# Patient Record
Sex: MALE | Race: WHITE | NOT HISPANIC OR LATINO | Employment: OTHER | URBAN - METROPOLITAN AREA
[De-identification: names, ages, dates, MRNs, and addresses within clinical notes are randomized per-mention and may not be internally consistent; named-entity substitution may affect disease eponyms.]

---

## 2017-01-19 ENCOUNTER — DOCUMENTATION ONLY (OUTPATIENT)
Dept: CARDIOLOGY | Facility: CLINIC | Age: 67
End: 2017-01-19

## 2017-01-19 ENCOUNTER — TELEPHONE (OUTPATIENT)
Dept: CARDIOLOGY | Facility: CLINIC | Age: 67
End: 2017-01-19

## 2017-01-19 NOTE — PROGRESS NOTES
Spoke with Teodoro in cath lab and no orders needed for Floroscopy entered under heart cath. JENS Barros RN

## 2017-01-20 ENCOUNTER — HOSPITAL ENCOUNTER (OUTPATIENT)
Dept: CARDIOLOGY | Facility: CLINIC | Age: 67
End: 2017-01-20
Attending: INTERNAL MEDICINE
Payer: COMMERCIAL

## 2017-01-20 ENCOUNTER — HOSPITAL ENCOUNTER (OUTPATIENT)
Dept: CT IMAGING | Facility: CLINIC | Age: 67
Discharge: HOME | End: 2017-01-20
Attending: INTERNAL MEDICINE | Admitting: INTERNAL MEDICINE
Payer: MEDICARE

## 2017-01-20 DIAGNOSIS — I35.9 AORTIC VALVE DISORDER: ICD-10-CM

## 2017-01-20 PROCEDURE — 76000 FLUOROSCOPY <1 HR PHYS/QHP: CPT | Mod: XU

## 2017-01-20 PROCEDURE — 76000 FLUOROSCOPY <1 HR PHYS/QHP: CPT | Mod: 26 | Performed by: INTERNAL MEDICINE

## 2017-01-20 PROCEDURE — 25500064 ZZH RX 255 OP 636: Performed by: INTERNAL MEDICINE

## 2017-01-20 PROCEDURE — 25000125 ZZHC RX 250: Performed by: INTERNAL MEDICINE

## 2017-01-20 PROCEDURE — 71275 CT ANGIOGRAPHY CHEST: CPT

## 2017-01-20 RX ORDER — IOPAMIDOL 755 MG/ML
80 INJECTION, SOLUTION INTRAVASCULAR ONCE
Status: COMPLETED | OUTPATIENT
Start: 2017-01-20 | End: 2017-01-20

## 2017-01-20 RX ADMIN — IOPAMIDOL 80 ML: 755 INJECTION, SOLUTION INTRAVENOUS at 10:07

## 2017-01-20 RX ADMIN — SODIUM CHLORIDE 80 ML: 9 INJECTION, SOLUTION INTRAVENOUS at 10:08

## 2017-01-24 ENCOUNTER — OFFICE VISIT (OUTPATIENT)
Dept: CARDIOLOGY | Facility: CLINIC | Age: 67
End: 2017-01-24
Payer: COMMERCIAL

## 2017-01-24 VITALS
HEART RATE: 62 BPM | WEIGHT: 187.8 LBS | BODY MASS INDEX: 26.29 KG/M2 | HEIGHT: 71 IN | DIASTOLIC BLOOD PRESSURE: 76 MMHG | SYSTOLIC BLOOD PRESSURE: 156 MMHG

## 2017-01-24 DIAGNOSIS — I10 BENIGN ESSENTIAL HYPERTENSION: Primary | ICD-10-CM

## 2017-01-24 DIAGNOSIS — R03.0 ELEVATED BLOOD PRESSURE READING WITHOUT DIAGNOSIS OF HYPERTENSION: ICD-10-CM

## 2017-01-24 DIAGNOSIS — I71.20 THORACIC AORTIC ANEURYSM WITHOUT RUPTURE (H): ICD-10-CM

## 2017-01-24 DIAGNOSIS — I35.9 AORTIC VALVE DISORDER: ICD-10-CM

## 2017-01-24 PROCEDURE — 99214 OFFICE O/P EST MOD 30 MIN: CPT | Performed by: INTERNAL MEDICINE

## 2017-01-24 RX ORDER — BENAZEPRIL HYDROCHLORIDE 10 MG/1
10 TABLET ORAL DAILY
Qty: 90 TABLET | Refills: 3 | Status: SHIPPED | OUTPATIENT
Start: 2017-01-24 | End: 2018-08-20

## 2017-01-24 RX ORDER — AMLODIPINE BESYLATE 5 MG/1
5 TABLET ORAL DAILY
Qty: 90 TABLET | Refills: 3 | Status: SHIPPED | OUTPATIENT
Start: 2017-01-24 | End: 2018-03-21

## 2017-01-24 NOTE — PROGRESS NOTES
HPI and Plan:   See dictation    Orders Placed This Encounter   Procedures     Follow-Up with Cardiac Advanced Practice Provider     24 Hour Blood Pressure Monitor - Adult     Orders Placed This Encounter   Medications     benazepril (LOTENSIN) 10 MG tablet     Sig: Take 1 tablet (10 mg) by mouth daily     Dispense:  90 tablet     Refill:  3     amLODIPine (NORVASC) 5 MG tablet     Sig: Take 1 tablet (5 mg) by mouth daily Take 5mg each afternoon     Dispense:  90 tablet     Refill:  3     Medications Discontinued During This Encounter   Medication Reason     benazepril (LOTENSIN) 20 MG tablet Reorder     amLODIPine (NORVASC) 10 MG tablet Reorder         Encounter Diagnoses   Name Primary?     Aortic valve disorder      Benign essential hypertension Yes     Thoracic aortic aneurysm without rupture (H)      Elevated blood pressure reading without diagnosis of hypertension         CURRENT MEDICATIONS:  Current Outpatient Prescriptions   Medication Sig Dispense Refill     benazepril (LOTENSIN) 10 MG tablet Take 1 tablet (10 mg) by mouth daily 90 tablet 3     amLODIPine (NORVASC) 5 MG tablet Take 1 tablet (5 mg) by mouth daily Take 5mg each afternoon 90 tablet 3     atorvastatin (LIPITOR) 80 MG tablet Take 80 mg by mouth daily       tamsulosin (FLOMAX) 0.4 MG 24 hr capsule Take 1 capsule (0.4 mg) by mouth At Bedtime 60 capsule 0     co-enzyme Q-10 100 MG CAPS Take 1 capsule by mouth daily. 90 capsule prn     cholecalciferol (VITAMIN D) 1000 UNIT tablet Take 2 tablets by mouth daily. 100 tablet prn     Warfarin Sodium (COUMADIN PO) Take 7 mg by mouth every other day        OMEPRAZOLE PO Take 40 mg by mouth 2 times daily.         atenolol (TENORMIN) 100 MG tablet Take 100 mg by mouth daily.       [DISCONTINUED] benazepril (LOTENSIN) 20 MG tablet Take 0.5 tablets (10 mg) by mouth daily       [DISCONTINUED] amLODIPine (NORVASC) 10 MG tablet Take 0.5 tablets (5 mg) by mouth daily Take 5mg each afternoon 90 tablet 3        ALLERGIES     Allergies   Allergen Reactions     Morphine Sulfate        PAST MEDICAL HISTORY:  Past Medical History   Diagnosis Date     Prostate cancer (H)      XR seeds and external XRT     Hyperlipidemia      Embolic stroke (H)      L eye -when INR low, another CVA when given Vit K before prostate surgery     Hypertension      Gastrointestinal bleeding, upper      EGD - GASTRIC ULCER, EROSIVE ESOPHAGITIS, NON BLEEDING ESOPHAGEAL ULCERS, NON BLEEDING GASTRIC ULCER, LILLIE REYNA TEAR     Gastro-oesophageal reflux disease      Aortic valve disease      ascending aortic aneurysm , AVR 1995     Squamous cell carcinoma (H)      skin Moh's R face     Intervertebral disk disease      cervicle and lumbar     Seizure (H)      2012 unclear if actual surgery     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      Cystic medial necrosis (H)      fibrillin test negative for marfan's     First degree heart block      and RBBB     Laceration      L wrist with complete severing of radial artery     Sinusitis 2012     Non-compliance      missed office visit, tests     Colitis      radiation colitis, 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     PVC (premature ventricular contraction)      CAD (coronary artery disease)      minimal on CT cor angiogram       PAST SURGICAL HISTORY:  Past Surgical History   Procedure Laterality Date     Orthopedic surgery       LEFT ROTATOR CUFF REPAIR     Colonoscopy       Aortic valve replacement  1995     [facility] 27mm 27-[id number]-[id number] serial# [id number]: aortic valve replacement with composite graft with reimplanation of the coronary arteries into the graft     Insert radiation seeds prostate  3/15/2012     Procedure:INSERT RADIATION SEEDS PROSTATE; INSERT RADIATION SEEDS PROSTATE  - Paladium Seeds  110 seeds inserted; Surgeon:JONATHAN BAY; Location:[facility]     Mohs micrographic procedure       SCC face     Repair esophageal stricture       ?clip in esophagus       FAMILY HISTORY:  Family History   Problem Relation Age of Onset  "    Skin Cancer No family hx of      Heart Murmur Father 50     mitral valve disease-Rheumatic valve disease     Alzheimer Disease Mother 95     Bipolar Disorder Sister      Prostate Cancer Brother        SOCIAL HISTORY:  Social History     Social History     Marital Status:      Spouse Name: N/A     Number of Children: N/A     Years of Education: N/A     Occupational History     retired      prior medical device exec     Social History Main Topics     Smoking status: Never Smoker      Smokeless tobacco: Never Used     Alcohol Use: Yes      Comment: 1 beer nightly & a few on the weekends      Drug Use: No     Sexual Activity: Not on file     Other Topics Concern     Special Diet No     Exercise No     Social History Narrative       Review of Systems:  Skin:  Negative     Eyes:  Positive for    ENT:  Negative    Respiratory:  Negative    Cardiovascular:  Negative    Gastroenterology: Negative    Genitourinary:  Positive for    Musculoskeletal:  Negative    Neurologic:  Positive for stroke  Psychiatric:  Negative    Heme/Lymph/Imm:  Negative    Endocrine:  Negative      Physical Exam:  Vitals: /76 mmHg  Pulse 62  Ht 1.803 m (5' 11\")  Wt 85.186 kg (187 lb 12.8 oz)  BMI 26.20 kg/m2    Constitutional:           Skin:           Head:           Eyes:           ENT:           Neck:           Chest:             Cardiac:                    Abdomen:           Vascular:                                          Extremities and Back:                 Neurological:             Recent Lab Results:  LIPID RESULTS:  Lab Results   Component Value Date    CHOL 163 10/17/2016    HDL 50 10/17/2016    LDL 76 10/17/2016    TRIG 186* 10/17/2016    CHOLHDLRATIO 2.6 08/09/2012       LIVER ENZYME RESULTS:  Lab Results   Component Value Date    AST 30 01/07/2010    ALT <5* 10/17/2016       CBC RESULTS:  Lab Results   Component Value Date    WBC 5.6 08/08/2015    RBC 3.69* 08/08/2015    HGB 9.6* 08/08/2015    HCT 31.6* 08/08/2015 "    MCV 86 08/08/2015    MCH 26.0* 08/08/2015    MCHC 30.4* 08/08/2015    RDW 18.0* 08/08/2015     08/08/2015       BMP RESULTS:  Lab Results   Component Value Date     10/17/2016    POTASSIUM 4.2 10/17/2016    CHLORIDE 102 10/17/2016    CO2 31* 10/17/2016    ANIONGAP 8.2 10/17/2016    * 10/17/2016    BUN 25 10/17/2016    BUN 12.8 10/15/2013    CR 1.14 10/17/2016    GFRESTIMATED 64 10/17/2016    GFRESTBLACK 77 10/17/2016    SACHA 9.1 10/17/2016        A1C RESULTS:  No results found for: A1C    INR RESULTS:  Lab Results   Component Value Date    INR 2.31* 08/08/2015    INR 2.59* 06/23/2012           CC  [facility]  [address] [place] [address]

## 2017-01-24 NOTE — Clinical Note
2017      Regan Maurice MD    [facility]    [address]    [place]  [address]       RE: Francisco Williamson   MRN: [id number]   : 1950      Dear Kingsley:      I had the pleasure of following up on our mutual patient, Francisco Williamson.  As you recall, he had an aortic valve composite graft replacement 2 decades ago.  His gradient across the aortic valve looked like it was increased on the last echo.  I did valve fluoroscopy.  The aortic valve on fluoroscopy looks like it is opening and closing at the appropriate angle (St. Yordan metal valve).  The ascending aorta is normal, but we did a CAT scan, which indeed shows a descending thoracic aortic aneurysm up to 45 mm.  The patient's blood pressure at home is in the 120s; however, I am consistently getting between 150 and 180 here in the office.  No doubt it is possible that he is just nervous or this is white-coat hypertension, and he mentioned that he had coffee, but to be honest, I do not think we can ignore a question of high blood pressure in a patient who has an aortic aneurysm, and we need to settle for certain if his blood pressure is actually controlled throughout the day or not.  I am going to try to order a 24-hour ambulatory blood pressure machine, but with the new Medicare, I am not sure how it is going to be covered.  We are going to try to work out this issue and have him come back after some method of checking his blood pressure long-term to determine if he needs higher doses of antihypertensives.  I will keep you in the loop.  Again, this is this new Medicare problem which is frankly interrupting appropriate medical care for this kind gentleman.  We will need to repeat his echo next year to again look at the valve to make sure the gradient does not continue to go up.  Sometimes pannus can involve the aortic root without interrupting the valve leaflets, and it is possible that is why the valve looks  fine and yet the gradient seems to be going up.      Sincerely,            Jeff Pedro MD

## 2017-01-25 NOTE — PROGRESS NOTES
2017      Regan Maurice MD    [facility]    [address], [address]    [place]  [address]       RE: Francisco Williamson   MRN: [id number]   : 1950      Dear Kingsley:      I had the pleasure of following up on our mutual patient, Francisco Williamson.  As you recall, he had an aortic valve composite graft replacement 2 decades ago.  His gradient across the aortic valve looked like it was increased on the last echo.  I did valve fluoroscopy.  The aortic valve on fluoroscopy looks like it is opening and closing at the appropriate angle (St. Yordan metal valve).  The ascending aorta is normal, but we did a CAT scan, which indeed shows a descending thoracic aortic aneurysm up to 45 mm.  The patient's blood pressure at home is in the 120s; however, I am consistently getting between 150 and 180 here in the office.  No doubt it is possible that he is just nervous or this is white-coat hypertension, and he mentioned that he had coffee, but to be honest, I do not think we can ignore a question of high blood pressure in a patient who has an aortic aneurysm, and we need to settle for certain if his blood pressure is actually controlled throughout the day or not.  I am going to try to order a 24-hour ambulatory blood pressure machine, but with the new Medicare, I am not sure how it is going to be covered.  We are going to try to work out this issue and have him come back after some method of checking his blood pressure long-term to determine if he needs higher doses of antihypertensives.  I will keep you in the loop.  Again, this is this new Medicare problem which is frankly interrupting appropriate medical care for this kind gentleman.  We will need to repeat his echo next year to again look at the valve to make sure the gradient does not continue to go up.  Sometimes pannus can involve the aortic root without interrupting the valve leaflets, and it is possible that is why the valve looks  fine and yet the gradient seems to be going up.      Sincerely,            MD CRISSY Long MD             D: 2017 15:37   T: 2017 23:43   MT: LISA      Name:     DEN HARKINS   MRN:      [id number]        Account:      [id number]   :      1950           Service Date: 2017      Document: [id number]

## 2017-01-26 ENCOUNTER — HOSPITAL ENCOUNTER (OUTPATIENT)
Dept: CARDIOLOGY | Facility: CLINIC | Age: 67
Discharge: HOME | End: 2017-01-26
Attending: INTERNAL MEDICINE | Admitting: INTERNAL MEDICINE
Payer: MEDICARE

## 2017-01-26 DIAGNOSIS — I71.20 THORACIC AORTIC ANEURYSM WITHOUT RUPTURE (H): ICD-10-CM

## 2017-01-26 DIAGNOSIS — R03.0 ELEVATED BLOOD PRESSURE READING WITHOUT DIAGNOSIS OF HYPERTENSION: ICD-10-CM

## 2017-01-26 DIAGNOSIS — I10 BENIGN ESSENTIAL HYPERTENSION: ICD-10-CM

## 2017-01-26 PROCEDURE — 93788 AMBL BP MNTR W/SW A/R: CPT

## 2017-01-26 PROCEDURE — 93790 AMBL BP MNTR W/SW I&R: CPT | Performed by: INTERNAL MEDICINE

## 2017-02-01 ENCOUNTER — OFFICE VISIT (OUTPATIENT)
Dept: CARDIOLOGY | Facility: CLINIC | Age: 67
End: 2017-02-01
Attending: INTERNAL MEDICINE
Payer: COMMERCIAL

## 2017-02-01 VITALS
WEIGHT: 187.3 LBS | BODY MASS INDEX: 26.22 KG/M2 | HEART RATE: 54 BPM | DIASTOLIC BLOOD PRESSURE: 76 MMHG | SYSTOLIC BLOOD PRESSURE: 144 MMHG | HEIGHT: 71 IN

## 2017-02-01 DIAGNOSIS — I10 BENIGN ESSENTIAL HYPERTENSION: ICD-10-CM

## 2017-02-01 DIAGNOSIS — I71.20 THORACIC AORTIC ANEURYSM WITHOUT RUPTURE (H): ICD-10-CM

## 2017-02-01 PROCEDURE — 99214 OFFICE O/P EST MOD 30 MIN: CPT | Performed by: NURSE PRACTITIONER

## 2017-02-01 NOTE — PROGRESS NOTES
HISTORY OF PRESENT ILLNESS:  Francisco Williamson is a delightful gentleman Dr. Pedro and I have known for around 21 years.  He underwent a metal aortic valve replacement with composite graft 2 decades ago.  His aortic valve gradient was increased on his last echo and a fluoroscopy was done showing that his St Yordan metal valve leaflets are opening and closing appropriately.  His ascending aorta is normal.  However, a CT scan was ordered to check his descending aorta and it is aneurysmal to 45 mm.      His blood pressure in the office is always high, but at home he states it is normal.  Therefore, a 24-hour blood pressure monitor was done and shows good control.  He has actually been struggling to prostate cancer which was treated with seeds as well as external radiation.  He is now on Flomax and had to reduce his Lotrel back. In spite of this, his blood pressure is controlled as the blood pressure 24-hour monitor was done on the lower dose.  He has good control of his lipids on Lipitor.  Triglycerides are slightly high, but his fasting sugar was also slightly high.  He does follow with his primary care doctor, Dr. Maurice, for these issues.  He has no other complaints today.      IMPRESSION AND PLAN:   1.  Status post aortic valve replacement with composite graft.  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 Continue lifelong SBE prophylaxis.  We will do an echo next year to check the gradients through his valve for any potential development of pannus.   2.  Descending aorta measuring 45 mm.  I will check with Dr. Pedro to see when he once the next image with the patient.   3.  Hypertension, very well controlled confirmed by a 24-hour ambulatory blood pressure monitoring.  We will see him back at the end of the year, repeat an echocardiogram.  I will talk with Dr. Pedro about the next timing on imaging studies for the  aorta and get back to the patient.      It has been a great pleasure as always seeing Den in followup today.         RICHY EDEN NP             D: 2017 10:45   T: 2017 13:52   MT: DONNY      Name:     DEN HARKINS   MRN:      [id number]        Account:      [id number]   :      1950           Service Date: 2017      Document: [id number]

## 2017-02-01 NOTE — PROGRESS NOTES
HPI and Plan:   See dictation  [id number]    Orders Placed This Encounter   Procedures     Basic metabolic panel     Lipid Profile     Follow-Up with Cardiologist     Echocardiogram       No orders of the defined types were placed in this encounter.       There are no discontinued medications.      Encounter Diagnoses   Name Primary?     Benign essential hypertension      Thoracic aortic aneurysm without rupture (H)        CURRENT MEDICATIONS:  Current Outpatient Prescriptions   Medication Sig Dispense Refill     benazepril (LOTENSIN) 10 MG tablet Take 1 tablet (10 mg) by mouth daily 90 tablet 3     amLODIPine (NORVASC) 5 MG tablet Take 1 tablet (5 mg) by mouth daily Take 5mg each afternoon 90 tablet 3     atorvastatin (LIPITOR) 80 MG tablet Take 80 mg by mouth daily       tamsulosin (FLOMAX) 0.4 MG 24 hr capsule Take 1 capsule (0.4 mg) by mouth At Bedtime 60 capsule 0     co-enzyme Q-10 100 MG CAPS Take 1 capsule by mouth daily. 90 capsule prn     cholecalciferol (VITAMIN D) 1000 UNIT tablet Take 2 tablets by mouth daily. 100 tablet prn     Warfarin Sodium (COUMADIN PO) Take 7 mg by mouth every other day        OMEPRAZOLE PO Take 40 mg by mouth 2 times daily.         atenolol (TENORMIN) 100 MG tablet Take 100 mg by mouth daily.         ALLERGIES     Allergies   Allergen Reactions     Morphine Sulfate        PAST MEDICAL HISTORY:  Past Medical History   Diagnosis Date     Prostate cancer (H)      XR seeds and external XRT     Hyperlipidemia      Embolic stroke (H)      L eye -when INR low, another CVA when given Vit K before prostate surgery     Hypertension      Gastrointestinal bleeding, upper      EGD - GASTRIC ULCER, EROSIVE ESOPHAGITIS, NON BLEEDING ESOPHAGEAL ULCERS, NON BLEEDING GASTRIC ULCER, LILLIE REYNA TEAR     Gastro-oesophageal reflux disease      Aortic valve disease      ascending aortic aneurysm , AVR 1995     Squamous cell carcinoma (H)      skin Moh's R face     Intervertebral disk disease       cervicle and lumbar     Seizure (H)      2012 unclear if actual surgery     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      Cystic medial necrosis (H)      fibrillin test negative for marfan's     First degree heart block      and RBBB     Laceration      L wrist with complete severing of radial artery     Sinusitis 2012     Non-compliance      missed office visit, tests     Colitis      radiation colitis, 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     PVC (premature ventricular contraction)      CAD (coronary artery disease)      minimal on CT cor angiogram       PAST SURGICAL HISTORY:  Past Surgical History   Procedure Laterality Date     Orthopedic surgery       LEFT ROTATOR CUFF REPAIR     Colonoscopy       Aortic valve replacement  1995     [facility] 27mm 27-[id number]-[id number] serial# [id number]: aortic valve replacement with composite graft with reimplanation of the coronary arteries into the graft     Insert radiation seeds prostate  3/15/2012     Procedure:INSERT RADIATION SEEDS PROSTATE; INSERT RADIATION SEEDS PROSTATE  - Paladium Seeds  110 seeds inserted; Surgeon:JONATHAN BAY; Location:[facility]     Mohs micrographic procedure       SCC face     Repair esophageal stricture       ?clip in esophagus       FAMILY HISTORY:  Family History   Problem Relation Age of Onset     Skin Cancer No family hx of      Heart Murmur Father 50     mitral valve disease-Rheumatic valve disease     Alzheimer Disease Mother 95     Bipolar Disorder Sister      Prostate Cancer Brother        SOCIAL HISTORY:  Social History     Social History     Marital Status:      Spouse Name: N/A     Number of Children: N/A     Years of Education: N/A     Occupational History     retired      prior medical device exec     Social History Main Topics     Smoking status: Never Smoker      Smokeless tobacco: Never Used     Alcohol Use: Yes      Comment: 1 beer nightly & a few on the weekends      Drug Use: No     Sexual Activity: Not Asked     Other Topics Concern     Special Diet No  "    Exercise No     Social History Narrative       Review of Systems:  Skin:  Negative       Eyes:  Negative      ENT:  Negative      Respiratory:  Negative       Cardiovascular:  Negative      Gastroenterology: Negative      Genitourinary:  not assessed      Musculoskeletal:  Positive for arthritis    Neurologic:  Negative      Psychiatric:  Negative      Heme/Lymph/Imm:  Negative      Endocrine:  Negative        Physical Exam:  Vitals: /76 mmHg  Pulse 54  Ht 1.803 m (5' 11\")  Wt 84.959 kg (187 lb 4.8 oz)  BMI 26.13 kg/m2    Constitutional:           Skin:           Head:           Eyes:           ENT:           Neck:           Chest:             Cardiac:                    Abdomen:           Vascular:                                          Extremities and Back:                 Neurological:                 CC  Jeff Pedro MD   [facility]  [address]  [place] [address]                "

## 2017-02-01 NOTE — Clinical Note
2/1/2017    Regan Maurice MD  [facility]   [address]  [place] [address]    RE: Francisco Williamson       Dear Colleague,    I had the pleasure of seeing Francisco Williamson in the [facility].    Francisco Williamson is a delightful gentleman Dr. Pedro and I have known for around 21 years.  He underwent a metal aortic valve replacement with composite graft 2 decades ago.  His aortic valve gradient was increased on his last echo and a fluoroscopy was done showing that his St Yordan metal valve leaflets are opening and closing appropriately.  His ascending aorta is normal.  However, a CT scan was ordered to check his descending aorta and it is aneurysmal to 45 mm.      His blood pressure in the office is always high, but at home he states it is normal.  Therefore, a 24-hour blood pressure monitor was done and shows good control.  He has actually been struggling to prostate cancer which was treated with seeds as well as external radiation.  He is now on Flomax and had to reduce his Lotrel back. In spite of this, his blood pressure is controlled as the blood pressure 24-hour monitor was done on the lower dose.  He has good control of his lipids on Lipitor.  Triglycerides are slightly high, but his fasting sugar was also slightly high.  He does follow with his primary care doctor, Dr. Maurice, for these issues.  He has no other complaints today.     Outpatient Encounter Prescriptions as of 2/1/2017   Medication Sig Dispense Refill     benazepril (LOTENSIN) 10 MG tablet Take 1 tablet (10 mg) by mouth daily 90 tablet 3     amLODIPine (NORVASC) 5 MG tablet Take 1 tablet (5 mg) by mouth daily Take 5mg each afternoon 90 tablet 3     atorvastatin (LIPITOR) 80 MG tablet Take 80 mg by mouth daily       tamsulosin (FLOMAX) 0.4 MG 24 hr capsule Take 1 capsule (0.4 mg) by mouth At Bedtime 60 capsule 0     co-enzyme Q-10 100 MG CAPS Take 1 capsule by mouth daily. 90 capsule prn      cholecalciferol (VITAMIN D) 1000 UNIT tablet Take 2 tablets by mouth daily. 100 tablet prn     Warfarin Sodium (COUMADIN PO) Take 7 mg by mouth every other day        OMEPRAZOLE PO Take 40 mg by mouth 2 times daily.         atenolol (TENORMIN) 100 MG tablet Take 100 mg by mouth daily.       No facility-administered encounter medications on file as of 2/1/2017.      IMPRESSION AND PLAN:   1.  Status post aortic valve replacement with composite graft.  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 Continue lifelong SBE prophylaxis.  We will do an echo next year to check the gradients through his valve for any potential development of pannus.   2.  Descending aorta measuring 45 mm.  I will check with Dr. Pedro to see when he once the next image with the patient.   3.  Hypertension, very well controlled confirmed by a 24-hour ambulatory blood pressure monitoring.  We will see him back at the end of the year, repeat an echocardiogram.  I will talk with Dr. Pdero about the next timing on imaging studies for the aorta and get back to the patient.      It has been a great pleasure as always seeing Francisco in followup today.     Sincerely,    SOFYA Herrmann [facility]

## 2017-02-08 ENCOUNTER — DOCUMENTATION ONLY (OUTPATIENT)
Dept: CARDIOLOGY | Facility: CLINIC | Age: 67
End: 2017-02-08

## 2017-02-08 DIAGNOSIS — I71.20 THORACIC AORTIC ANEURYSM WITHOUT RUPTURE (H): Primary | ICD-10-CM

## 2017-02-08 NOTE — PROGRESS NOTES
Please call patient and let him know I talked to Mayela about his descending aorta being enlarged and when to do CT    He would like 2 years so order has been placed for 1/2019    thanks

## 2017-06-08 ENCOUNTER — TRANSFERRED RECORDS (OUTPATIENT)
Dept: CARDIOLOGY | Facility: CLINIC | Age: 67
End: 2017-06-08

## 2017-06-08 LAB
FERRITIN SERPL-MCNC: 49 NG/ML
IRON SATURATION: 9
IRON: 35 UG/DL
TIBC - QUEST: 404

## 2017-06-09 LAB
ALBUMIN SERPL-MCNC: 3.9 G/DL (ref 3.6–4.8)
ALP SERPL-CCNC: 57 U/L (ref 39–117)
ALT SERPL-CCNC: 13 U/L (ref 0–44)
ANION GAP SERPL CALCULATED.3IONS-SCNC: ABNORMAL MMOL/L
AST SERPL-CCNC: 17 U/L (ref 0–40)
BILIRUB SERPL-MCNC: 0.3 MG/DL (ref 0–1.2)
BUN SERPL-MCNC: 19 MG/DL (ref 8–27)
CALCIUM SERPL-MCNC: 8.6 MG/DL (ref 8.6–10.2)
CHLORIDE SERPLBLD-SCNC: 102 MMOL/L (ref 96–106)
CHOLEST SERPL-MCNC: 173 MG/DL (ref 100–199)
CO2 SERPL-SCNC: 24 MMOL/L (ref 18–28)
CREAT SERPL-MCNC: 1.06 MG/DL (ref 0.76–1.27)
GFR SERPL CREATININE-BSD FRML MDRD: 72 ML/MIN/1.73M2
GLUCOSE SERPL-MCNC: 64 MG/DL (ref 65–99)
HDLC SERPL-MCNC: 64 MG/DL
LDLC SERPL CALC-MCNC: 90 MG/DL (ref 0–99)
NONHDLC SERPL-MCNC: NORMAL MG/DL
POTASSIUM SERPL-SCNC: 4.7 MMOL/L (ref 3.5–5.2)
PROT SERPL-MCNC: 6.5 G/DL (ref 6–8.5)
SODIUM SERPL-SCNC: 142 MMOL/L (ref 134–144)
TRIGL SERPL-MCNC: 93 MG/DL (ref 0–149)

## 2017-07-28 DIAGNOSIS — I63.40 CEREBRAL EMBOLISM WITH CEREBRAL INFARCTION (H): Primary | ICD-10-CM

## 2017-11-09 ENCOUNTER — TELEPHONE (OUTPATIENT)
Dept: CARDIOLOGY | Facility: CLINIC | Age: 67
End: 2017-11-09

## 2017-11-09 NOTE — TELEPHONE ENCOUNTER
Pt states he did not think he needed to be seen for 2 yrs from DR Pedro's last visit. Per Pt DR Pedro had said 2 years but Mini had set up Pt to be seen with echo at first of year again. Informed Pt would discuss with Dr Pedro when he is back in office.  JENS Barros RN

## 2017-11-16 ENCOUNTER — TRANSFERRED RECORDS (OUTPATIENT)
Dept: HEALTH INFORMATION MANAGEMENT | Facility: CLINIC | Age: 67
End: 2017-11-16

## 2017-11-16 LAB
ALBUMIN SERPL-MCNC: 4 G/DL
ALP SERPL-CCNC: 67 U/L
ALT SERPL-CCNC: 16 U/L
ANION GAP SERPL CALCULATED.3IONS-SCNC: NORMAL MMOL/L
AST SERPL-CCNC: 22 U/L
BILIRUB SERPL-MCNC: 0.5 MG/DL
BUN SERPL-MCNC: 17 MG/DL
CALCIUM SERPL-MCNC: 8.8 MG/DL
CHLORIDE SERPLBLD-SCNC: 98 MMOL/L
CO2 SERPL-SCNC: 26 MMOL/L
CREAT SERPL-MCNC: 1.33 MG/DL
ERYTHROCYTE [DISTWIDTH] IN BLOOD BY AUTOMATED COUNT: 15.8 %
GFR SERPL CREATININE-BSD FRML MDRD: 54 ML/MIN/1.73M2
GLUCOSE SERPL-MCNC: 88 MG/DL (ref 70–99)
HCT VFR BLD AUTO: 42.6 %
HEMOGLOBIN: 13.6 G/DL
MCH RBC QN AUTO: 30.6 PG
MCHC RBC AUTO-ENTMCNC: 31.8 G/DL
MCV RBC AUTO: 96.3 FL
PLATELET # BLD AUTO: 154 10^9/L
POTASSIUM SERPL-SCNC: 5.1 MMOL/L
PROT SERPL-MCNC: 6.9 G/DL
RBC # BLD AUTO: 4.43 10^12/L
SODIUM SERPL-SCNC: 137 MMOL/L
WBC # BLD AUTO: 4.8 10^9/L

## 2017-11-20 NOTE — TELEPHONE ENCOUNTER
CAlled Pt and left message informing him per DR Pedro note that he did want to see Pt and have echo done to check increasing gradient. JENS Barros RN

## 2017-11-21 DIAGNOSIS — Z95.2 S/P AVR: Primary | ICD-10-CM

## 2018-03-19 DIAGNOSIS — E78.5 DYSLIPIDEMIA: Primary | ICD-10-CM

## 2018-03-21 ENCOUNTER — OFFICE VISIT (OUTPATIENT)
Dept: CARDIOLOGY | Facility: CLINIC | Age: 68
End: 2018-03-21
Payer: COMMERCIAL

## 2018-03-21 ENCOUNTER — HOSPITAL ENCOUNTER (OUTPATIENT)
Dept: CARDIOLOGY | Facility: CLINIC | Age: 68
Discharge: HOME | End: 2018-03-21
Admitting: FAMILY MEDICINE
Payer: MEDICARE

## 2018-03-21 VITALS
DIASTOLIC BLOOD PRESSURE: 72 MMHG | BODY MASS INDEX: 27.02 KG/M2 | HEIGHT: 71 IN | WEIGHT: 193 LBS | SYSTOLIC BLOOD PRESSURE: 129 MMHG | HEART RATE: 48 BPM

## 2018-03-21 DIAGNOSIS — I34.0 NON-RHEUMATIC MITRAL REGURGITATION: Primary | ICD-10-CM

## 2018-03-21 DIAGNOSIS — I10 BENIGN ESSENTIAL HYPERTENSION: ICD-10-CM

## 2018-03-21 DIAGNOSIS — I71.9 DESCENDING AORTIC ANEURYSM (H): ICD-10-CM

## 2018-03-21 DIAGNOSIS — E78.5 DYSLIPIDEMIA: ICD-10-CM

## 2018-03-21 DIAGNOSIS — Z95.2 S/P AVR: ICD-10-CM

## 2018-03-21 LAB
ALT SERPL W P-5'-P-CCNC: <5 U/L (ref 5–30)
ANION GAP SERPL CALCULATED.3IONS-SCNC: 9.9 MMOL/L (ref 6–17)
BUN SERPL-MCNC: 17 MG/DL (ref 7–30)
CALCIUM SERPL-MCNC: 9.3 MG/DL (ref 8.5–10.5)
CHLORIDE SERPL-SCNC: 101 MMOL/L (ref 98–107)
CHOLEST SERPL-MCNC: 179 MG/DL
CO2 SERPL-SCNC: 29 MMOL/L (ref 23–29)
CREAT SERPL-MCNC: 1.15 MG/DL (ref 0.7–1.3)
GFR SERPL CREATININE-BSD FRML MDRD: 63 ML/MIN/1.7M2
GLUCOSE SERPL-MCNC: 87 MG/DL (ref 70–105)
HDLC SERPL-MCNC: 63 MG/DL
LDLC SERPL CALC-MCNC: 92 MG/DL
NONHDLC SERPL-MCNC: 116 MG/DL
POTASSIUM SERPL-SCNC: 4.9 MMOL/L (ref 3.5–5.1)
SODIUM SERPL-SCNC: 135 MMOL/L (ref 136–145)
TRIGL SERPL-MCNC: 122 MG/DL

## 2018-03-21 PROCEDURE — 80061 LIPID PANEL: CPT | Performed by: NURSE PRACTITIONER

## 2018-03-21 PROCEDURE — 84460 ALANINE AMINO (ALT) (SGPT): CPT | Performed by: NURSE PRACTITIONER

## 2018-03-21 PROCEDURE — 93306 TTE W/DOPPLER COMPLETE: CPT | Mod: 26 | Performed by: INTERNAL MEDICINE

## 2018-03-21 PROCEDURE — 93306 TTE W/DOPPLER COMPLETE: CPT

## 2018-03-21 PROCEDURE — 99214 OFFICE O/P EST MOD 30 MIN: CPT | Mod: 25 | Performed by: INTERNAL MEDICINE

## 2018-03-21 PROCEDURE — 36415 COLL VENOUS BLD VENIPUNCTURE: CPT | Performed by: NURSE PRACTITIONER

## 2018-03-21 PROCEDURE — 80048 BASIC METABOLIC PNL TOTAL CA: CPT | Performed by: NURSE PRACTITIONER

## 2018-03-21 RX ORDER — AMLODIPINE BESYLATE 10 MG/1
10 TABLET ORAL DAILY
COMMUNITY

## 2018-03-21 NOTE — PROGRESS NOTES
HPI and Plan:   See dictation    Orders Placed This Encounter   Procedures     CT Chest Angio w/o & w Contrast     Follow-Up with Cardiologist     Echocardiogram     Orders Placed This Encounter   Medications     amLODIPine (NORVASC) 10 MG tablet     Sig: Take 10 mg by mouth daily     TAMSULOSIN HCL PO     Medications Discontinued During This Encounter   Medication Reason     amLODIPine (NORVASC) 5 MG tablet Discontinued by another Health Care Provider     tamsulosin (FLOMAX) 0.4 MG 24 hr capsule Discontinued by another Health Care Provider         Encounter Diagnoses   Name Primary?     S/P AVR      Non-rheumatic mitral regurgitation Yes     Descending aortic aneurysm (H)        CURRENT MEDICATIONS:  Current Outpatient Prescriptions   Medication Sig Dispense Refill     amLODIPine (NORVASC) 10 MG tablet Take 10 mg by mouth daily       TAMSULOSIN HCL PO        benazepril (LOTENSIN) 10 MG tablet Take 1 tablet (10 mg) by mouth daily 90 tablet 3     atorvastatin (LIPITOR) 80 MG tablet Take 80 mg by mouth daily       co-enzyme Q-10 100 MG CAPS Take 1 capsule by mouth daily. 90 capsule prn     cholecalciferol (VITAMIN D) 1000 UNIT tablet Take 2 tablets by mouth daily. 100 tablet prn     Warfarin Sodium (COUMADIN PO) Take 7 mg by mouth every other day        OMEPRAZOLE PO Take 40 mg by mouth 2 times daily.         atenolol (TENORMIN) 100 MG tablet Take 100 mg by mouth daily.       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     Cystic medial necrosis (H)     fibrillin test negative for marfan's     Embolic stroke (H)     L  eye -when INR low, another CVA when given Vit K before prostate surgery     First degree heart block     and RBBB     Gastro-oesophageal reflux disease      Gastrointestinal bleeding, upper     EGD - GASTRIC ULCER, EROSIVE ESOPHAGITIS, NON BLEEDING ESOPHAGEAL ULCERS, NON BLEEDING GASTRIC ULCER, LILLIE REYNA TEAR     Hyperlipidemia      Hypertension      Intervertebral disk disease     cervicle and lumbar     Laceration     L wrist with complete severing of radial artery     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      Non-compliance     missed office visit, tests     Prostate cancer (H)     XR seeds and external XRT     PVC (premature ventricular contraction)      Seizure (H)     2012 unclear if actual surgery     Sinusitis 2012     Squamous cell carcinoma     skin Moh's R face       PAST SURGICAL HISTORY:  Past Surgical History:   Procedure Laterality Date     AORTIC VALVE REPLACEMENT  1995    SJM 27mm [id number] serial# [id number]: aortic valve replacement with composite graft with reimplanation of the coronary arteries into the graft     COLONOSCOPY       INSERT RADIATION SEEDS PROSTATE  3/15/2012    Procedure:INSERT RADIATION SEEDS PROSTATE; INSERT RADIATION SEEDS PROSTATE  - Paladium Seeds  110 seeds inserted; Surgeon:JONATHAN BAY; Location: [facility]     MOHS MICROGRAPHIC PROCEDURE      SCC face     ORTHOPEDIC SURGERY      LEFT ROTATOR CUFF REPAIR     REPAIR ESOPHAGEAL STRICTURE      ?clip in esophagus       FAMILY HISTORY:  Family History   Problem Relation Age of Onset     Heart Murmur Father 50     mitral valve disease-Rheumatic valve disease     Alzheimer Disease Mother 95     Bipolar Disorder Sister      Prostate Cancer Brother      Skin Cancer No family hx of        SOCIAL HISTORY:  Social History     Social History     Marital status:      Spouse name: N/A     Number of children: N/A     Years of education: N/A     Occupational History     retired      prior medical device exec     Social History Main  "Topics     Smoking status: Never Smoker     Smokeless tobacco: Never Used     Alcohol use Yes      Comment: 1 beer nightly & a few on the weekends      Drug use: No     Sexual activity: Not Asked     Other Topics Concern     Special Diet No     Exercise No     Social History Narrative       Review of Systems:  Skin:  Negative     Eyes:  Negative    ENT:  Negative    Respiratory:  Negative    Cardiovascular:  Negative;Negative for;lightheadedness Positive for  Gastroenterology: Negative    Genitourinary:  not assessed    Musculoskeletal:  Positive for arthritis  Neurologic:  Negative stroke  Psychiatric:  Negative    Heme/Lymph/Imm:  Negative    Endocrine:  Negative      Physical Exam:  Vitals: /72  Pulse (!) 48  Ht 1.803 m (5' 10.98\")  Wt 87.5 kg (193 lb)  BMI 26.93 kg/m2    Constitutional:           Skin:             Head:           Eyes:           Lymph:      ENT:           Neck:           Respiratory:            Cardiac:                                                           GI:           Extremities and Muscular Skeletal:                 Neurological:           Psych:         Recent Lab Results:  LIPID RESULTS:  Lab Results   Component Value Date    CHOL 179 03/21/2018    HDL 63 03/21/2018    LDL 92 03/21/2018    TRIG 122 03/21/2018    CHOLHDLRATIO 2.6 08/09/2012       LIVER ENZYME RESULTS:  Lab Results   Component Value Date    AST 22 11/16/2017    ALT <5 (L) 03/21/2018       CBC RESULTS:  Lab Results   Component Value Date    WBC 4.8 11/16/2017    RBC 4.43 11/16/2017    HGB 13.6 11/16/2017    HCT 42.6 11/16/2017    MCV 96.3 11/16/2017    MCH 30.6 11/16/2017    MCHC 31.8 11/16/2017    RDW 15.8 11/16/2017     11/16/2017       BMP RESULTS:  Lab Results   Component Value Date     (L) 03/21/2018    POTASSIUM 4.9 03/21/2018    CHLORIDE 101 03/21/2018    CO2 29 03/21/2018    ANIONGAP 9.9 03/21/2018    GLC 87 03/21/2018    BUN 17 03/21/2018    CR 1.15 03/21/2018    GFRESTIMATED 63 03/21/2018 "    GFRESTBLACK 76 03/21/2018    SACHA 9.3 03/21/2018        A1C RESULTS:  No results found for: A1C    INR RESULTS:  Lab Results   Component Value Date    INR 2.31 (H) 08/08/2015    INR 2.59 (H) 06/23/2012           CC  [facility]  [address] [facility] [address]  [place] [address]

## 2018-03-21 NOTE — MR AVS SNAPSHOT
After Visit Summary   3/21/2018    Francisco Williamson    MRN: [id number]           Patient Information     Date Of Birth          1950        Visit Information        Provider Department      3/21/2018 4:15 PM Jeff Pedro MD [facility]        Today's Diagnoses     Non-rheumatic mitral regurgitation    -  1    S/P AVR        Descending aortic aneurysm (H)           Follow-ups after your visit        Additional Services     Follow-Up with Cardiologist                 Future tests that were ordered for you today     Open Future Orders        Priority Expected Expires Ordered    CT Chest Angio w/o & w Contrast Routine 3/4/2019 3/18/2019 3/21/2018    Echocardiogram Routine 3/21/2019 3/22/2019 3/21/2018    Follow-Up with Cardiologist Routine 3/21/2019 3/22/2019 3/21/2018            Who to contact     If you have questions or need follow up information about today's clinic visit or your schedule please contact [facility] directly at [phone].  Normal or non-critical lab and imaging results will be communicated to you by NMRKThart, letter or phone within 4 business days after the clinic has received the results. If you do not hear from us within 7 days, please contact the clinic through NMRKThart or phone. If you have a critical or abnormal lab result, we will notify you by phone as soon as possible.  Submit refill requests through [employer] or call your pharmacy and they will forward the refill request to us. Please allow 3 business days for your refill to be completed.          Additional Information About Your Visit        MyChart Information     [employer] gives you secure access to your electronic health record. If you see a primary care provider, you can also send messages to your care team and make appointments. If you have questions, please call your primary care clinic.  If you do not have a primary care provider,  "please call [phone] and they will assist you.        Care EveryWhere ID     This is your Care EveryWhere ID. This could be used by other organizations to access your [facility] medical records  [id number]        Your Vitals Were     Pulse Height BMI (Body Mass Index)             48 1.803 m (5' 10.98\") 26.93 kg/m2          Blood Pressure from Last 3 Encounters:   03/21/18 129/72   02/01/17 144/76   01/24/17 156/76    Weight from Last 3 Encounters:   03/21/18 87.5 kg (193 lb)   02/01/17 85 kg (187 lb 4.8 oz)   01/24/17 85.2 kg (187 lb 12.8 oz)               Primary Care Provider Office Phone # Fax #    Regan Maurice [phone]       [facility] [address]   [place] [address]        Equal Access to Services     ARA MOSELEY : Hadii aad ku hadasho Soomaali, waaxda luqadaha, qaybta kaalmada adeegyada, waxay brettin hayquintenn pete barlow . So [facility] [phone].    ATENCIÓN: Si meena toussaint, tiene a leach disposición servicios gratuitos de asistencia lingüística. Brock al [phone].    We comply with applicable federal civil rights laws and [place] laws. We do not discriminate on the basis of race, color, national origin, age, disability, sex, sexual orientation, or gender identity.            Thank you!     Thank you for choosing [facility]  for your care. Our goal is always to provide you with excellent care. Hearing back from our patients is one way we can continue to improve our services. Please take a few minutes to complete the written survey that you may receive in the mail after your visit with us. Thank you!             Your Updated Medication List - Protect others around you: Learn how to safely use, store and throw away your medicines at www.disposemymeds.org.          This list is accurate as of 3/21/18  5:12 PM.  Always use your most recent med list.                   Brand Name Dispense Instructions for use " Diagnosis    amLODIPine 10 MG tablet    NORVASC     Take 10 mg by mouth daily        atenolol 100 MG tablet    TENORMIN     Take 100 mg by mouth daily.        atorvastatin 80 MG tablet    LIPITOR     Take 80 mg by mouth daily        benazepril 10 MG tablet    LOTENSIN    90 tablet    Take 1 tablet (10 mg) by mouth daily    Benign essential hypertension       cholecalciferol 1000 UNIT tablet    vitamin D3    100 tablet    Take 2 tablets by mouth daily.    Hyperlipidemia LDL goal <70       co-enzyme Q-10 100 MG Caps capsule     90 capsule    Take 1 capsule by mouth daily.    Hyperlipidemia LDL goal <70       COUMADIN PO      Take 7 mg by mouth every other day        OMEPRAZOLE PO      Take 40 mg by mouth 2 times daily.        TAMSULOSIN HCL PO

## 2018-03-22 NOTE — PROGRESS NOTES
Service Date: 03/21/2018      HISTORY OF PRESENT ILLNESS:  I had the pleasure of following up on our mutual patient, Francisco Williamson.  He is a delightful 67-year-old gentleman.  As you know, he had cystic medial necrosis of his aorta, although fibrillin test negative for Marfan.  He underwent an aortic valve replacement with a 27 mm St. Yordan valve and aortic conduit back in 1995.  That valve has been working well.  In 2013 the echo suggested an abrupt increase in the valve gradient, but the fluoroscopy was normal and all of the other echos since that time are fine.  Today we looked at his 2018 echo and reviewed the actual pictures and then went back through 2012.  The LV size and function remain normal.  He does have LVH.  The mean aortic valve gradient is 24.7 currently, and the other echos except for the 2013 usually shoe gradients of about 23, which is similar.  The valve area is roughly 1.3 cm squared.  What is different is there is a little bit more mitral valve insufficiency now.  It was read as 2+ mitral insufficiency.  I looked at the pictures with the patient, and I think it is on the low end of 2+, but I agree that the echos going back several years only show trace to mild mitral insufficiency.  The mitral valve has mitral annular calcification and nonspecific thickening of the valve leaflets and chordae, and that is probably mild degeneration of the valve causing regurgitation.  The patient himself has absolutely no cardiovascular complaints.  As you remember, he had prostate cancer.  He got radiation seeds.  He developed some colitis, and he tends to get colon bleeding when his INR gets above 3.0.  Since he is in sinus rhythm and it is a metal valve that is more than 2 decades old, I think we can run the INR between 2.0 and 3.0.  I told him if the bleeding becomes a catastrophic issue, we do have the option of going back to open heart surgery and replacing the valve with a tissue valve.  That is obviously  an extreme, but it is an option.  His descending aorta, unfortunately, is enlarged to 45 mm on a CAT scan a year or two ago.  I am going to get a followup CAT scan next year.  We briefly touched on EVAR, etc.  We reviewed the medicines, his blood tests.  We discussed longevity of the valve and the conduit.  Today's visit was 35 minutes, all counseling.  I will see him back next year for a regular echo to look at the mitral and aortic valves and a CAT scan to look at the descending aorta.  Again, if you could kindly do his routine blood work, I would appreciate it.      Crissy Ernst MD       cc:   Regan Maurice MD    [facility]    [address] [facility], [address] [place] [address]  [address]         CRISSY ERNST MD             D: 2018   T: 2018   MT: LISA      Name:     DEN HARKINS   MRN:      [id number]        Account:      [id number]   :      1950           Service Date: 2018      Document: [id number]

## 2018-04-23 ENCOUNTER — TELEPHONE (OUTPATIENT)
Dept: CARDIOLOGY | Facility: CLINIC | Age: 68
End: 2018-04-23

## 2018-04-23 NOTE — TELEPHONE ENCOUNTER
Pt fell and hit head on marble floor when in [place]. He suffered a subdural hematoma. He is unconscious. Went to hospital I [place] to drain hematoma. Still in a coma, and CT scan done showing still no further bleeding. Gave son phone number to medical records for that facility to call for information.  Pt is aortic valve and off coumadin with previous stroke.  JENS Barros RN

## 2018-04-24 NOTE — TELEPHONE ENCOUNTER
Received call from family. The insurance company is intending to have Pt flown back by late this week.  Pt is still comatose and has pneumonia that is being treated. They want to send Pt to [place] or [facility]. Family wants to know what Dr Owen thinks is preferable facility.  JENS Barros RN

## 2018-08-20 DIAGNOSIS — I10 BENIGN ESSENTIAL HYPERTENSION: ICD-10-CM

## 2018-08-20 RX ORDER — BENAZEPRIL HYDROCHLORIDE 10 MG/1
10 TABLET ORAL DAILY
Qty: 90 TABLET | Refills: 2 | Status: SHIPPED | OUTPATIENT
Start: 2018-08-20 | End: 2019-06-04

## 2018-08-21 ENCOUNTER — HOSPITAL ENCOUNTER (OUTPATIENT)
Dept: PHYSICAL THERAPY | Facility: CLINIC | Age: 68
Setting detail: THERAPIES SERIES
End: 2018-08-21
Attending: FAMILY MEDICINE
Payer: MEDICARE

## 2018-08-21 PROCEDURE — G8978 MOBILITY CURRENT STATUS: HCPCS | Mod: GP,CK | Performed by: PHYSICAL THERAPIST

## 2018-08-21 PROCEDURE — 40000719 ZZHC STATISTIC PT DEPARTMENT NEURO VISIT: Performed by: PHYSICAL THERAPIST

## 2018-08-21 PROCEDURE — 97112 NEUROMUSCULAR REEDUCATION: CPT | Mod: GP | Performed by: PHYSICAL THERAPIST

## 2018-08-21 PROCEDURE — 97162 PT EVAL MOD COMPLEX 30 MIN: CPT | Mod: GP | Performed by: PHYSICAL THERAPIST

## 2018-08-21 PROCEDURE — G8979 MOBILITY GOAL STATUS: HCPCS | Mod: GP,CJ | Performed by: PHYSICAL THERAPIST

## 2018-08-21 ASSESSMENT — 6 MINUTE WALK TEST (6MWT): TOTAL DISTANCE WALKED (FT): 1000

## 2018-08-21 NOTE — PROGRESS NOTES
[facility]        OUTPATIENT PHYSICAL THERAPY FUNCTIONAL EVALUATION  PLAN OF TREATMENT FOR OUTPATIENT REHABILITATION  (COMPLETE FOR INITIAL CLAIMS ONLY)  Patient's Last Name, First Name, M.I.  YOB: 1950  Francisco Williamson     Provider's Name   [facility]   Medical Record No.  [id number]     Start of Care Date:  08/21/18   Onset Date:  04/14/18   Type:     _X__PT   ____OT  ____SLP Medical Diagnosis:  TBI with loss of consciousness     PT Diagnosis:  Impaired balance and gait Visits from SOC:  1                              __________________________________________________________________________________  Plan of Treatment/Functional Goals:  balance training, gait training, neuromuscular re-education, stretching, strengthening, ROM, transfer training           GOALS  FGA  The pt will improve score on FGA to >20/30, indicating a reduction in fall risk  10/20/18    6MWT  The pt will be able to walk >1150' in 6 minutes due to improvements in activity tolerance and balance, thereby improving ability to ambulate in the community  10/20/18    mCTSIB  The pt will be able to maintain balance for 30 seconds on first 3 conditions of mCTSIB and at least 10 seconds on final condition, indicating an improvement in static balance and ability to safely complete ADLs  10/20/18    HEP  The pt will be independent with a HEP focusing on improvment balance, coordination and general mobility in order to continue to progress mobility outside of therapy  10/20/18                     Therapy Frequency:  2 times/Week   Predicted Duration of Therapy Intervention:  60 days    Griselda Loera, PT                                    I CERTIFY THE NEED FOR THESE SERVICES FURNISHED UNDER        THIS PLAN OF TREATMENT AND WHILE UNDER MY CARE .             Physician Signature                Date    X_____________________________________________________                      Certification Date From:  08/21/18   Certification Date To:  10/20/18    Referring Provider:  Regan Maurice MD    Initial Assessment  See Epic Evaluation- Start of Care Date: 08/21/18

## 2018-08-21 NOTE — PROGRESS NOTES
08/21/18 0700   Quick Adds   Quick Adds Certification   Type of Visit Initial OP PT Evaluation   General Information   Start of Care Date 08/21/18   Referring Physician Regan Maurice MD   Orders Evaluate and Treat as Indicated   Order Date 07/30/18   Medical Diagnosis TBI with loss of consiousness   Onset of illness/injury or Date of Surgery 04/14/18   Surgical/Medical history reviewed Yes   Pertinent history of current problem (include personal factors and/or comorbidities that impact the POC) The pt fell and hit his head on the marble floor in [place] on 4/14/18. Found to have a subdural hematoma on R, s/p craniectomy. Course complicated by pneumonia, intubation. Discharged to rehab unit and then home. PMH significant for CVA, anxiety. Reports that he has progressed to wakling with and without the walker. Feels that his strength is pretty good but needs to work on balance and coordination. Has not had any more falls.    Prior level of function comment Independent   Patient role/Employment history Retired   Living environment [facility]  (3 level)   Current Assistive Devices Front Wheeled Walker   Patient/Family Goals Statement IMprove balance, reduce need for AD   Fall Risk Screen   Fall screen completed by PT   Have you fallen 2 or more times in the past year? No   Have you fallen and had an injury in the past year? Yes   Timed Up and Go score (seconds) 8.82   Is patient a fall risk? Yes   Fall screen comments per FGA   Pain   Pain comments R shoulder pain   Cognitive Status Examination   Orientation orientation to person, place and time   Posture   Posture Forward head position   Range of Motion (ROM)   ROM Comment B knees limited to about 10 degrees from extension, pt reports that it has been this way for years   Strength   Strength Comments Grossly 4+/5 in B LEs   Transfer Skills   Transfer Comments Independent   Gait   Gait Comments Ambulates wtih wider DANISH, occasional mis step with LLE, slower  reji   Gait Special Tests   Gait Special Tests SIX MINUTE WALK TEST;FUNCTIONAL GAIT ASSESSMENT   Gait Special Tests Functional Gait Assessment Score out of 30   Score out of 30 16   Gait Special Tests Six Minute Walk Test   Feet 1000 Feet   Balance Special Tests   Balance Special Tests Sit to stand reps   Balance Special Tests Sit to Stand Reps in 30 Seconds   Reps in 30 seconds 10   Sensory Examination   Sensory Perception no deficits were identified   Coordination   Coordination Comments slightly impaired L heel-shin   Planned Therapy Interventions   Planned Therapy Interventions balance training;gait training;neuromuscular re-education;stretching;strengthening;ROM;transfer training   Clinical Impression   Criteria for Skilled Therapeutic Interventions Met yes, treatment indicated   PT Diagnosis Impaired balance and gait   Influenced by the following impairments pain, mild weakness, incoordination, fatigue, instability   Functional limitations due to impairments difficulty with gait in household adn community, fall risk, is not driving   Clinical Presentation Evolving/Changing   Clinical Presentation Rationale fluctuating fatigue and gait pattern   Clinical Decision Making (Complexity) Moderate complexity   Therapy Frequency 2 times/Week   Predicted Duration of Therapy Intervention (days/wks) 60 days   Risk & Benefits of therapy have been explained Yes   Patient, Family & other staff in agreement with plan of care Yes   Education Assessment   Preferred Learning Style Demonstration   Barriers to Learning No barriers   GOALS   PT Eval Goals 1;2;3;4   Goal 1   Goal Identifier FGA   Goal Description The pt will improve score on FGA to >20/30, indicating a reduction in fall risk   Target Date 10/20/18   Goal 2   Goal Identifier 6MWT   Goal Description The pt will be able to walk >1150' in 6 minutes due to improvements in activity tolerance and balance, thereby improving ability to ambulate in the community   Target  Date 10/20/18   Goal 3   Goal Identifier mCTSIB   Goal Description The pt will be able to maintain balance for 30 seconds on first 3 conditions of mCTSIB and at least 10 seconds on final condition, indicating an improvement in static balance and ability to safely complete ADLs   Target Date 10/20/18   Goal 4   Goal Identifier HEP   Goal Description The pt will be independent with a HEP focusing on improvment balance, coordination and general mobility in order to continue to progress mobility outside of therapy   Target Date 10/20/18   Total Evaluation Time   Total Evaluation Time (Minutes) 30   Therapy Certification   Certification date from 08/21/18   Certification date to 10/20/18   Medical Diagnosis TBI with loss of consciousness   Certification I certify the need for these services furnished under this plan of treatment and while under my care.  (Physician co-signature of this document indicates review and certification of the therapy plan).

## 2018-08-28 ENCOUNTER — HOSPITAL ENCOUNTER (OUTPATIENT)
Dept: PHYSICAL THERAPY | Facility: CLINIC | Age: 68
Setting detail: THERAPIES SERIES
End: 2018-08-28
Attending: FAMILY MEDICINE
Payer: MEDICARE

## 2018-08-28 PROCEDURE — 97112 NEUROMUSCULAR REEDUCATION: CPT | Mod: GP | Performed by: PHYSICAL THERAPIST

## 2018-08-28 PROCEDURE — 40000719 ZZHC STATISTIC PT DEPARTMENT NEURO VISIT: Performed by: PHYSICAL THERAPIST

## 2018-08-30 ENCOUNTER — HOSPITAL ENCOUNTER (OUTPATIENT)
Dept: PHYSICAL THERAPY | Facility: CLINIC | Age: 68
Setting detail: THERAPIES SERIES
End: 2018-08-30
Attending: FAMILY MEDICINE
Payer: MEDICARE

## 2018-08-30 PROCEDURE — 97112 NEUROMUSCULAR REEDUCATION: CPT | Mod: GP | Performed by: PHYSICAL THERAPIST

## 2018-08-30 PROCEDURE — 40000719 ZZHC STATISTIC PT DEPARTMENT NEURO VISIT: Performed by: PHYSICAL THERAPIST

## 2018-09-04 ENCOUNTER — HOSPITAL ENCOUNTER (OUTPATIENT)
Dept: PHYSICAL THERAPY | Facility: CLINIC | Age: 68
Setting detail: THERAPIES SERIES
End: 2018-09-04
Attending: FAMILY MEDICINE
Payer: MEDICARE

## 2018-09-04 PROCEDURE — 40000719 ZZHC STATISTIC PT DEPARTMENT NEURO VISIT: Performed by: PHYSICAL THERAPIST

## 2018-09-04 PROCEDURE — 97112 NEUROMUSCULAR REEDUCATION: CPT | Mod: GP | Performed by: PHYSICAL THERAPIST

## 2018-09-06 ENCOUNTER — HOSPITAL ENCOUNTER (OUTPATIENT)
Dept: PHYSICAL THERAPY | Facility: CLINIC | Age: 68
Setting detail: THERAPIES SERIES
End: 2018-09-06
Attending: FAMILY MEDICINE
Payer: MEDICARE

## 2018-09-06 PROCEDURE — 97116 GAIT TRAINING THERAPY: CPT | Mod: GP | Performed by: PHYSICAL THERAPIST

## 2018-09-06 PROCEDURE — 97112 NEUROMUSCULAR REEDUCATION: CPT | Mod: GP | Performed by: PHYSICAL THERAPIST

## 2018-09-06 PROCEDURE — 40000719 ZZHC STATISTIC PT DEPARTMENT NEURO VISIT: Performed by: PHYSICAL THERAPIST

## 2018-09-11 ENCOUNTER — HOSPITAL ENCOUNTER (OUTPATIENT)
Dept: PHYSICAL THERAPY | Facility: CLINIC | Age: 68
Setting detail: THERAPIES SERIES
End: 2018-09-11
Attending: FAMILY MEDICINE
Payer: MEDICARE

## 2018-09-11 PROCEDURE — 97112 NEUROMUSCULAR REEDUCATION: CPT | Mod: GP | Performed by: PHYSICAL THERAPIST

## 2018-09-11 PROCEDURE — 97116 GAIT TRAINING THERAPY: CPT | Mod: GP | Performed by: PHYSICAL THERAPIST

## 2018-09-11 PROCEDURE — 40000719 ZZHC STATISTIC PT DEPARTMENT NEURO VISIT: Performed by: PHYSICAL THERAPIST

## 2018-09-13 ENCOUNTER — HOSPITAL ENCOUNTER (OUTPATIENT)
Dept: PHYSICAL THERAPY | Facility: CLINIC | Age: 68
Setting detail: THERAPIES SERIES
End: 2018-09-13
Attending: FAMILY MEDICINE
Payer: MEDICARE

## 2018-09-13 PROCEDURE — 97530 THERAPEUTIC ACTIVITIES: CPT | Mod: GP | Performed by: PHYSICAL THERAPIST

## 2018-09-13 PROCEDURE — 97110 THERAPEUTIC EXERCISES: CPT | Mod: GP | Performed by: PHYSICAL THERAPIST

## 2018-09-13 PROCEDURE — G8979 MOBILITY GOAL STATUS: HCPCS | Mod: GP,CI | Performed by: PHYSICAL THERAPIST

## 2018-09-13 PROCEDURE — 40000719 ZZHC STATISTIC PT DEPARTMENT NEURO VISIT: Performed by: PHYSICAL THERAPIST

## 2018-09-13 PROCEDURE — 97112 NEUROMUSCULAR REEDUCATION: CPT | Mod: GP | Performed by: PHYSICAL THERAPIST

## 2018-09-13 PROCEDURE — G8978 MOBILITY CURRENT STATUS: HCPCS | Mod: GP,CJ | Performed by: PHYSICAL THERAPIST

## 2018-09-13 NOTE — PROGRESS NOTES
Outpatient Physical Therapy Progress Note     Patient: Francisco Williamson  : 1950    Beginning/End Dates of Reporting Period:  18 to 2018    Referring Provider: Regan Maurice MD    Therapy Diagnosis: Impaired balance and gait     Client Self Report: Doing well overall    Objective Measurements:  Objective Measure: mCTSIB  Details: 30;30;30;20  Objective Measure: FGA  Details:   Objective Measure: 6MWT  Details: 1125' without an AD,  following.       Goals:  Goal Identifier FGA   Goal Description The pt will improve score on FGA to >20/30, indicating a reduction in fall risk   Target Date 10/20/18   Date Met   Met- will progress goal to >22/30   Progress:     Goal Identifier 6MWT   Goal Description The pt will be able to walk >1150' in 6 minutes due to improvements in activity tolerance and balance, thereby improving ability to ambulate in the community   Target Date 10/20/18   Date Met   In progress- improved to 1125 feet   Progress:     Goal Identifier mCTSIB   Goal Description The pt will be able to maintain balance for 30 seconds on first 3 conditions of mCTSIB and at least 10 seconds on final condition, indicating an improvement in static balance and ability to safely complete ADLs   Target Date 10/20/18   Date Met   18   Progress:     Goal Identifier HEP   Goal Description The pt will be independent with a HEP focusing on improvment balance, coordination and general mobility in order to continue to progress mobility outside of therapy   Target Date 10/20/18   Date Met      Progress: In progress       Progress Toward Goals:   Progress this reporting period: The client has made excellent progress with functional mobility. He demonstrated significant improvement with each objective measure assessed. His FGA score improved from 16 to 21/30, his 6 minute walk test distance improved from 1000' to 1125'. He also demonstrated improvements in static balance as seen on the mCTSIB. The pt  requires less assist from walker at this time however continue to recommend use of cane or trekking poles when ambulating outside due to slight increase in fall risk. Will continue to address high level balance and gait impairments to assist with return to prior level of function.     Plan:  Changes to goals: advanced goals    Discharge:  No

## 2018-09-18 ENCOUNTER — HOSPITAL ENCOUNTER (OUTPATIENT)
Dept: PHYSICAL THERAPY | Facility: CLINIC | Age: 68
Setting detail: THERAPIES SERIES
End: 2018-09-18
Attending: FAMILY MEDICINE
Payer: MEDICARE

## 2018-09-18 PROCEDURE — 97112 NEUROMUSCULAR REEDUCATION: CPT | Mod: GP | Performed by: PHYSICAL THERAPIST

## 2018-09-18 PROCEDURE — 40000719 ZZHC STATISTIC PT DEPARTMENT NEURO VISIT: Performed by: PHYSICAL THERAPIST

## 2018-09-18 PROCEDURE — 97110 THERAPEUTIC EXERCISES: CPT | Mod: GP | Performed by: PHYSICAL THERAPIST

## 2018-09-25 ENCOUNTER — HOSPITAL ENCOUNTER (OUTPATIENT)
Dept: PHYSICAL THERAPY | Facility: CLINIC | Age: 68
Setting detail: THERAPIES SERIES
End: 2018-09-25
Attending: FAMILY MEDICINE
Payer: MEDICARE

## 2018-09-25 PROCEDURE — 97112 NEUROMUSCULAR REEDUCATION: CPT | Mod: GP | Performed by: PHYSICAL THERAPIST

## 2018-09-25 PROCEDURE — 97110 THERAPEUTIC EXERCISES: CPT | Mod: GP | Performed by: PHYSICAL THERAPIST

## 2018-09-25 PROCEDURE — 40000719 ZZHC STATISTIC PT DEPARTMENT NEURO VISIT: Performed by: PHYSICAL THERAPIST

## 2018-10-02 ENCOUNTER — HOSPITAL ENCOUNTER (OUTPATIENT)
Dept: PHYSICAL THERAPY | Facility: CLINIC | Age: 68
Setting detail: THERAPIES SERIES
End: 2018-10-02
Attending: FAMILY MEDICINE
Payer: MEDICARE

## 2018-10-02 PROCEDURE — 97110 THERAPEUTIC EXERCISES: CPT | Mod: GP | Performed by: PHYSICAL THERAPIST

## 2018-10-02 PROCEDURE — 97112 NEUROMUSCULAR REEDUCATION: CPT | Mod: GP | Performed by: PHYSICAL THERAPIST

## 2018-10-02 PROCEDURE — 40000719 ZZHC STATISTIC PT DEPARTMENT NEURO VISIT: Performed by: PHYSICAL THERAPIST

## 2018-10-16 ENCOUNTER — HOSPITAL ENCOUNTER (OUTPATIENT)
Dept: PHYSICAL THERAPY | Facility: CLINIC | Age: 68
Setting detail: THERAPIES SERIES
End: 2018-10-16
Attending: FAMILY MEDICINE
Payer: MEDICARE

## 2018-10-16 PROCEDURE — 97112 NEUROMUSCULAR REEDUCATION: CPT | Mod: GP | Performed by: PHYSICAL THERAPIST

## 2018-10-16 PROCEDURE — G8979 MOBILITY GOAL STATUS: HCPCS | Mod: GP,CI | Performed by: PHYSICAL THERAPIST

## 2018-10-16 PROCEDURE — 40000719 ZZHC STATISTIC PT DEPARTMENT NEURO VISIT: Performed by: PHYSICAL THERAPIST

## 2018-10-16 PROCEDURE — 97110 THERAPEUTIC EXERCISES: CPT | Mod: GP | Performed by: PHYSICAL THERAPIST

## 2018-10-16 PROCEDURE — G8978 MOBILITY CURRENT STATUS: HCPCS | Mod: GP,CI | Performed by: PHYSICAL THERAPIST

## 2018-10-16 NOTE — PROGRESS NOTES
[facility]      OUTPATIENT PHYSICAL THERAPY  PLAN OF TREATMENT FOR OUTPATIENT REHABILITATION    Patient's Last Name, First Name, M.I.                YOB: 1950  Francisco Williamson                        Provider's Name  [facility] Medical Record No.  [id number]                               Onset Date: 4/14/18   Start of Care Date: 8/21/18   Type:     _X_PT   ___OT   ___SLP Medical Diagnosis: TBI with loss of consciousness                       PT Diagnosis: Impaired balance and gait      _________________________________________________________________________________  Plan of Treatment:    Frequency/Duration: 1 visit every 2 weeks for 60 days     Goals:  Goal Identifier FGA   Goal Description The pt will improve score on FGA to >20/30, indicating a reduction in fall risk   Target Date 10/20/18   Date Met   10/16/18   Progress:     Goal Identifier 6MWT   Goal Description The pt will be able to walk >1150' in 6 minutes due to improvements in activity tolerance and balance, thereby improving ability to ambulate in the community   Target Date 10/20/18   Date Met   10/16/18   Progress:     Goal Identifier mCTSIB   Goal Description The pt will be able to maintain balance for 30 seconds on first 3 conditions of mCTSIB and at least 10 seconds on final condition, indicating an improvement in static balance and ability to safely complete ADLs   Target Date 10/20/18   Date Met   10/16/18   Progress:     Goal Identifier HEP   Goal Description The pt will be independent with a HEP focusing on improvment balance, coordination and general mobility in order to continue to progress mobility outside of therapy   Target Date 10/20/18   Date Met   10/16/18   Progress:     Goal Identifier  NEW GOAL- FGA   Goal Description  The patient will score > 25/30 on FGA due to improvements in balance, and  assisting with return to PLOF   Target Date 12/20/18   Date Met      Progress:     Goal Identifier  NEW GOAL- Activity tolerance   Goal Description  The patient will be able to walk for 20 minutes without a rest break in order to improve ability to ambulate in the community   Target Date  12/20/18   Date Met      Progress:       Progress Toward Goals:   Progress this reporting period: The pt has demonstrated excellent progress this reporting period. DGI, mCTSIB and 6 minute walk test scores each improved. The pt continues to have high level balance deficits and difficulty walking for >15 minutes. Will plan to see patient 1 visit every 2-4 weeks to ensure pt continues to progress and assist with return to prior level of function.  Certification date from 10/16/18 to 12/20/18.    Griselda Loera, PT          I CERTIFY THE NEED FOR THESE SERVICES FURNISHED UNDER        THIS PLAN OF TREATMENT AND WHILE UNDER MY CARE .             Physician Signature               Date    X_____________________________________________________                      Referring Provider: Regan Maurice MD

## 2018-11-13 ENCOUNTER — HOSPITAL ENCOUNTER (OUTPATIENT)
Dept: PHYSICAL THERAPY | Facility: CLINIC | Age: 68
Setting detail: THERAPIES SERIES
End: 2018-11-13
Attending: FAMILY MEDICINE
Payer: MEDICARE

## 2018-11-13 PROCEDURE — 40000719 ZZHC STATISTIC PT DEPARTMENT NEURO VISIT: Performed by: PHYSICAL THERAPIST

## 2018-11-13 PROCEDURE — 97112 NEUROMUSCULAR REEDUCATION: CPT | Mod: GP | Performed by: PHYSICAL THERAPIST

## 2018-12-04 ENCOUNTER — HOSPITAL ENCOUNTER (OUTPATIENT)
Dept: CARDIOLOGY | Facility: CLINIC | Age: 68
End: 2018-12-04
Attending: INTERNAL MEDICINE
Payer: MEDICARE

## 2018-12-04 ENCOUNTER — HOSPITAL ENCOUNTER (OUTPATIENT)
Dept: CARDIOLOGY | Facility: CLINIC | Age: 68
Discharge: HOME | End: 2018-12-04
Attending: INTERNAL MEDICINE | Admitting: INTERNAL MEDICINE
Payer: MEDICARE

## 2018-12-04 DIAGNOSIS — I34.0 NON-RHEUMATIC MITRAL REGURGITATION: ICD-10-CM

## 2018-12-04 DIAGNOSIS — I71.9 DESCENDING AORTIC ANEURYSM (H): ICD-10-CM

## 2018-12-04 DIAGNOSIS — Z95.2 S/P AVR: ICD-10-CM

## 2018-12-04 LAB
CREAT BLD-MCNC: 1.2 MG/DL (ref 0.66–1.25)
GFR SERPL CREATININE-BSD FRML MDRD: 60 ML/MIN/1.7M2
RADIOLOGIST FLAGS: ABNORMAL

## 2018-12-04 PROCEDURE — 93306 TTE W/DOPPLER COMPLETE: CPT

## 2018-12-04 PROCEDURE — 25000128 H RX IP 250 OP 636: Performed by: INTERNAL MEDICINE

## 2018-12-04 PROCEDURE — 82565 ASSAY OF CREATININE: CPT

## 2018-12-04 PROCEDURE — 93306 TTE W/DOPPLER COMPLETE: CPT | Mod: 26 | Performed by: INTERNAL MEDICINE

## 2018-12-04 PROCEDURE — 71275 CT ANGIOGRAPHY CHEST: CPT

## 2018-12-04 RX ORDER — DIPHENHYDRAMINE HYDROCHLORIDE 50 MG/ML
25-50 INJECTION INTRAMUSCULAR; INTRAVENOUS
Status: DISCONTINUED | OUTPATIENT
Start: 2018-12-04 | End: 2018-12-05 | Stop reason: HOSPADM

## 2018-12-04 RX ORDER — DIPHENHYDRAMINE HCL 25 MG
25 CAPSULE ORAL
Status: DISCONTINUED | OUTPATIENT
Start: 2018-12-04 | End: 2018-12-05 | Stop reason: HOSPADM

## 2018-12-04 RX ORDER — ACYCLOVIR 200 MG/1
0-1 CAPSULE ORAL
Status: DISCONTINUED | OUTPATIENT
Start: 2018-12-04 | End: 2018-12-05 | Stop reason: HOSPADM

## 2018-12-04 RX ORDER — ONDANSETRON 2 MG/ML
4 INJECTION INTRAMUSCULAR; INTRAVENOUS
Status: DISCONTINUED | OUTPATIENT
Start: 2018-12-04 | End: 2018-12-05 | Stop reason: HOSPADM

## 2018-12-04 RX ORDER — METHYLPREDNISOLONE SODIUM SUCCINATE 125 MG/2ML
125 INJECTION, POWDER, LYOPHILIZED, FOR SOLUTION INTRAMUSCULAR; INTRAVENOUS
Status: DISCONTINUED | OUTPATIENT
Start: 2018-12-04 | End: 2018-12-05 | Stop reason: HOSPADM

## 2018-12-04 RX ORDER — IOPAMIDOL 755 MG/ML
500 INJECTION, SOLUTION INTRAVASCULAR ONCE
Status: COMPLETED | OUTPATIENT
Start: 2018-12-04 | End: 2018-12-04

## 2018-12-04 RX ADMIN — IOPAMIDOL 100 ML: 755 INJECTION, SOLUTION INTRAVENOUS at 10:05

## 2018-12-04 RX ADMIN — SODIUM CHLORIDE 100 ML: 9 INJECTION, SOLUTION INTRAVENOUS at 10:05

## 2018-12-12 ENCOUNTER — OFFICE VISIT (OUTPATIENT)
Dept: CARDIOLOGY | Facility: CLINIC | Age: 68
End: 2018-12-12
Attending: INTERNAL MEDICINE
Payer: COMMERCIAL

## 2018-12-12 VITALS
WEIGHT: 198 LBS | HEIGHT: 71 IN | SYSTOLIC BLOOD PRESSURE: 144 MMHG | BODY MASS INDEX: 27.72 KG/M2 | DIASTOLIC BLOOD PRESSURE: 82 MMHG | HEART RATE: 66 BPM

## 2018-12-12 DIAGNOSIS — I34.0 NON-RHEUMATIC MITRAL REGURGITATION: ICD-10-CM

## 2018-12-12 DIAGNOSIS — Z95.2 S/P AVR: ICD-10-CM

## 2018-12-12 PROCEDURE — 99207 ZZC NO CHARGE LOS: CPT | Performed by: INTERNAL MEDICINE

## 2018-12-12 RX ORDER — METOPROLOL TARTRATE 50 MG
50 TABLET ORAL 2 TIMES DAILY
COMMUNITY
End: 2019-03-07

## 2018-12-12 RX ORDER — LANOLIN ALCOHOL/MO/W.PET/CERES
3 CREAM (GRAM) TOPICAL
COMMUNITY

## 2018-12-12 RX ORDER — LORATADINE 10 MG/1
10 TABLET ORAL DAILY
COMMUNITY

## 2018-12-12 RX ORDER — TERAZOSIN 5 MG/1
5 CAPSULE ORAL AT BEDTIME
COMMUNITY

## 2018-12-12 RX ORDER — AMOXICILLIN 250 MG
1 CAPSULE ORAL 2 TIMES DAILY PRN
COMMUNITY

## 2018-12-12 ASSESSMENT — MIFFLIN-ST. JEOR: SCORE: 1679.99

## 2018-12-12 NOTE — LETTER
12/12/2018    [facility] [address]  [place] [address]    RE: Francisco PEREZ Clay       Dear Colleague,    I had the pleasure of seeing Francisco Williamson in the [facility].    Patient left without being seen.    Thank you for allowing me to participate in the care of your patient.      Sincerely,     Jeff Pedro MD     [facility]    cc:   Jeff Pedro MD  [address]  [place] [id number]

## 2018-12-18 ENCOUNTER — TELEPHONE (OUTPATIENT)
Dept: CARDIOLOGY | Facility: CLINIC | Age: 68
End: 2018-12-18

## 2018-12-18 NOTE — TELEPHONE ENCOUNTER
Pt called I his OV was canceled because epic was not working for  that day. Reviewed CT and echo with Pt also informing him that  had not reviewed tests with nurse. Pt does have area in LLL with consolidation or Nodule. Informed Pt would send results to PMD to further review and discuss. Pt also has OV with DR Pedro 2/5/19 to review tests , and Pt said he would keep that appointment.  JENS Barros RN

## 2019-01-14 ENCOUNTER — TRANSFERRED RECORDS (OUTPATIENT)
Dept: HEALTH INFORMATION MANAGEMENT | Facility: CLINIC | Age: 69
End: 2019-01-14

## 2019-01-14 LAB
ALBUMIN SERPL-MCNC: 3.9 G/DL (ref 3.5–4.8)
ALP SERPL-CCNC: 54 U/L (ref 39–117)
ALT SERPL-CCNC: 13 U/L (ref ?–44)
ANION GAP SERPL CALCULATED.3IONS-SCNC: ABNORMAL MMOL/L
AST SERPL-CCNC: 10 U/L (ref ?–40)
BILIRUB SERPL-MCNC: 0.3 MG/DL (ref 0–1.2)
BUN SERPL-MCNC: 18 MG/DL (ref 8–27)
CALCIUM SERPL-MCNC: 8.7 MG/DL (ref 8.6–10.2)
CHLORIDE SERPLBLD-SCNC: 100 MMOL/L (ref 96–106)
CHOLEST SERPL-MCNC: 150 MG/DL (ref ?–199)
CO2 SERPL-SCNC: 25 MMOL/L (ref 20–29)
CREAT SERPL-MCNC: 1.2 MG/DL (ref 0.76–1.27)
GFR SERPL CREATININE-BSD FRML MDRD: 61 ML/MIN/1.73M2 (ref 59–?)
GLUCOSE SERPL-MCNC: 102 MG/DL (ref 70–99)
HDLC SERPL-MCNC: 48 MG/DL (ref 39–?)
LDLC SERPL CALC-MCNC: 76 MG/DL (ref ?–99)
NONHDLC SERPL-MCNC: NORMAL MG/DL
POTASSIUM SERPL-SCNC: 4.8 MMOL/L (ref 3.5–5.2)
PROT SERPL-MCNC: 6.5 G/DL (ref 6–8.5)
SODIUM SERPL-SCNC: 135 MMOL/L (ref 134–144)
TRIGL SERPL-MCNC: 131 MG/DL (ref ?–149)
TSH SERPL-ACNC: 2.67 MCU/ML (ref 0.45–4.5)

## 2019-01-25 ENCOUNTER — DOCUMENTATION ONLY (OUTPATIENT)
Dept: CARDIOLOGY | Facility: CLINIC | Age: 69
End: 2019-01-25

## 2019-01-29 ENCOUNTER — TELEPHONE (OUTPATIENT)
Dept: CARDIOLOGY | Facility: CLINIC | Age: 69
End: 2019-01-29

## 2019-01-29 NOTE — TELEPHONE ENCOUNTER
Pt called in asking about upcoming appointment and time allotment. Informed Pt his appointment is 30 min. Pt then questioned why they were unable to trach him after traumatic head injury.  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 Pt will see DR Pedro 2/5/19. JENS Barros RN

## 2019-02-18 ENCOUNTER — TRANSFERRED RECORDS (OUTPATIENT)
Dept: HEALTH INFORMATION MANAGEMENT | Facility: CLINIC | Age: 69
End: 2019-02-18

## 2019-03-07 ENCOUNTER — OFFICE VISIT (OUTPATIENT)
Dept: CARDIOLOGY | Facility: CLINIC | Age: 69
End: 2019-03-07
Payer: COMMERCIAL

## 2019-03-07 VITALS
WEIGHT: 197 LBS | HEIGHT: 71 IN | SYSTOLIC BLOOD PRESSURE: 158 MMHG | BODY MASS INDEX: 27.58 KG/M2 | DIASTOLIC BLOOD PRESSURE: 80 MMHG | HEART RATE: 66 BPM

## 2019-03-07 DIAGNOSIS — I35.9 AORTIC VALVE DISORDER: Primary | ICD-10-CM

## 2019-03-07 PROCEDURE — 99215 OFFICE O/P EST HI 40 MIN: CPT | Performed by: INTERNAL MEDICINE

## 2019-03-07 RX ORDER — METOPROLOL TARTRATE 50 MG
25 TABLET ORAL 2 TIMES DAILY
Qty: 1 TABLET | Refills: 0 | COMMUNITY
Start: 2019-03-07 | End: 2021-03-01

## 2019-03-07 ASSESSMENT — MIFFLIN-ST. JEOR: SCORE: 1675.46

## 2019-03-07 NOTE — LETTER
"3/7/2019      [facility] [address]  [place] [address]      RE: Francisco Williamson       Dear Colleague,    I had the pleasure of seeing Francisco Williamson in the [facility].    Service Date: 03/07/2019      HISTORY OF PRESENT ILLNESS:  I had the pleasure of following up on our mutual patient, Francisco Williamson.  Today's visit was much longer.  It was literally a 1 hour and 10 minute visit.  Unbeknownst to me, the patient, as you no doubt know, was vacationing in [place], fell, struck his head, and as you know he is on Coumadin.  He seemed fine for the first several days and then approximately 6 days later started having vomiting.  Eventually, he was transferred to a higher level hospital in [place] and then to [facility].  He was apparently nearly comatose or comatose.  He had a subdural hematoma.  I believe he ended up having a logan hole and drainage.  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  They did echocardiograms there which suggested severe aortic valve prosthesis abnormality, but we were \"fooled\" once before and we have fluoro'ed the valve and it was working well.  Indeed, our followup echo just done a couple months ago again shows mean gradient of 20 mm across the aortic valve prosthesis, not the 40-50 that they were getting.  The ejection fraction is normal.  The patient has frequent PVCs when he was in the hospital that was known.  He has been worrying about a descending aortic aneurysm.  As you know, he has Marfan's type physiology but his fibrillin test was negative.  As you know, he had more than 2 decades ago aortic valve metal replacement with an ascending conduit, and the CAT " scan from a year ago had a misprint.  In the body of the report it was correct when it said the ascending aorta was 45 mm above the graft and the descending aorta was normal; however, in the summary of that CAT scan from a year ago it said the descending was 45 mm, which is a mistake.  The current CAT scan, which I gave him a copy, confirms the correct that his ascending is 45 mm, which is stable, the descending is normal, and again, the right innominate aneurysm and bovine branching pattern were noted again.  As you know, the patient has been anemic.  You have been giving him iron.  The presumption is that it is leaking from somewhere in the intestines, since I understand it is iron deficiency by workup.  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  He also has a remote history of a gastric ulcer, erosive esophagitis and a remote history of Kanika-Kulkarni tear.  Less likely is that this is hemolysis of the red blood cells through the aortic valve.  I trust, since you are giving him iron and following it, that the iron levels really were low, but you might want to consider getting an LDH and a haptoglobin level just to make sure it is not hemolysis through the valve.      We briefly talked and I had mentioned it last year, if bleeding becomes a catastrophic issue, although heroic, we could take him back to surgery to replace the metal aortic valve with a porcine valve, and then later in life if that fails, we could do TAVR.  TAVR is obviously not an option through a metal aortic valve.  Again, today's office visit was over an hour because I had to go through literally copious amounts of records from [facility] and I went through them with the patient present and explained each of the findings.  The patient does home INRs weekly, which I think is good.  He wants to take turmeric, and he showed me a bottle of a natural turmeric, and I said I was  very concerned it would interact with Coumadin, and he indeed found that to be the case.  He wanted to know if he could adjust his Coumadin around the turmeric, and I said I am very concerned that the over-the-counter medicines are probably not reliable about how much medicine is in each capsule, from capsule to capsule, or from bottle to bottle, and I would discourage that, but he may talk to you about that.  At this point, I think he is stable.  He is slowly getting his strength back.  We will see him back in 6 months for a routine office visit.  We probably do not need another echo for a year.  We can look at all of his other valves.       The next issue is his blood pressure is running high here, and this is a gentleman with ascending aortic aneurysm.  Apparently he was switched from Tenormin, which he was doing fine on, to metoprolol, and then when they did that it was the regular metoprolol b.i.d., and his heart rate got to the 40s, so either he or they dropped him to regular metoprolol 50 mg at night.  It is really supposed to be a b.i.d. drug, and his blood pressure is quite high today.  I do not know if that is the issue or not.  He said he has a large bottle of the metoprolol tartrate, so I told him to cut the 50-mg pill in half and instead of taking 50 each day at bedtime, he will take 25 b.i.d.  I would probably switch him to Toprol-XL when he needs a refill.  You are going to be seeing him in a week to follow up on an abnormality in his lung from a CAT scan.  If his blood pressure is still high and his heart rate is above 55, I would increase the Toprol.  One could do an in-between dose of perhaps 50 in the morning, 25 in the evening.  He is on an ACE inhibitor, benazepril, and that dose could be increased.  If the blood pressure is still high, I would either add hydralazine and/or a diuretic as treatment.  I really would like to make sure his blood pressure numbers are normal.  He tells me he checks  his blood pressure in the morning and it is fine.  I am wondering if the metoprolol is simply wearing out, because I am seeing him at 6:00 p.m.        cc:   Regan Maurice MD    [facility]    [address], [address]    [place]  [address]         CRISSY ERNST MD       D: 2019   T: 2019   MT: LISA      Name:     DEN HARKINS   MRN:      [id number]        Account:      [id number]   :      1950           Service Date: 2019      Document: [id number]      Outpatient Encounter Medications as of 3/7/2019   Medication Sig Dispense Refill     amLODIPine (NORVASC) 10 MG tablet Take 10 mg by mouth daily       atorvastatin (LIPITOR) 80 MG tablet Take 80 mg by mouth daily       benazepril (LOTENSIN) 10 MG tablet Take 1 tablet (10 mg) by mouth daily 90 tablet 2     cholecalciferol (VITAMIN D) 1000 UNIT tablet Take 2 tablets by mouth daily. 100 tablet prn     co-enzyme Q-10 100 MG CAPS Take 1 capsule by mouth daily. 90 capsule prn     IRON PO Take by mouth daily       loratadine (CLARITIN) 10 MG tablet Take 10 mg by mouth daily       melatonin 3 MG tablet Take 3 mg by mouth nightly as needed for sleep       metoprolol tartrate (LOPRESSOR) 50 MG tablet Take 0.5 tablets (25 mg) by mouth 2 times daily 1 tablet 0     OMEPRAZOLE PO Take 40 mg by mouth 2 times daily.         ranitidine (RANITIDINE 150 MAX STRENGTH) 150 MG tablet Take 150 mg by mouth 2 times daily       senna-docusate (SENNA S) 8.6-50 MG tablet Take 1 tablet by mouth 2 times daily as needed for constipation       TAMSULOSIN HCL PO        terazosin (HYTRIN) 5 MG capsule Take 5 mg by mouth At Bedtime       Warfarin Sodium (COUMADIN PO) Take 7 mg by mouth every other day        [DISCONTINUED] atenolol (TENORMIN) 100 MG tablet Take 100 mg by mouth daily.       [DISCONTINUED] metoprolol tartrate (LOPRESSOR) 50 MG tablet Take 50 mg by mouth 2 times daily       No facility-administered encounter medications on  file as of 3/7/2019.      Again, thank you for allowing me to participate in the care of your patient.      Sincerely,    Jeff Pedro MD     [facility]

## 2019-03-07 NOTE — LETTER
3/7/2019    Regan Maurice MD  [facility] [address]  [place] [address]    RE: Francisco Williamson       Dear Colleague,    I had the pleasure of seeing Francisco Williamson in the [facility].    HPI and Plan:   See dictation    Orders Placed This Encounter   Procedures     Follow-Up with Cardiac Advanced Practice Provider     Orders Placed This Encounter   Medications     IRON PO     Sig: Take by mouth daily     metoprolol tartrate (LOPRESSOR) 50 MG tablet     Sig: Take 0.5 tablets (25 mg) by mouth 2 times daily     Dispense:  1 tablet     Refill:  0     Medications Discontinued During This Encounter   Medication Reason     atenolol (TENORMIN) 100 MG tablet      metoprolol tartrate (LOPRESSOR) 50 MG tablet Reorder         Encounter Diagnosis   Name Primary?     Aortic valve disorder Yes       CURRENT MEDICATIONS:  Current Outpatient Medications   Medication Sig Dispense Refill     amLODIPine (NORVASC) 10 MG tablet Take 10 mg by mouth daily       atorvastatin (LIPITOR) 80 MG tablet Take 80 mg by mouth daily       benazepril (LOTENSIN) 10 MG tablet Take 1 tablet (10 mg) by mouth daily 90 tablet 2     cholecalciferol (VITAMIN D) 1000 UNIT tablet Take 2 tablets by mouth daily. 100 tablet prn     co-enzyme Q-10 100 MG CAPS Take 1 capsule by mouth daily. 90 capsule prn     IRON PO Take by mouth daily       loratadine (CLARITIN) 10 MG tablet Take 10 mg by mouth daily       melatonin 3 MG tablet Take 3 mg by mouth nightly as needed for sleep       metoprolol tartrate (LOPRESSOR) 50 MG tablet Take 0.5 tablets (25 mg) by mouth 2 times daily 1 tablet 0     OMEPRAZOLE PO Take 40 mg by mouth 2 times daily.         ranitidine (RANITIDINE 150 MAX STRENGTH) 150 MG tablet Take 150 mg by mouth 2 times daily       senna-docusate (SENNA S) 8.6-50 MG tablet Take 1 tablet by mouth 2 times daily as needed for constipation       TAMSULOSIN HCL PO        terazosin (HYTRIN) 5 MG capsule  Take 5 mg by mouth At Bedtime       Warfarin Sodium (COUMADIN PO) Take 7 mg by mouth every other day          ALLERGIES     Allergies   Allergen Reactions     Morphine Sulfate        PAST MEDICAL HISTORY:  Past Medical History:   Diagnosis Date     Aortic valve disease     ascending aortic aneurysm , AVR 1995     CAD (coronary artery disease)     minimal on CT cor angiogram     Colitis     radiation colitis, 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     Cystic medial necrosis (H)     fibrillin test negative for marfan's     Embolic stroke (H)     L eye -when INR low, another CVA when given Vit K before prostate surgery     First degree heart block     and RBBB     Gastro-oesophageal reflux disease      Gastrointestinal bleeding, upper     EGD - GASTRIC ULCER, EROSIVE ESOPHAGITIS, NON BLEEDING ESOPHAGEAL ULCERS, NON BLEEDING GASTRIC ULCER, LILLIE REYNA TEAR     Hyperlipidemia      Hypertension      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)     Intervertebral disk disease     cervicle and lumbar     Intervertebral disk disease     L4/5 disk herniation     Laceration     L wrist with complete severing of radial artery     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 Subdural hematoma was diagnosed on 4/20/2018 after acute neurologic decompensation, and patient was managed with right sided parietal craniectomy and frontal logan hole in [place].Transferred to [facility] 4/27 after he was stabilized. in [place]       PAST SURGICAL HISTORY:  Past Surgical History:   Procedure Laterality Date     AORTIC VALVE  REPLACEMENT  1995    [facility] 27mm [id number] serial# [id number]: aortic valve replacement with composite graft with reimplanation of the coronary arteries into the graft     COLONOSCOPY       craineotomy  04/2018     closed head injury while vacationing in [place] 4/27/2018 and required emergent right subdural hematoma evacuation     INSERT RADIATION SEEDS PROSTATE  3/15/2012    Procedure:INSERT RADIATION SEEDS PROSTATE; INSERT RADIATION SEEDS PROSTATE  - Paladium Seeds  110 seeds inserted; Surgeon:JONATHAN BAY; Location: [facility]     MOHS MICROGRAPHIC PROCEDURE      SCC face     ORTHOPEDIC SURGERY      LEFT ROTATOR CUFF REPAIR     REPAIR ESOPHAGEAL STRICTURE      ?clip in esophagus       FAMILY HISTORY:  Family History   Problem Relation Age of Onset     Heart Murmur Father 50        mitral valve disease-Rheumatic valve disease     Alzheimer Disease Mother 95     Bipolar Disorder Sister      Prostate Cancer Brother      Skin Cancer No family hx of        SOCIAL HISTORY:  Social History     Socioeconomic History     Marital status:      Spouse name: None     Number of children: None     Years of education: None     Highest education level: None   Occupational History     Occupation: retired     Comment: prior medical device exec   Social Needs     Financial resource strain: None     Food insecurity:     Worry: None     Inability: None     Transportation needs:     Medical: None     Non-medical: None   Tobacco Use     Smoking status: Never Smoker     Smokeless tobacco: Never Used   Substance and Sexual Activity     Alcohol use: Yes     Comment: 1 beer nightly & a few on the weekends      Drug use: No     Sexual activity: None   Lifestyle     Physical activity:     Days per week: None     Minutes per session: None     Stress: None   Relationships     Social connections:     Talks on phone: None     Gets together: None     Attends Judaism service: None     Active member of club or organization: None     Attends  "meetings of clubs or organizations: None     Relationship status: None     Intimate partner violence:     Fear of current or ex partner: None     Emotionally abused: None     Physically abused: None     Forced sexual activity: None   Other Topics Concern     Parent/sibling w/ CABG, MI or angioplasty before 65F 55M? Not Asked      Service Not Asked     Blood Transfusions Not Asked     Caffeine Concern Not Asked     Occupational Exposure Not Asked     Hobby Hazards Not Asked     Sleep Concern Not Asked     Stress Concern Not Asked     Weight Concern Not Asked     Special Diet No     Back Care Not Asked     Exercise No     Bike Helmet Not Asked     Seat Belt Not Asked     Self-Exams Not Asked   Social History Narrative     None       Review of Systems:  Skin:  Negative     Eyes:  Negative    ENT:  Negative    Respiratory:  Negative    Cardiovascular:  Negative;Negative for;lightheadedness Positive for  Gastroenterology: Negative    Genitourinary:  not assessed    Musculoskeletal:  Positive for arthritis  Neurologic:  Negative stroke  Psychiatric:  Negative    Heme/Lymph/Imm:  Negative    Endocrine:  Negative      Physical Exam:  Vitals: /80   Pulse 66   Ht 1.803 m (5' 10.98\")   Wt 89.4 kg (197 lb)   BMI 27.49 kg/m       Constitutional:  cooperative, alert and oriented, well developed, well nourished, in no acute distress        Skin:  warm and dry to the touch, no apparent skin lesions or masses noted          Head:      logan hole R skull    Eyes:  pupils equal and round, conjunctivae and lids unremarkable, sclera white, no xanthalasma, EOMS intact, no nystagmus        Lymph:No Cervical lymphadenopathy present;No thyromegaly     ENT:  no pallor or cyanosis, dentition good        Neck:  carotid pulses are full and equal bilaterally, JVP normal, no carotid bruit        Respiratory:  normal breath sounds, clear to auscultation, normal A-P diameter, normal symmetry, normal respiratory excursion, no use of " accessory muscles    mucus cleared after cough    Cardiac: regular rhythm frequent premature beats   crisp prosthetic valve sounds systolic ejection murmur;grade 1        pulses full and equal, no bruits auscultated                                        GI:  abdomen soft, non-tender, BS normoactive, no mass, no HSM, no bruits        Extremities and Muscular Skeletal:  no deformities, clubbing, cyanosis, erythema observed;no edema              Neurological:      weak from disk and logan hole, unstable walk    Psych:  Alert and Oriented x 3      Recent Lab Results:  LIPID RESULTS:  Lab Results   Component Value Date    CHOL 150 01/14/2019    HDL 48 01/14/2019    LDL 76 01/14/2019    TRIG 131 01/14/2019    CHOLHDLRATIO 2.6 08/09/2012       LIVER ENZYME RESULTS:  Lab Results   Component Value Date    AST 10 01/14/2019    ALT 13 01/14/2019       CBC RESULTS:  Lab Results   Component Value Date    WBC 4.8 11/16/2017    RBC 4.43 11/16/2017    HGB 13.6 11/16/2017    HCT 42.6 11/16/2017    MCV 96.3 11/16/2017    MCH 30.6 11/16/2017    MCHC 31.8 11/16/2017    RDW 15.8 11/16/2017     11/16/2017       BMP RESULTS:  Lab Results   Component Value Date     01/14/2019    POTASSIUM 4.8 01/14/2019    CHLORIDE 100 01/14/2019    CO2 25 01/14/2019    ANIONGAP 9.9 03/21/2018     (A) 01/14/2019    BUN 18 01/14/2019    CR 1.20 01/14/2019    GFRESTIMATED 61 01/14/2019    GFRESTBLACK 70 01/14/2019    SACHA 8.7 01/14/2019        A1C RESULTS:  No results found for: A1C    INR RESULTS:  Lab Results   Component Value Date    INR 2.31 (H) 08/08/2015    INR 2.59 (H) 06/23/2012           CC  No referring provider defined for this encounter.    Thank you for allowing me to participate in the care of your patient.      Sincerely,     Jeff Pedro MD     [facility]    cc:   No referring provider defined for this encounter.

## 2019-03-07 NOTE — PROGRESS NOTES
HPI and Plan:   See dictation    Orders Placed This Encounter   Procedures     Follow-Up with Cardiac Advanced Practice Provider     Orders Placed This Encounter   Medications     IRON PO     Sig: Take by mouth daily     metoprolol tartrate (LOPRESSOR) 50 MG tablet     Sig: Take 0.5 tablets (25 mg) by mouth 2 times daily     Dispense:  1 tablet     Refill:  0     Medications Discontinued During This Encounter   Medication Reason     atenolol (TENORMIN) 100 MG tablet      metoprolol tartrate (LOPRESSOR) 50 MG tablet Reorder         Encounter Diagnosis   Name Primary?     Aortic valve disorder Yes       CURRENT MEDICATIONS:  Current Outpatient Medications   Medication Sig Dispense Refill     amLODIPine (NORVASC) 10 MG tablet Take 10 mg by mouth daily       atorvastatin (LIPITOR) 80 MG tablet Take 80 mg by mouth daily       benazepril (LOTENSIN) 10 MG tablet Take 1 tablet (10 mg) by mouth daily 90 tablet 2     cholecalciferol (VITAMIN D) 1000 UNIT tablet Take 2 tablets by mouth daily. 100 tablet prn     co-enzyme Q-10 100 MG CAPS Take 1 capsule by mouth daily. 90 capsule prn     IRON PO Take by mouth daily       loratadine (CLARITIN) 10 MG tablet Take 10 mg by mouth daily       melatonin 3 MG tablet Take 3 mg by mouth nightly as needed for sleep       metoprolol tartrate (LOPRESSOR) 50 MG tablet Take 0.5 tablets (25 mg) by mouth 2 times daily 1 tablet 0     OMEPRAZOLE PO Take 40 mg by mouth 2 times daily.         ranitidine (RANITIDINE 150 MAX STRENGTH) 150 MG tablet Take 150 mg by mouth 2 times daily       senna-docusate (SENNA S) 8.6-50 MG tablet Take 1 tablet by mouth 2 times daily as needed for constipation       TAMSULOSIN HCL PO        terazosin (HYTRIN) 5 MG capsule Take 5 mg by mouth At Bedtime       Warfarin Sodium (COUMADIN PO) Take 7 mg by mouth every other day          ALLERGIES     Allergies   Allergen Reactions     Morphine Sulfate        PAST MEDICAL HISTORY:  Past Medical History:   Diagnosis Date      Aortic valve disease     ascending aortic aneurysm , AVR 1995     CAD (coronary artery disease)     minimal on CT cor angiogram     Colitis     radiation colitis, 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     Cystic medial necrosis (H)     fibrillin test negative for marfan's     Embolic stroke (H)     L eye -when INR low, another CVA when given Vit K before prostate surgery     First degree heart block     and RBBB     Gastro-oesophageal reflux disease      Gastrointestinal bleeding, upper     EGD - GASTRIC ULCER, EROSIVE ESOPHAGITIS, NON BLEEDING ESOPHAGEAL ULCERS, NON BLEEDING GASTRIC ULCER, LILLIE REYNA TEAR     Hyperlipidemia      Hypertension      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 Subdural hematoma was diagnosed on 4/20/2018 after acute neurologic decompensation, and patient was managed with right sided parietal craniectomy and frontal logan hole in [place].Transferred to [facility] 4/27 after he was stabilized. in [place]       PAST SURGICAL HISTORY:  Past Surgical History:   Procedure Laterality Date     AORTIC VALVE REPLACEMENT  1995    [facility] 27mm [id number] serial# [id number]: aortic valve replacement with composite graft with reimplanation of the coronary arteries into the graft     COLONOSCOPY       craineotomy  04/2018     closed head injury while vacationing in  [place] 4/27/2018 and required emergent right subdural hematoma evacuation     INSERT RADIATION SEEDS PROSTATE  3/15/2012    Procedure:INSERT RADIATION SEEDS PROSTATE; INSERT RADIATION SEEDS PROSTATE  - Paladium Seeds  110 seeds inserted; Surgeon:JONATHAN BAY; Location: [facility]     MOHS MICROGRAPHIC PROCEDURE      SCC face     ORTHOPEDIC SURGERY      LEFT ROTATOR CUFF REPAIR     REPAIR ESOPHAGEAL STRICTURE      ?clip in esophagus       FAMILY HISTORY:  Family History   Problem Relation Age of Onset     Heart Murmur Father 50        mitral valve disease-Rheumatic valve disease     Alzheimer Disease Mother 95     Bipolar Disorder Sister      Prostate Cancer Brother      Skin Cancer No family hx of        SOCIAL HISTORY:  Social History     Socioeconomic History     Marital status:      Spouse name: None     Number of children: None     Years of education: None     Highest education level: None   Occupational History     Occupation: retired     Comment: prior medical device exec   Social Needs     Financial resource strain: None     Food insecurity:     Worry: None     Inability: None     Transportation needs:     Medical: None     Non-medical: None   Tobacco Use     Smoking status: Never Smoker     Smokeless tobacco: Never Used   Substance and Sexual Activity     Alcohol use: Yes     Comment: 1 beer nightly & a few on the weekends      Drug use: No     Sexual activity: None   Lifestyle     Physical activity:     Days per week: None     Minutes per session: None     Stress: None   Relationships     Social connections:     Talks on phone: None     Gets together: None     Attends Restorationism service: None     Active member of club or organization: None     Attends meetings of clubs or organizations: None     Relationship status: None     Intimate partner violence:     Fear of current or ex partner: None     Emotionally abused: None     Physically abused: None     Forced sexual activity: None   Other Topics Concern      "Parent/sibling w/ CABG, MI or angioplasty before 65F 55M? Not Asked      Service Not Asked     Blood Transfusions Not Asked     Caffeine Concern Not Asked     Occupational Exposure Not Asked     Hobby Hazards Not Asked     Sleep Concern Not Asked     Stress Concern Not Asked     Weight Concern Not Asked     Special Diet No     Back Care Not Asked     Exercise No     Bike Helmet Not Asked     Seat Belt Not Asked     Self-Exams Not Asked   Social History Narrative     None       Review of Systems:  Skin:  Negative     Eyes:  Negative    ENT:  Negative    Respiratory:  Negative    Cardiovascular:  Negative;Negative for;lightheadedness Positive for  Gastroenterology: Negative    Genitourinary:  not assessed    Musculoskeletal:  Positive for arthritis  Neurologic:  Negative stroke  Psychiatric:  Negative    Heme/Lymph/Imm:  Negative    Endocrine:  Negative      Physical Exam:  Vitals: /80   Pulse 66   Ht 1.803 m (5' 10.98\")   Wt 89.4 kg (197 lb)   BMI 27.49 kg/m      Constitutional:  cooperative, alert and oriented, well developed, well nourished, in no acute distress        Skin:  warm and dry to the touch, no apparent skin lesions or masses noted          Head:      logan hole R skull    Eyes:  pupils equal and round, conjunctivae and lids unremarkable, sclera white, no xanthalasma, EOMS intact, no nystagmus        Lymph:No Cervical lymphadenopathy present;No thyromegaly     ENT:  no pallor or cyanosis, dentition good        Neck:  carotid pulses are full and equal bilaterally, JVP normal, no carotid bruit        Respiratory:  normal breath sounds, clear to auscultation, normal A-P diameter, normal symmetry, normal respiratory excursion, no use of accessory muscles    mucus cleared after cough    Cardiac: regular rhythm frequent premature beats   crisp prosthetic valve sounds systolic ejection murmur;grade 1        pulses full and equal, no bruits auscultated                                        GI:  " abdomen soft, non-tender, BS normoactive, no mass, no HSM, no bruits        Extremities and Muscular Skeletal:  no deformities, clubbing, cyanosis, erythema observed;no edema              Neurological:      weak from disk and logan hole, unstable walk    Psych:  Alert and Oriented x 3      Recent Lab Results:  LIPID RESULTS:  Lab Results   Component Value Date    CHOL 150 01/14/2019    HDL 48 01/14/2019    LDL 76 01/14/2019    TRIG 131 01/14/2019    CHOLHDLRATIO 2.6 08/09/2012       LIVER ENZYME RESULTS:  Lab Results   Component Value Date    AST 10 01/14/2019    ALT 13 01/14/2019       CBC RESULTS:  Lab Results   Component Value Date    WBC 4.8 11/16/2017    RBC 4.43 11/16/2017    HGB 13.6 11/16/2017    HCT 42.6 11/16/2017    MCV 96.3 11/16/2017    MCH 30.6 11/16/2017    MCHC 31.8 11/16/2017    RDW 15.8 11/16/2017     11/16/2017       BMP RESULTS:  Lab Results   Component Value Date     01/14/2019    POTASSIUM 4.8 01/14/2019    CHLORIDE 100 01/14/2019    CO2 25 01/14/2019    ANIONGAP 9.9 03/21/2018     (A) 01/14/2019    BUN 18 01/14/2019    CR 1.20 01/14/2019    GFRESTIMATED 61 01/14/2019    GFRESTBLACK 70 01/14/2019    SACHA 8.7 01/14/2019        A1C RESULTS:  No results found for: A1C    INR RESULTS:  Lab Results   Component Value Date    INR 2.31 (H) 08/08/2015    INR 2.59 (H) 06/23/2012           CC  No referring provider defined for this encounter.

## 2019-03-08 NOTE — PROGRESS NOTES
"Service Date: 03/07/2019      HISTORY OF PRESENT ILLNESS:  I had the pleasure of following up on our mutual patient, Francisco Williamson.  Today's visit was much longer.  It was literally a 1 hour and 10 minute visit.  Unbeknownst to me, the patient, as you no doubt know, was vacationing in [place], fell, struck his head, and as you know he is on Coumadin.  He seemed fine for the first several days and then approximately 6 days later started having vomiting.  Eventually, he was transferred to a higher level hospital in [place] and then to [facility].  He was apparently nearly comatose or comatose.  He had a subdural hematoma.  I believe he ended up having a logan hole and drainage.  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  They did echocardiograms there which suggested severe aortic valve prosthesis abnormality, but we were \"fooled\" once before and we have fluoro'ed the valve and it was working well.  Indeed, our followup echo just done a couple months ago again shows mean gradient of 20 mm across the aortic valve prosthesis, not the 40-50 that they were getting.  The ejection fraction is normal.  The patient has frequent PVCs when he was in the hospital that was known.  He has been worrying about a descending aortic aneurysm.  As you know, he has Marfan's type physiology but his fibrillin test was negative.  As you know, he had more than 2 decades ago aortic valve metal replacement with an ascending conduit, and the CAT scan from a year ago had a misprint.  In the body of the report it was correct when it said the ascending aorta was 45 mm above the graft and the descending aorta was normal; however, in the summary of that CAT scan from a year ago it said the descending " was 45 mm, which is a mistake.  The current CAT scan, which I gave him a copy, confirms the correct that his ascending is 45 mm, which is stable, the descending is normal, and again, the right innominate aneurysm and bovine branching pattern were noted again.  As you know, the patient has been anemic.  You have been giving him iron.  The presumption is that it is leaking from somewhere in the intestines, since I understand it is iron deficiency by workup.  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  He also has a remote history of a gastric ulcer, erosive esophagitis and a remote history of Kanika-Kulkarni tear.  Less likely is that this is hemolysis of the red blood cells through the aortic valve.  I trust, since you are giving him iron and following it, that the iron levels really were low, but you might want to consider getting an LDH and a haptoglobin level just to make sure it is not hemolysis through the valve.      We briefly talked and I had mentioned it last year, if bleeding becomes a catastrophic issue, although heroic, we could take him back to surgery to replace the metal aortic valve with a porcine valve, and then later in life if that fails, we could do TAVR.  TAVR is obviously not an option through a metal aortic valve.  Again, today's office visit was over an hour because I had to go through literally copious amounts of records from [facility] and I went through them with the patient present and explained each of the findings.  The patient does home INRs weekly, which I think is good.  He wants to take turmeric, and he showed me a bottle of a natural turmeric, and I said I was very concerned it would interact with Coumadin, and he indeed found that to be the case.  He wanted to know if he could adjust his Coumadin around the turmeric, and I said I am very concerned that the over-the-counter medicines are probably not reliable  about how much medicine is in each capsule, from capsule to capsule, or from bottle to bottle, and I would discourage that, but he may talk to you about that.  At this point, I think he is stable.  He is slowly getting his strength back.  We will see him back in 6 months for a routine office visit.  We probably do not need another echo for a year.  We can look at all of his other valves.       The next issue is his blood pressure is running high here, and this is a gentleman with ascending aortic aneurysm.  Apparently he was switched from Tenormin, which he was doing fine on, to metoprolol, and then when they did that it was the regular metoprolol b.i.d., and his heart rate got to the 40s, so either he or they dropped him to regular metoprolol 50 mg at night.  It is really supposed to be a b.i.d. drug, and his blood pressure is quite high today.  I do not know if that is the issue or not.  He said he has a large bottle of the metoprolol tartrate, so I told him to cut the 50-mg pill in half and instead of taking 50 each day at bedtime, he will take 25 b.i.d.  I would probably switch him to Toprol-XL when he needs a refill.  You are going to be seeing him in a week to follow up on an abnormality in his lung from a CAT scan.  If his blood pressure is still high and his heart rate is above 55, I would increase the Toprol.  One could do an in-between dose of perhaps 50 in the morning, 25 in the evening.  He is on an ACE inhibitor, benazepril, and that dose could be increased.  If the blood pressure is still high, I would either add hydralazine and/or a diuretic as treatment.  I really would like to make sure his blood pressure numbers are normal.  He tells me he checks his blood pressure in the morning and it is fine.  I am wondering if the metoprolol is simply wearing out, because I am seeing him at 6:00 p.m.        cc:   Regan Maurice MD    [facility]    [address], [address]    [place]  [place]  [address]         CRISYS ERNST MD             D: 2019   T: 2019   MT: LISA      Name:     DEN HARKINS   MRN:      [id number]        Account:      [id number]   :      1950           Service Date: 2019      Document: [id number]

## 2019-03-19 ENCOUNTER — TRANSFERRED RECORDS (OUTPATIENT)
Dept: HEALTH INFORMATION MANAGEMENT | Facility: CLINIC | Age: 69
End: 2019-03-19

## 2019-03-25 ENCOUNTER — TRANSFERRED RECORDS (OUTPATIENT)
Dept: HEALTH INFORMATION MANAGEMENT | Facility: CLINIC | Age: 69
End: 2019-03-25

## 2019-04-19 ENCOUNTER — TELEPHONE (OUTPATIENT)
Dept: CARDIOLOGY | Facility: CLINIC | Age: 69
End: 2019-04-19

## 2019-04-19 ENCOUNTER — MYC MEDICAL ADVICE (OUTPATIENT)
Dept: CARDIOLOGY | Facility: CLINIC | Age: 69
End: 2019-04-19

## 2019-04-19 DIAGNOSIS — Z95.2 S/P AVR: Primary | ICD-10-CM

## 2019-04-19 NOTE — TELEPHONE ENCOUNTER
I don't know who is running his inr so I dont know his current inr level  4 days before gi procedure stop coumadin  Daily inr  When inr <2 start lovenox 1mg/kg subcutaneous bid  Stop lovenox 12hr before procedure  Restart coumadin day of gi procedure  And if ok with gi restart lovenox the following morning after gi procedure (or the night of procedure if ok with gi)  Daily inr until >2 and stop lovenox  He is hi risk due to cva when inr was low hence this aggressive protocol but he also has presumptive gi bleed history

## 2019-04-19 NOTE — TELEPHONE ENCOUNTER
Pt called in he is scheduled for a upper endoscopy, and needs hold order for coumadin (he is having throat issue, and needs it stretched again per Pt), and if there are bridging orders. Pt is metal aortic valve, and Pt who had subdural hematoma after fall in [place].  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  JENS Barros RN

## 2019-06-04 DIAGNOSIS — I10 BENIGN ESSENTIAL HYPERTENSION: ICD-10-CM

## 2019-06-04 RX ORDER — BENAZEPRIL HYDROCHLORIDE 10 MG/1
10 TABLET ORAL DAILY
Qty: 90 TABLET | Refills: 2 | Status: SHIPPED | OUTPATIENT
Start: 2019-06-04 | End: 2020-02-24

## 2019-07-17 ENCOUNTER — TELEPHONE (OUTPATIENT)
Dept: CARDIOLOGY | Facility: CLINIC | Age: 69
End: 2019-07-17

## 2019-07-17 NOTE — TELEPHONE ENCOUNTER
Pt called in asking if he should or should not hold coumadin for cataract surgery. Pt says that paperwork he has does not require him to hold it. Informed Pt if they do not require holding coumadin then he does not need to. Did ask Pt to confirm this with cataract surgeons office, and call back if something changes.  JENS Barros RN

## 2019-10-04 ENCOUNTER — TELEPHONE (OUTPATIENT)
Dept: SLEEP MEDICINE | Facility: CLINIC | Age: 69
End: 2019-10-04

## 2019-10-04 NOTE — TELEPHONE ENCOUNTER
Pt calling in saying he is out of network for [employer], and now has [employer] insurance. Pt said he could pay to see DR Pedro, but echo would be expensive, and did DR Pedro order echos out of network. Informed Pt he could discuss echo with PMD they can also order. JENS Barros RN

## 2020-01-13 ENCOUNTER — TRANSFERRED RECORDS (OUTPATIENT)
Dept: HEALTH INFORMATION MANAGEMENT | Facility: CLINIC | Age: 70
End: 2020-01-13

## 2020-01-14 LAB
ALBUMIN SERPL-MCNC: 4.1 G/DL (ref 3.5–4.8)
ALP SERPL-CCNC: 69 U/L (ref 39–117)
ALT SERPL-CCNC: 19 U/L (ref 0–44)
ANION GAP SERPL CALCULATED.3IONS-SCNC: NORMAL MMOL/L
AST SERPL-CCNC: 20 U/L (ref 0–40)
BILIRUB SERPL-MCNC: 0.5 MG/DL (ref 0–1.2)
BUN SERPL-MCNC: 16 MG/DL (ref 8–27)
CALCIUM SERPL-MCNC: 8.8 MG/DL (ref 8.6–10.2)
CHLORIDE SERPLBLD-SCNC: 101 MMOL/L (ref 96–106)
CHOLEST SERPL-MCNC: 171 MG/DL (ref 100–199)
CO2 SERPL-SCNC: 26 MMOL/L (ref 20–29)
CREAT SERPL-MCNC: 0.92 MG/DL (ref 0.76–1.27)
GFR SERPL CREATININE-BSD FRML MDRD: 83 ML/MIN/1.73M2 (ref 59–?)
GLUCOSE SERPL-MCNC: 88 MG/DL (ref 70–99)
HDLC SERPL-MCNC: 53 MG/DL (ref 39–?)
LDLC SERPL CALC-MCNC: 91 MG/DL (ref 0–99)
NONHDLC SERPL-MCNC: NORMAL MG/DL
POTASSIUM SERPL-SCNC: 4.2 MMOL/L (ref 3.5–5.2)
PROT SERPL-MCNC: 6.7 G/DL (ref 6–8.5)
SODIUM SERPL-SCNC: 140 MMOL/L (ref 134–144)
TRIGL SERPL-MCNC: 133 MG/DL (ref 0–149)
TSH SERPL-ACNC: 2.24 MCU/ML (ref 0.45–4.5)

## 2020-01-20 ENCOUNTER — DOCUMENTATION ONLY (OUTPATIENT)
Dept: CARDIOLOGY | Facility: CLINIC | Age: 70
End: 2020-01-20

## 2020-01-29 ENCOUNTER — OFFICE VISIT (OUTPATIENT)
Dept: CARDIOLOGY | Facility: CLINIC | Age: 70
End: 2020-01-29
Attending: INTERNAL MEDICINE
Payer: COMMERCIAL

## 2020-01-29 VITALS
BODY MASS INDEX: 27.58 KG/M2 | DIASTOLIC BLOOD PRESSURE: 87 MMHG | SYSTOLIC BLOOD PRESSURE: 138 MMHG | HEART RATE: 56 BPM | HEIGHT: 71 IN | WEIGHT: 197 LBS

## 2020-01-29 DIAGNOSIS — I35.9 AORTIC VALVE DISORDER: Primary | ICD-10-CM

## 2020-01-29 PROCEDURE — 99214 OFFICE O/P EST MOD 30 MIN: CPT | Performed by: INTERNAL MEDICINE

## 2020-01-29 RX ORDER — METOPROLOL SUCCINATE 100 MG/1
100 TABLET, EXTENDED RELEASE ORAL DAILY
COMMUNITY

## 2020-01-29 ASSESSMENT — MIFFLIN-ST. JEOR: SCORE: 1670.72

## 2020-01-29 NOTE — PROGRESS NOTES
Service Date: 2020      Regan Maurice MD   [facility]     [address] [place] [address]      RE: Francisco Williamson    MRN: [id number]   : 1950      Dear Dr. Maurice:       I had the pleasure of following up on our mutual patient Francisco Williamson.  As you know, he is a delightful, 69-year-old gentleman.  Back in , he had a conduit [place] aortic valve replacement with an ascending conduit and reimplantation of the coronary arteries.  He had minimal coronary artery disease at the time.  He has done well over the interim.  At one point, we thought there was some valve dysfunction with high gradients across the aortic valve.  We did follow up, including a fluoroscopy, and it was normal, and then followup echo showed a mean gradient across the aortic valve at 20 mmHg.  As you know, the patient a couple years ago had a fall in [place] and had a subdural bleed.  He is obviously on Coumadin because of the mechanical valve.  [place] he had rib fractures, but no other untoward effects.  He does not need any surgical procedure as opposed to when he had the head bleed, and he had a bur hole.  Echocardiogram was done through [facility], and I have the report, but not the actual films.  By report, the LV shows mild enlargement at 59 mm.  There is again mild LVH.  The ejection fraction is normal.  The left atrium is severely enlarged, which we saw previously.  The right heart is normal.  We had a previous echo suggesting moderate MR.  The more recent echo reports only mild MR and again a 20 mm aortic valve gradient, which although slightly on the high end is completely stable.  We know his aorta is mildly dilated to approximately 45 mm.  We have been doing periodic CAT scans of that.  The patient reports he just had another CAT scan recently to follow up on a lung nodule.  He told me that they did not find any issue with  the lung nodule, but we do not know if the aorta was imaged at that point.        My plan is to repeat his echocardiogram and his CAT scan of his aorta in  to make sure that there is again no change in heart function and also to make sure the aorta is not expanding further past the graft.  The patient reports no chest pain problems.  Regarding the falls, he does admit to a balance issue.  On an extreme case, we could reoperate and put in a porcine valve at this point, and then he would not need to be on Coumadin long term.  Again, I bring this up because this is his second significant fall with injury in 2 years.  Lab tests from your office are reviewed, including lipids and electrolytes, and they are all quite reasonable.  At this point, then, I have made no changes.  I will see him back next year for a regular visit, and we will plan in  a repeat echo and CAT scan of the aorta, and I will ask if you could kindly forward electrolytes and lipids next year.      Today's visit was 30 minutes, greater than 50% counseling.      Sincerely,      MD CRISSY Long MD             D: 2020   T: 2020   MT: salena      Name:     DEN HARKINS   MRN:      [id number]        Account:      [id number]   :      1950           Service Date: 2020      Document: [id number]

## 2020-01-29 NOTE — PROGRESS NOTES
HPI and Plan:   See dictation    No orders of the defined types were placed in this encounter.    Orders Placed This Encounter   Medications     metoprolol succinate ER (TOPROL-XL) 100 MG 24 hr tablet     Sig: Take 100 mg by mouth     Medications Discontinued During This Encounter   Medication Reason     ranitidine (RANITIDINE 150 MAX STRENGTH) 150 MG tablet Stopped by Patient     TAMSULOSIN HCL PO Stopped by Patient         No diagnosis found.    CURRENT MEDICATIONS:  Current Outpatient Medications   Medication Sig Dispense Refill     amLODIPine (NORVASC) 10 MG tablet Take 10 mg by mouth daily       atorvastatin (LIPITOR) 80 MG tablet Take 80 mg by mouth daily       benazepril (LOTENSIN) 10 MG tablet Take 1 tablet (10 mg) by mouth daily 90 tablet 2     cholecalciferol (VITAMIN D) 1000 UNIT tablet Take 2 tablets by mouth daily. 100 tablet prn     co-enzyme Q-10 100 MG CAPS Take 1 capsule by mouth daily. 90 capsule prn     IRON PO Take by mouth daily       loratadine (CLARITIN) 10 MG tablet Take 10 mg by mouth daily       melatonin 3 MG tablet Take 3 mg by mouth nightly as needed for sleep       metoprolol succinate ER (TOPROL-XL) 100 MG 24 hr tablet Take 100 mg by mouth       metoprolol tartrate (LOPRESSOR) 50 MG tablet Take 0.5 tablets (25 mg) by mouth 2 times daily 1 tablet 0     OMEPRAZOLE PO Take 40 mg by mouth 2 times daily.         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      Cystic medial necrosis (H)     fibrillin test negative for marfan's     Embolic stroke (H)     L eye -when INR low, another CVA when given Vit K before prostate surgery     First degree heart block     and RBBB     Gastro-oesophageal reflux disease      Gastrointestinal bleeding, upper     EGD - GASTRIC ULCER, EROSIVE ESOPHAGITIS, NON BLEEDING ESOPHAGEAL ULCERS, NON BLEEDING GASTRIC ULCER, LILLIE REYNA TEAR     Hyperlipidemia      Hypertension      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)     Intervertebral disk disease     cervicle and lumbar     Intervertebral disk disease     L4/5 disk herniation     Laceration     L wrist with complete severing of radial artery     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 Subdural hematoma was diagnosed on 4/20/2018 after acute neurologic decompensation, and patient was managed with right sided parietal craniectomy and frontal logan hole in [place].Transferred to [facility] 4/27 after he was stabilized. in [place]       PAST SURGICAL HISTORY:  Past Surgical History:   Procedure Laterality Date     AORTIC VALVE REPLACEMENT  1995    [facility] 27mm [id number] serial# [id number]: aortic valve replacement with composite graft with reimplanation of the coronary arteries into the graft     COLONOSCOPY       craineotomy  04/2018     closed head injury while vacationing in [place] 4/27/2018 and required emergent right subdural hematoma evacuation     H CATARACT SURGICAL PACKAGE Bilateral 2020     bilat     INSERT RADIATION SEEDS PROSTATE  3/15/2012    Procedure:INSERT RADIATION SEEDS PROSTATE; INSERT RADIATION SEEDS PROSTATE  - Paladium Seeds  110 seeds inserted; Surgeon:JONATHAN BAY; Location: [facility]     MOHS MICROGRAPHIC PROCEDURE      SCC face     ORTHOPEDIC SURGERY      LEFT ROTATOR CUFF REPAIR     REPAIR ESOPHAGEAL STRICTURE      ?clip in esophagus       FAMILY HISTORY:  Family History   Problem Relation Age of Onset     Heart Murmur Father 50        mitral valve disease-Rheumatic valve disease     Alzheimer Disease Mother 95     Bipolar Disorder Sister      Prostate Cancer Brother      Skin Cancer No family hx of        SOCIAL HISTORY:  Social History     Socioeconomic History     Marital status:      Spouse name: None     Number of children: None     Years of education: None     Highest education level: None   Occupational History     Occupation: retired     Comment: prior medical device exec   Social Needs     Financial resource strain: None     Food insecurity:     Worry: None     Inability: None     Transportation needs:     Medical: None     Non-medical: None   Tobacco Use     Smoking status: Never Smoker     Smokeless tobacco: Never Used   Substance and Sexual Activity     Alcohol use: Yes     Comment: 1 beer nightly & a few on the weekends      Drug use: No     Sexual activity: None   Lifestyle     Physical activity:     Days per week: None     Minutes per session: None     Stress: None   Relationships     Social connections:     Talks on phone: None     Gets together: None     Attends Muslim service: None     Active member of club or organization: None     Attends meetings of clubs or organizations: None     Relationship status: None     Intimate partner violence:     Fear of current or ex partner: None     Emotionally abused: None     Physically abused: None     Forced sexual activity: None   Other Topics Concern     Parent/sibling w/ CABG, MI or angioplasty before 65F 55M? Not Asked  "     Service Not Asked     Blood Transfusions Not Asked     Caffeine Concern Not Asked     Occupational Exposure Not Asked     Hobby Hazards Not Asked     Sleep Concern Not Asked     Stress Concern Not Asked     Weight Concern Not Asked     Special Diet No     Back Care Not Asked     Exercise No     Bike Helmet Not Asked     Seat Belt Not Asked     Self-Exams Not Asked   Social History Narrative     None       Review of Systems:  Skin:  Negative     Eyes:  Negative    ENT:  Negative    Respiratory:  Negative    Cardiovascular:  Negative;Negative for;lightheadedness Positive for  Gastroenterology: Negative    Genitourinary:  Positive for prostate problem  Musculoskeletal:  Positive for arthritis  Neurologic:  Negative stroke  Psychiatric:  Negative    Heme/Lymph/Imm:  Negative    Endocrine:  Negative      Physical Exam:  Vitals: /87   Pulse 56   Ht 1.803 m (5' 11\")   Wt 89.4 kg (197 lb)   BMI 27.48 kg/m      Constitutional:  cooperative, alert and oriented, well developed, well nourished, in no acute distress        Skin:  warm and dry to the touch, no apparent skin lesions or masses noted          Head:  normocephalic, no masses or lesions        Eyes:  pupils equal and round, conjunctivae and lids unremarkable, sclera white, no xanthalasma, EOMS intact, no nystagmus        Lymph:No Cervical lymphadenopathy present;No thyromegaly     ENT:  no pallor or cyanosis, dentition good        Neck:  carotid pulses are full and equal bilaterally, JVP normal, no carotid bruit   transmission of AVR sounds    Respiratory:  normal breath sounds, clear to auscultation, normal A-P diameter, normal symmetry, normal respiratory excursion, no use of accessory muscles         Cardiac: regular rhythm frequent premature beats   crisp prosthetic valve sounds systolic ejection murmur;grade 1        pulses full and equal, no bruits auscultated                                        GI:  abdomen soft, non-tender, BS " normoactive, no mass, no HSM, no bruits        Extremities and Muscular Skeletal:  no deformities, clubbing, cyanosis, erythema observed;no edema              Neurological:  no gross motor deficits        Psych:  Alert and Oriented x 3      Recent Lab Results:  LIPID RESULTS:  Lab Results   Component Value Date    CHOL 171 01/13/2020    HDL 53 01/13/2020    LDL 91 01/13/2020    TRIG 133 01/13/2020    CHOLHDLRATIO 2.6 08/09/2012       LIVER ENZYME RESULTS:  Lab Results   Component Value Date    AST 20 01/13/2020    ALT 19 01/13/2020       CBC RESULTS:  Lab Results   Component Value Date    WBC 4.8 11/16/2017    RBC 4.43 11/16/2017    HGB 13.6 11/16/2017    HCT 42.6 11/16/2017    MCV 96.3 11/16/2017    MCH 30.6 11/16/2017    MCHC 31.8 11/16/2017    RDW 15.8 11/16/2017     11/16/2017       BMP RESULTS:  Lab Results   Component Value Date     01/13/2020    POTASSIUM 4.2 01/13/2020    CHLORIDE 101 01/13/2020    CO2 26 01/13/2020    ANIONGAP 9.9 03/21/2018    GLC 88 01/13/2020    BUN 16 01/13/2020    CR 0.92 01/13/2020    GFRESTIMATED 83 01/13/2020    GFRESTBLACK 96 01/13/2020    SACHA 8.8 01/13/2020        A1C RESULTS:  No results found for: A1C    INR RESULTS:  Lab Results   Component Value Date    INR 2.31 (H) 08/08/2015    INR 2.59 (H) 06/23/2012           CC  [facility]  [address]  [place] [address]

## 2020-01-29 NOTE — LETTER
2020      Regan Maurice MD  [facility] [address]  [facility] [address]      RE: Francisco Williamson       Dear Colleague,    I had the pleasure of seeing Francisco Williamson in the [facility].    Service Date: 2020      Regan Maurice MD   [facility]     [address] [place] [address]      RE: Francisco Williamson    MRN: [id number]   : 1950      Dear Dr. Maurice:       I had the pleasure of following up on our mutual patient Francisco Williamson.  As you know, he is a delightful, 69-year-old gentleman.  Back in , he had a conduit [place] aortic valve replacement with an ascending conduit and reimplantation of the coronary arteries.  He had minimal coronary artery disease at the time.  He has done well over the interim.  At one point, we thought there was some valve dysfunction with high gradients across the aortic valve.  We did follow up, including a fluoroscopy, and it was normal, and then followup echo showed a mean gradient across the aortic valve at 20 mmHg.  As you know, the patient a couple years ago had a fall in [place] and had a subdural bleed.  He is obviously on Coumadin because of the mechanical valve.  [place] where he had rib fractures, but no other untoward effects.  He does not need any surgical procedure as opposed to when he had the head bleed, and he had a bur hole.  Echocardiogram was done through [facility], and I have the report, but not the actual films.  By report, the LV shows mild enlargement at 59 mm.  There is again mild LVH.  The ejection fraction is normal.  The left atrium is severely enlarged, which we saw previously.  The right heart is normal.  We had a previous echo suggesting moderate MR.  The more recent echo reports only mild MR and again a 20 mm aortic valve gradient, which although slightly on the high end is completely  stable.  We know his aorta is mildly dilated to approximately 45 mm.  We have been doing periodic CAT scans of that.  The patient reports he just had another CAT scan recently to follow up on a lung nodule.  He told me that they did not find any issue with the lung nodule, but we do not know if the aorta was imaged at that point.        My plan is to repeat his echocardiogram and his CAT scan of his aorta in  to make sure that there is again no change in heart function and also to make sure the aorta is not expanding further past the graft.  The patient reports no chest pain problems.  Regarding the falls, he does admit to a balance issue.  On an extreme case, we could reoperate and put in a porcine valve at this point, and then he would not need to be on Coumadin long term.  Again, I bring this up because this is his second significant fall with injury in 2 years.  Lab tests from your office are reviewed, including lipids and electrolytes, and they are all quite reasonable.  At this point, then, I have made no changes.  I will see him back next year for a regular visit, and we will plan in  a repeat echo and CAT scan of the aorta, and I will ask if you could kindly forward electrolytes and lipids next year.      Today's visit was 30 minutes, greater than 50% counseling.      Sincerely,      MD CRISSY Long MD             D: 2020   T: 2020   MT: salena      Name:     DEN HARKINS   MRN:      [id number]        Account:      [id number]   :      1950           Service Date: 2020      Document: [id number]         Outpatient Encounter Medications as of 2020   Medication Sig Dispense Refill     amLODIPine (NORVASC) 10 MG tablet Take 10 mg by mouth daily       atorvastatin (LIPITOR) 80 MG tablet Take 80 mg by mouth daily       benazepril (LOTENSIN) 10 MG tablet Take 1 tablet (10 mg) by mouth daily 90 tablet 2     cholecalciferol (VITAMIN D) 1000 UNIT  tablet Take 2 tablets by mouth daily. 100 tablet prn     co-enzyme Q-10 100 MG CAPS Take 1 capsule by mouth daily. 90 capsule prn     IRON PO Take by mouth daily       loratadine (CLARITIN) 10 MG tablet Take 10 mg by mouth daily       melatonin 3 MG tablet Take 3 mg by mouth nightly as needed for sleep       metoprolol succinate ER (TOPROL-XL) 100 MG 24 hr tablet Take 100 mg by mouth       metoprolol tartrate (LOPRESSOR) 50 MG tablet Take 0.5 tablets (25 mg) by mouth 2 times daily 1 tablet 0     OMEPRAZOLE PO Take 40 mg by mouth 2 times daily.         senna-docusate (SENNA S) 8.6-50 MG tablet Take 1 tablet by mouth 2 times daily as needed for constipation       terazosin (HYTRIN) 5 MG capsule Take 5 mg by mouth At Bedtime       Warfarin Sodium (COUMADIN PO) Take 7 mg by mouth every other day        [DISCONTINUED] ranitidine (RANITIDINE 150 MAX STRENGTH) 150 MG tablet Take 150 mg by mouth 2 times daily       [DISCONTINUED] TAMSULOSIN HCL PO        No facility-administered encounter medications on file as of 1/29/2020.        Again, thank you for allowing me to participate in the care of your patient.      Sincerely,    Jeff Pedro MD     [facility]

## 2020-01-29 NOTE — LETTER
1/29/2020    Regan Maurice MD  [facility] [address]  [place] [address]    RE: Francisco Williamson       Dear Colleague,    I had the pleasure of seeing Francisco Williamson in the [facility].    HPI and Plan:   See dictation    No orders of the defined types were placed in this encounter.    Orders Placed This Encounter   Medications     metoprolol succinate ER (TOPROL-XL) 100 MG 24 hr tablet     Sig: Take 100 mg by mouth     Medications Discontinued During This Encounter   Medication Reason     ranitidine (RANITIDINE 150 MAX STRENGTH) 150 MG tablet Stopped by Patient     TAMSULOSIN HCL PO Stopped by Patient         No diagnosis found.    CURRENT MEDICATIONS:  Current Outpatient Medications   Medication Sig Dispense Refill     amLODIPine (NORVASC) 10 MG tablet Take 10 mg by mouth daily       atorvastatin (LIPITOR) 80 MG tablet Take 80 mg by mouth daily       benazepril (LOTENSIN) 10 MG tablet Take 1 tablet (10 mg) by mouth daily 90 tablet 2     cholecalciferol (VITAMIN D) 1000 UNIT tablet Take 2 tablets by mouth daily. 100 tablet prn     co-enzyme Q-10 100 MG CAPS Take 1 capsule by mouth daily. 90 capsule prn     IRON PO Take by mouth daily       loratadine (CLARITIN) 10 MG tablet Take 10 mg by mouth daily       melatonin 3 MG tablet Take 3 mg by mouth nightly as needed for sleep       metoprolol succinate ER (TOPROL-XL) 100 MG 24 hr tablet Take 100 mg by mouth       metoprolol tartrate (LOPRESSOR) 50 MG tablet Take 0.5 tablets (25 mg) by mouth 2 times daily 1 tablet 0     OMEPRAZOLE PO Take 40 mg by mouth 2 times daily.         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     Cystic medial necrosis (H)     fibrillin test negative for marfan's     Embolic stroke (H)     L eye -when INR low, another CVA when given Vit K before prostate surgery     First degree heart block     and RBBB     Gastro-oesophageal reflux disease      Gastrointestinal bleeding, upper     EGD - GASTRIC ULCER, EROSIVE ESOPHAGITIS, NON BLEEDING ESOPHAGEAL ULCERS, NON BLEEDING GASTRIC ULCER, LILLIE REYNA TEAR     Hyperlipidemia      Hypertension      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)     Intervertebral disk disease     cervicle and lumbar     Intervertebral disk disease     L4/5 disk herniation     Laceration     L wrist with complete severing of radial artery     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 Subdural hematoma was diagnosed on 4/20/2018 after acute neurologic decompensation, and patient was managed with right sided parietal craniectomy and frontal logan hole in [place].Transferred to [facility] 4/27 after he was stabilized. in [place]       PAST SURGICAL HISTORY:  Past Surgical History:   Procedure Laterality Date     AORTIC VALVE REPLACEMENT  1995    [facility] 27mm 27-[id number]-404 serial# [id number]: aortic valve replacement with composite graft with reimplanation of  the coronary arteries into the graft     COLONOSCOPY       craineotomy  04/2018     closed head injury while vacationing in [place] 4/27/2018 and required emergent right subdural hematoma evacuation     H CATARACT SURGICAL PACKAGE Bilateral 2020    bilat     INSERT RADIATION SEEDS PROSTATE  3/15/2012    Procedure:INSERT RADIATION SEEDS PROSTATE; INSERT RADIATION SEEDS PROSTATE  - Paladium Seeds  110 seeds inserted; Surgeon:JONATHAN BAY; Location:[facility]     MOHS MICROGRAPHIC PROCEDURE      SCC face     ORTHOPEDIC SURGERY      LEFT ROTATOR CUFF REPAIR     REPAIR ESOPHAGEAL STRICTURE      ?clip in esophagus       FAMILY HISTORY:  Family History   Problem Relation Age of Onset     Heart Murmur Father 50        mitral valve disease-Rheumatic valve disease     Alzheimer Disease Mother 95     Bipolar Disorder Sister      Prostate Cancer Brother      Skin Cancer No family hx of        SOCIAL HISTORY:  Social History     Socioeconomic History     Marital status:      Spouse name: None     Number of children: None     Years of education: None     Highest education level: None   Occupational History     Occupation: retired     Comment: prior medical device exec   Social Needs     Financial resource strain: None     Food insecurity:     Worry: None     Inability: None     Transportation needs:     Medical: None     Non-medical: None   Tobacco Use     Smoking status: Never Smoker     Smokeless tobacco: Never Used   Substance and Sexual Activity     Alcohol use: Yes     Comment: 1 beer nightly & a few on the weekends      Drug use: No     Sexual activity: None   Lifestyle     Physical activity:     Days per week: None     Minutes per session: None     Stress: None   Relationships     Social connections:     Talks on phone: None     Gets together: None     Attends Latter-day service: None     Active member of club or organization: None     Attends meetings of clubs or organizations: None     Relationship status: None      "Intimate partner violence:     Fear of current or ex partner: None     Emotionally abused: None     Physically abused: None     Forced sexual activity: None   Other Topics Concern     Parent/sibling w/ CABG, MI or angioplasty before 65F 55M? Not Asked      Service Not Asked     Blood Transfusions Not Asked     Caffeine Concern Not Asked     Occupational Exposure Not Asked     Hobby Hazards Not Asked     Sleep Concern Not Asked     Stress Concern Not Asked     Weight Concern Not Asked     Special Diet No     Back Care Not Asked     Exercise No     Bike Helmet Not Asked     Seat Belt Not Asked     Self-Exams Not Asked   Social History Narrative     None       Review of Systems:  Skin:  Negative     Eyes:  Negative    ENT:  Negative    Respiratory:  Negative    Cardiovascular:  Negative;Negative for;lightheadedness Positive for  Gastroenterology: Negative    Genitourinary:  Positive for prostate problem  Musculoskeletal:  Positive for arthritis  Neurologic:  Negative stroke  Psychiatric:  Negative    Heme/Lymph/Imm:  Negative    Endocrine:  Negative      Physical Exam:  Vitals: /87   Pulse 56   Ht 1.803 m (5' 11\")   Wt 89.4 kg (197 lb)   BMI 27.48 kg/m       Constitutional:  cooperative, alert and oriented, well developed, well nourished, in no acute distress        Skin:  warm and dry to the touch, no apparent skin lesions or masses noted          Head:  normocephalic, no masses or lesions        Eyes:  pupils equal and round, conjunctivae and lids unremarkable, sclera white, no xanthalasma, EOMS intact, no nystagmus        Lymph:No Cervical lymphadenopathy present;No thyromegaly     ENT:  no pallor or cyanosis, dentition good        Neck:  carotid pulses are full and equal bilaterally, JVP normal, no carotid bruit   transmission of AVR sounds    Respiratory:  normal breath sounds, clear to auscultation, normal A-P diameter, normal symmetry, normal respiratory excursion, no use of accessory muscles   "       Cardiac: regular rhythm frequent premature beats   crisp prosthetic valve sounds systolic ejection murmur;grade 1        pulses full and equal, no bruits auscultated                                        GI:  abdomen soft, non-tender, BS normoactive, no mass, no HSM, no bruits        Extremities and Muscular Skeletal:  no deformities, clubbing, cyanosis, erythema observed;no edema              Neurological:  no gross motor deficits        Psych:  Alert and Oriented x 3      Recent Lab Results:  LIPID RESULTS:  Lab Results   Component Value Date    CHOL 171 01/13/2020    HDL 53 01/13/2020    LDL 91 01/13/2020    TRIG 133 01/13/2020    CHOLHDLRATIO 2.6 08/09/2012       LIVER ENZYME RESULTS:  Lab Results   Component Value Date    AST 20 01/13/2020    ALT 19 01/13/2020       CBC RESULTS:  Lab Results   Component Value Date    WBC 4.8 11/16/2017    RBC 4.43 11/16/2017    HGB 13.6 11/16/2017    HCT 42.6 11/16/2017    MCV 96.3 11/16/2017    MCH 30.6 11/16/2017    MCHC 31.8 11/16/2017    RDW 15.8 11/16/2017     11/16/2017       BMP RESULTS:  Lab Results   Component Value Date     01/13/2020    POTASSIUM 4.2 01/13/2020    CHLORIDE 101 01/13/2020    CO2 26 01/13/2020    ANIONGAP 9.9 03/21/2018    GLC 88 01/13/2020    BUN 16 01/13/2020    CR 0.92 01/13/2020    GFRESTIMATED 83 01/13/2020    GFRESTBLACK 96 01/13/2020    SACHA 8.8 01/13/2020        A1C RESULTS:  No results found for: A1C    INR RESULTS:  Lab Results   Component Value Date    INR 2.31 (H) 08/08/2015    INR 2.59 (H) 06/23/2012           CC  Regan Maurice MD  [facility]  [address]  [place] [phone]    Thank you for allowing me to participate in the care of your patient.      Sincerely,     Jeff Pedro MD     [facility]    cc:   [facility]  [address]  [place] [phone]

## 2020-02-24 DIAGNOSIS — I10 BENIGN ESSENTIAL HYPERTENSION: ICD-10-CM

## 2020-02-24 RX ORDER — BENAZEPRIL HYDROCHLORIDE 10 MG/1
10 TABLET ORAL DAILY
Qty: 90 TABLET | Refills: 3 | Status: SHIPPED | OUTPATIENT
Start: 2020-02-24 | End: 2021-02-09

## 2020-03-22 ENCOUNTER — HEALTH MAINTENANCE LETTER (OUTPATIENT)
Age: 70
End: 2020-03-22

## 2021-01-15 ENCOUNTER — HEALTH MAINTENANCE LETTER (OUTPATIENT)
Age: 71
End: 2021-01-15

## 2021-02-09 DIAGNOSIS — I10 BENIGN ESSENTIAL HYPERTENSION: ICD-10-CM

## 2021-02-09 RX ORDER — BENAZEPRIL HYDROCHLORIDE 10 MG/1
10 TABLET ORAL DAILY
Qty: 90 TABLET | Refills: 0 | Status: SHIPPED | OUTPATIENT
Start: 2021-02-09 | End: 2021-03-09

## 2021-02-16 ENCOUNTER — CARE COORDINATION (OUTPATIENT)
Dept: CARDIOLOGY | Facility: CLINIC | Age: 71
End: 2021-02-16

## 2021-02-16 NOTE — PROGRESS NOTES
"Received voicemail from pt who reports he has a visit with Dr. Pedro scheduled on 3/1/21 and requested to know if he needs any labs or test done prior to that visit.     Per chart review, pt's most recent visit with cardiology was with Dr. Pedro on 1/29/20 and that note states:  \" I will see him back next year for a regular visit, and we will plan in 2022 a repeat echo and CAT scan of the aorta\"    Called pt, no answer, left voicemail reviewing above information and that Dr. Pedro did not order any labs or test to be done prior to his visit this year. Left Team 6 callback number for further questions/concerns.     JUAN Stone 3:10 PM 2/16/2021    "

## 2021-03-01 ENCOUNTER — OFFICE VISIT (OUTPATIENT)
Dept: CARDIOLOGY | Facility: CLINIC | Age: 71
End: 2021-03-01
Payer: COMMERCIAL

## 2021-03-01 VITALS
BODY MASS INDEX: 27.16 KG/M2 | HEIGHT: 71 IN | DIASTOLIC BLOOD PRESSURE: 106 MMHG | SYSTOLIC BLOOD PRESSURE: 169 MMHG | HEART RATE: 58 BPM | WEIGHT: 194 LBS

## 2021-03-01 DIAGNOSIS — I71.21 ANEURYSM OF ASCENDING AORTA (H): ICD-10-CM

## 2021-03-01 DIAGNOSIS — I35.9 AORTIC VALVE DISORDER: Primary | ICD-10-CM

## 2021-03-01 DIAGNOSIS — I10 BENIGN ESSENTIAL HYPERTENSION: ICD-10-CM

## 2021-03-01 PROCEDURE — 99213 OFFICE O/P EST LOW 20 MIN: CPT | Performed by: INTERNAL MEDICINE

## 2021-03-01 RX ORDER — INDAPAMIDE 1.25 MG/1
1.25 TABLET ORAL EVERY MORNING
Qty: 30 TABLET | Refills: 3 | Status: SHIPPED | OUTPATIENT
Start: 2021-03-01 | End: 2021-03-09 | Stop reason: SINTOL

## 2021-03-01 SDOH — HEALTH STABILITY: MENTAL HEALTH: HOW OFTEN DO YOU HAVE A DRINK CONTAINING ALCOHOL?: NOT ASKED

## 2021-03-01 SDOH — HEALTH STABILITY: MENTAL HEALTH: HOW OFTEN DO YOU HAVE 6 OR MORE DRINKS ON ONE OCCASION?: NOT ASKED

## 2021-03-01 SDOH — HEALTH STABILITY: MENTAL HEALTH: HOW MANY STANDARD DRINKS CONTAINING ALCOHOL DO YOU HAVE ON A TYPICAL DAY?: NOT ASKED

## 2021-03-01 ASSESSMENT — MIFFLIN-ST. JEOR: SCORE: 1652.11

## 2021-03-01 NOTE — PROGRESS NOTES
HPI and Plan:   See dictation    Orders Placed This Encounter   Procedures     Basic metabolic panel     Follow-Up with Cardiac Advanced Practice Provider     Orders Placed This Encounter   Medications     indapamide (LOZOL) 1.25 MG tablet     Sig: Take 1 tablet (1.25 mg) by mouth every morning     Dispense:  30 tablet     Refill:  3     Medications Discontinued During This Encounter   Medication Reason     metoprolol tartrate (LOPRESSOR) 50 MG tablet          Encounter Diagnosis   Name Primary?     Benign essential hypertension        CURRENT MEDICATIONS:  Current Outpatient Medications   Medication Sig Dispense Refill     amLODIPine (NORVASC) 10 MG tablet Take 10 mg by mouth daily       atorvastatin (LIPITOR) 80 MG tablet Take 80 mg by mouth daily       benazepril (LOTENSIN) 10 MG tablet Take 1 tablet (10 mg) by mouth daily Needs an appointment for refills. 90 tablet 0     cholecalciferol (VITAMIN D) 1000 UNIT tablet Take 2 tablets by mouth daily. 100 tablet prn     co-enzyme Q-10 100 MG CAPS Take 1 capsule by mouth daily. 90 capsule prn     indapamide (LOZOL) 1.25 MG tablet Take 1 tablet (1.25 mg) by mouth every morning 30 tablet 3     IRON PO Take by mouth daily       loratadine (CLARITIN) 10 MG tablet Take 10 mg by mouth daily       metoprolol succinate ER (TOPROL-XL) 100 MG 24 hr tablet Take 100 mg by mouth       OMEPRAZOLE PO Take 40 mg by mouth 2 times daily.         senna-docusate (SENNA S) 8.6-50 MG tablet Take 1 tablet by mouth 2 times daily as needed for constipation       terazosin (HYTRIN) 5 MG capsule Take 5 mg by mouth At Bedtime       Warfarin Sodium (COUMADIN PO) Take 7 mg by mouth every other day        melatonin 3 MG tablet Take 3 mg by mouth nightly as needed for sleep         ALLERGIES     Allergies   Allergen Reactions     Morphine Sulfate        PAST MEDICAL HISTORY:  Past Medical History:   Diagnosis Date     Aortic valve disease     ascending aortic aneurysm , AVR 1995     CAD (coronary  artery disease)     minimal on CT cor angiogram     Colitis     radiation colitis, 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     Cystic medial necrosis (H)     fibrillin test negative for marfan's     Embolic stroke (H)     L eye -when INR low, another CVA when given Vit K before prostate surgery     First degree heart block     and RBBB     Gastro-oesophageal reflux disease      Gastrointestinal bleeding, upper     EGD - GASTRIC ULCER, EROSIVE ESOPHAGITIS, NON BLEEDING ESOPHAGEAL ULCERS, NON BLEEDING GASTRIC ULCER, LILLIE REYNA TEAR     Hyperlipidemia      Hypertension      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)     Intervertebral disk disease     cervicle and lumbar     Intervertebral disk disease     L4/5 disk herniation     Laceration     L wrist with complete severing of radial artery     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 Subdural hematoma was diagnosed on 4/20/2018 after acute neurologic decompensation, and patient was managed with right sided parietal craniectomy and frontal logan hole in [place].Transferred to [facility] 4/27 after he was stabilized. in [place]       PAST SURGICAL HISTORY:  Past Surgical History:   Procedure Laterality Date     AORTIC VALVE REPLACEMENT  1995    [facility] 27mm 27-[id number]-[id number] serial# [id number]: aortic valve replacement with composite graft with reimplanation of the coronary arteries into the graft     COLONOSCOPY       craineotomy  04/2018     closed head injury while vacationing in [place] 4/27/2018  and required emergent right subdural hematoma evacuation     H CATARACT SURGICAL PACKAGE Bilateral 2020    bilat     INSERT RADIATION SEEDS PROSTATE  3/15/2012    Procedure:INSERT RADIATION SEEDS PROSTATE; INSERT RADIATION SEEDS PROSTATE  - Paladium Seeds  110 seeds inserted; Surgeon:JONATHAN BAY; Location:[facility]     MOHS MICROGRAPHIC PROCEDURE      SCC face     ORTHOPEDIC SURGERY      LEFT ROTATOR CUFF REPAIR     REPAIR ESOPHAGEAL STRICTURE      ?clip in esophagus       FAMILY HISTORY:  Family History   Problem Relation Age of Onset     Heart Murmur Father 50        mitral valve disease-Rheumatic valve disease     Alzheimer Disease Mother 95     Bipolar Disorder Sister      Prostate Cancer Brother      Skin Cancer No family hx of        SOCIAL HISTORY:  Social History     Socioeconomic History     Marital status:      Spouse name: None     Number of children: None     Years of education: None     Highest education level: None   Occupational History     Occupation: retired     Comment: prior medical device exec   Social Needs     Financial resource strain: None     Food insecurity     Worry: None     Inability: None     Transportation needs     Medical: None     Non-medical: None   Tobacco Use     Smoking status: Never Smoker     Smokeless tobacco: Never Used   Substance and Sexual Activity     Alcohol use: Yes     Comment: 2-3 beer/d     Drug use: No     Sexual activity: None   Lifestyle     Physical activity     Days per week: None     Minutes per session: None     Stress: None   Relationships     Social connections     Talks on phone: None     Gets together: None     Attends Spiritism service: None     Active member of club or organization: None     Attends meetings of clubs or organizations: None     Relationship status: None     Intimate partner violence     Fear of current or ex partner: None     Emotionally abused: None     Physically abused: None     Forced sexual activity: None   Other Topics  "Concern     Parent/sibling w/ CABG, MI or angioplasty before 65F 55M? Not Asked      Service Not Asked     Blood Transfusions Not Asked     Caffeine Concern Not Asked     Occupational Exposure Not Asked     Hobby Hazards Not Asked     Sleep Concern Not Asked     Stress Concern Not Asked     Weight Concern Not Asked     Special Diet No     Back Care Not Asked     Exercise No     Bike Helmet Not Asked     Seat Belt Not Asked     Self-Exams Not Asked   Social History Narrative     None       Review of Systems:  Skin:  Negative     Eyes:  Negative    ENT:  Negative    Respiratory:  Negative    Cardiovascular:  Negative;Negative for;lightheadedness Positive for  Gastroenterology: Negative    Genitourinary:  Positive for prostate problem  Musculoskeletal:  Positive for arthritis  Neurologic:  Negative stroke  Psychiatric:  Negative    Heme/Lymph/Imm:  Negative    Endocrine:  Negative      Physical Exam:  Vitals: BP (!) 169/106   Pulse 58   Ht 1.803 m (5' 11\")   Wt 88 kg (194 lb)   BMI 27.06 kg/m      Constitutional:  cooperative, alert and oriented, well developed, well nourished, in no acute distress        Skin:  warm and dry to the touch, no apparent skin lesions or masses noted          Head:  normocephalic, no masses or lesions        Eyes:  pupils equal and round, conjunctivae and lids unremarkable, sclera white, no xanthalasma, EOMS intact, no nystagmus        Lymph:No Cervical lymphadenopathy present;No thyromegaly     ENT:  no pallor or cyanosis, dentition good        Neck:  carotid pulses are full and equal bilaterally, JVP normal, no carotid bruit        Respiratory:  normal breath sounds, clear to auscultation, normal A-P diameter, normal symmetry, normal respiratory excursion, no use of accessory muscles         Cardiac: regular rhythm occasional premature beats   crisp prosthetic valve sounds systolic ejection murmur;grade 1        pulses full and equal, no bruits auscultated                      "                   GI:  abdomen soft, non-tender, BS normoactive, no mass, no HSM, no bruits        Extremities and Muscular Skeletal:  no deformities, clubbing, cyanosis, erythema observed;no edema              Neurological:  no gross motor deficits        Psych:  Alert and Oriented x 3      Recent Lab Results:  LIPID RESULTS:  Lab Results   Component Value Date    CHOL 171 01/13/2020    HDL 53 01/13/2020    LDL 91 01/13/2020    TRIG 133 01/13/2020    CHOLHDLRATIO 2.6 08/09/2012       LIVER ENZYME RESULTS:  Lab Results   Component Value Date    AST 20 01/13/2020    ALT 19 01/13/2020       CBC RESULTS:  Lab Results   Component Value Date    WBC 4.8 11/16/2017    RBC 4.43 11/16/2017    HGB 13.6 11/16/2017    HCT 42.6 11/16/2017    MCV 96.3 11/16/2017    MCH 30.6 11/16/2017    MCHC 31.8 11/16/2017    RDW 15.8 11/16/2017     11/16/2017       BMP RESULTS:  Lab Results   Component Value Date     01/13/2020    POTASSIUM 4.2 01/13/2020    CHLORIDE 101 01/13/2020    CO2 26 01/13/2020    ANIONGAP 9.9 03/21/2018    GLC 88 01/13/2020    BUN 16 01/13/2020    CR 0.92 01/13/2020    GFRESTIMATED 83 01/13/2020    GFRESTBLACK 96 01/13/2020    SACHA 8.8 01/13/2020        A1C RESULTS:  No results found for: A1C    INR RESULTS:  Lab Results   Component Value Date    INR 2.31 (H) 08/08/2015    INR 2.59 (H) 06/23/2012           CC  No referring provider defined for this encounter.

## 2021-03-01 NOTE — PROGRESS NOTES
Service Date: 03/01/2021      Francisco Williamson returns for followup.  He is a delightful 70-year-old gentleman who has been a patient of ours for years.  In 1995, he had a [place] aortic valve with aortic conduit replacement and reimplantation of the coronary arteries.  He had minimal coronary disease at that time.  He has done well in the interim.        His gradients across the aortic valve are at the upper limits of normal.  The running a mean gradient 20.  We actually did valve fluoroscopy and it was normal.        He had a couple of falls in the past, one was in [place].  He has subdural bleeds since he is obviously on Coumadin with his mechanical valve.  He had another fall about a year or 2 ago, but he has had no problem this past year.  He states he has been doing well.  No chest pain, shortness of breath.  No falls.        He came in for his routine visit today.  His blood pressure was quite high.  We checked it; both the nurse practitioner and I checked it.  I got 179/91 in the right arm and 178/100 in the left arm.  The patient states he has a home blood pressure machine and numbers are much lower than that.  I did double check with him that he indeed is taking his medications as prescribed and I went through each of the medicines with him.        Of note, I was concerned about alcohol and he does mention 2-3 beers per day.  I asked him if he could cut that down to no more than 1-2, since that may contribute.        My plan is I am going to start him on indapamide 1.25 mg a day.  He will continue his other blood pressure pills and I am going to have him bring his blood pressure machine.  He will check his home blood pressure numbers over the next 2 weeks.  He will bring his machine and we will check an electrolyte panel when he comes in.  If his numbers are still high, we can increase the benazepril to 20 or 40 mg.  We might even consider switching the indapamide to Aldactone if his blood pressure numbers  are indeed still high, but that would require careful monitoring of his potassium and electrolytes and creatinine.  If we do add Aldactone, I would not go to a maximum dose of benazepril.        My plan is for , that we will do an echocardiogram and a CAT scan of his aorta since he did have aortic dilatation previously.      Today's visit was 30 minutes, greater than 50% counseling.  We reviewed his previous echo with a mechanical valve.  The mitral valve had somewhere between mild and moderate leakage.  The aorta was mildly dilated.  We talked about alcohol issues.  We talked about blood pressure management and our followup plan.      cc:      Regan Maurice MD    [facility]   [address]   [place] [address]         CRISSY ERNST MD             D: 2021   T: 2021   MT: SANDEEP      Name:     DEN HARKINS   MRN:      [id number]        Account:      [id number]   :      1950           Service Date: 2021      Document: [id number]

## 2021-03-01 NOTE — LETTER
3/1/2021    Regan Maurice MD  [facility] [address]  [facility] [id number]    RE: Francisco Williamson       Dear Colleague,    I had the pleasure of seeing Francisco Williamson in the [facility].    HPI and Plan:   See dictation    Orders Placed This Encounter   Procedures     Basic metabolic panel     Follow-Up with Cardiac Advanced Practice Provider     Orders Placed This Encounter   Medications     indapamide (LOZOL) 1.25 MG tablet     Sig: Take 1 tablet (1.25 mg) by mouth every morning     Dispense:  30 tablet     Refill:  3     Medications Discontinued During This Encounter   Medication Reason     metoprolol tartrate (LOPRESSOR) 50 MG tablet          Encounter Diagnosis   Name Primary?     Benign essential hypertension        CURRENT MEDICATIONS:  Current Outpatient Medications   Medication Sig Dispense Refill     amLODIPine (NORVASC) 10 MG tablet Take 10 mg by mouth daily       atorvastatin (LIPITOR) 80 MG tablet Take 80 mg by mouth daily       benazepril (LOTENSIN) 10 MG tablet Take 1 tablet (10 mg) by mouth daily Needs an appointment for refills. 90 tablet 0     cholecalciferol (VITAMIN D) 1000 UNIT tablet Take 2 tablets by mouth daily. 100 tablet prn     co-enzyme Q-10 100 MG CAPS Take 1 capsule by mouth daily. 90 capsule prn     indapamide (LOZOL) 1.25 MG tablet Take 1 tablet (1.25 mg) by mouth every morning 30 tablet 3     IRON PO Take by mouth daily       loratadine (CLARITIN) 10 MG tablet Take 10 mg by mouth daily       metoprolol succinate ER (TOPROL-XL) 100 MG 24 hr tablet Take 100 mg by mouth       OMEPRAZOLE PO Take 40 mg by mouth 2 times daily.         senna-docusate (SENNA S) 8.6-50 MG tablet Take 1 tablet by mouth 2 times daily as needed for constipation       terazosin (HYTRIN) 5 MG capsule Take 5 mg by mouth At Bedtime       Warfarin Sodium (COUMADIN PO) Take 7 mg by mouth every other day        melatonin 3 MG tablet  Take 3 mg by mouth nightly as needed for sleep         ALLERGIES     Allergies   Allergen Reactions     Morphine Sulfate        PAST MEDICAL HISTORY:  Past Medical History:   Diagnosis Date     Aortic valve disease     ascending aortic aneurysm , AVR 1995     CAD (coronary artery disease)     minimal on CT cor angiogram     Colitis     radiation colitis, 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     Cystic medial necrosis (H)     fibrillin test negative for marfan's     Embolic stroke (H)     L eye -when INR low, another CVA when given Vit K before prostate surgery     First degree heart block     and RBBB     Gastro-oesophageal reflux disease      Gastrointestinal bleeding, upper     EGD - GASTRIC ULCER, EROSIVE ESOPHAGITIS, NON BLEEDING ESOPHAGEAL ULCERS, NON BLEEDING GASTRIC ULCER, LILLIE REYNA TEAR     Hyperlipidemia      Hypertension      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)     Intervertebral disk disease     cervicle and lumbar     Intervertebral disk disease     L4/5 disk herniation     Laceration     L wrist with complete severing of radial artery     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 Subdural hematoma was diagnosed on 4/20/2018 after acute neurologic decompensation, and patient was managed with right sided parietal craniectomy and frontal logan hole in [place].Transferred to [facility] 4/27 after he was stabilized. in [place]       PAST SURGICAL HISTORY:  Past Surgical History:   Procedure Laterality Date     AORTIC  VALVE REPLACEMENT  1995    [facility] 27 [id number] serial# [id number]: aortic valve replacement with composite graft with reimplanation of the coronary arteries into the graft     COLONOSCOPY       craineotomy  04/2018     closed head injury while vacationing in [place] 4/27/2018 and required emergent right subdural hematoma evacuation     H CATARACT SURGICAL PACKAGE Bilateral 2020    bilat     INSERT RADIATION SEEDS PROSTATE  3/15/2012    Procedure:INSERT RADIATION SEEDS PROSTATE; INSERT RADIATION SEEDS PROSTATE  - Paladium Seeds  110 seeds inserted; Surgeon:JONATHAN BAY; Location:[facility]     MOHS MICROGRAPHIC PROCEDURE      SCC face     ORTHOPEDIC SURGERY      LEFT ROTATOR CUFF REPAIR     REPAIR ESOPHAGEAL STRICTURE      ?clip in esophagus       FAMILY HISTORY:  Family History   Problem Relation Age of Onset     Heart Murmur Father 50        mitral valve disease-Rheumatic valve disease     Alzheimer Disease Mother 95     Bipolar Disorder Sister      Prostate Cancer Brother      Skin Cancer No family hx of        SOCIAL HISTORY:  Social History     Socioeconomic History     Marital status:      Spouse name: None     Number of children: None     Years of education: None     Highest education level: None   Occupational History     Occupation: retired     Comment: prior medical device exec   Social Needs     Financial resource strain: None     Food insecurity     Worry: None     Inability: None     Transportation needs     Medical: None     Non-medical: None   Tobacco Use     Smoking status: Never Smoker     Smokeless tobacco: Never Used   Substance and Sexual Activity     Alcohol use: Yes     Comment: 2-3 beer/d     Drug use: No     Sexual activity: None   Lifestyle     Physical activity     Days per week: None     Minutes per session: None     Stress: None   Relationships     Social connections     Talks on phone: None     Gets together: None     Attends Taoist service: None     Active member of club or  "organization: None     Attends meetings of clubs or organizations: None     Relationship status: None     Intimate partner violence     Fear of current or ex partner: None     Emotionally abused: None     Physically abused: None     Forced sexual activity: None   Other Topics Concern     Parent/sibling w/ CABG, MI or angioplasty before 65F 55M? Not Asked      Service Not Asked     Blood Transfusions Not Asked     Caffeine Concern Not Asked     Occupational Exposure Not Asked     Hobby Hazards Not Asked     Sleep Concern Not Asked     Stress Concern Not Asked     Weight Concern Not Asked     Special Diet No     Back Care Not Asked     Exercise No     Bike Helmet Not Asked     Seat Belt Not Asked     Self-Exams Not Asked   Social History Narrative     None       Review of Systems:  Skin:  Negative     Eyes:  Negative    ENT:  Negative    Respiratory:  Negative    Cardiovascular:  Negative;Negative for;lightheadedness Positive for  Gastroenterology: Negative    Genitourinary:  Positive for prostate problem  Musculoskeletal:  Positive for arthritis  Neurologic:  Negative stroke  Psychiatric:  Negative    Heme/Lymph/Imm:  Negative    Endocrine:  Negative      Physical Exam:  Vitals: BP (!) 169/106   Pulse 58   Ht 1.803 m (5' 11\")   Wt 88 kg (194 lb)   BMI 27.06 kg/m      Constitutional:  cooperative, alert and oriented, well developed, well nourished, in no acute distress        Skin:  warm and dry to the touch, no apparent skin lesions or masses noted          Head:  normocephalic, no masses or lesions        Eyes:  pupils equal and round, conjunctivae and lids unremarkable, sclera white, no xanthalasma, EOMS intact, no nystagmus        Lymph:No Cervical lymphadenopathy present;No thyromegaly     ENT:  no pallor or cyanosis, dentition good        Neck:  carotid pulses are full and equal bilaterally, JVP normal, no carotid bruit        Respiratory:  normal breath sounds, clear to auscultation, normal A-P " diameter, normal symmetry, normal respiratory excursion, no use of accessory muscles         Cardiac: regular rhythm occasional premature beats   crisp prosthetic valve sounds systolic ejection murmur;grade 1        pulses full and equal, no bruits auscultated                                        GI:  abdomen soft, non-tender, BS normoactive, no mass, no HSM, no bruits        Extremities and Muscular Skeletal:  no deformities, clubbing, cyanosis, erythema observed;no edema              Neurological:  no gross motor deficits        Psych:  Alert and Oriented x 3      Recent Lab Results:  LIPID RESULTS:  Lab Results   Component Value Date    CHOL 171 01/13/2020    HDL 53 01/13/2020    LDL 91 01/13/2020    TRIG 133 01/13/2020    CHOLHDLRATIO 2.6 08/09/2012       LIVER ENZYME RESULTS:  Lab Results   Component Value Date    AST 20 01/13/2020    ALT 19 01/13/2020       CBC RESULTS:  Lab Results   Component Value Date    WBC 4.8 11/16/2017    RBC 4.43 11/16/2017    HGB 13.6 11/16/2017    HCT 42.6 11/16/2017    MCV 96.3 11/16/2017    MCH 30.6 11/16/2017    MCHC 31.8 11/16/2017    RDW 15.8 11/16/2017     11/16/2017       BMP RESULTS:  Lab Results   Component Value Date     01/13/2020    POTASSIUM 4.2 01/13/2020    CHLORIDE 101 01/13/2020    CO2 26 01/13/2020    ANIONGAP 9.9 03/21/2018    GLC 88 01/13/2020    BUN 16 01/13/2020    CR 0.92 01/13/2020    GFRESTIMATED 83 01/13/2020    GFRESTBLACK 96 01/13/2020    SACHA 8.8 01/13/2020        A1C RESULTS:  No results found for: A1C    INR RESULTS:  Lab Results   Component Value Date    INR 2.31 (H) 08/08/2015    INR 2.59 (H) 06/23/2012       Thank you for allowing me to participate in the care of your patient.      Sincerely,     Jeff Pedro MD     [facility]    cc:   No referring provider defined for this encounter.

## 2021-03-03 ENCOUNTER — TRANSFERRED RECORDS (OUTPATIENT)
Dept: HEALTH INFORMATION MANAGEMENT | Facility: CLINIC | Age: 71
End: 2021-03-03

## 2021-03-04 LAB
ALBUMIN SERPL-MCNC: 4.2 G/DL (ref 3.7–4.7)
ALP SERPL-CCNC: 66 U/L (ref 39–117)
ALT SERPL-CCNC: 19 U/L (ref 0–44)
ANION GAP SERPL CALCULATED.3IONS-SCNC: NORMAL MMOL/L
AST SERPL-CCNC: 23 U/L (ref 0–40)
BILIRUB SERPL-MCNC: 0.5 MG/DL (ref 0–1.2)
BUN SERPL-MCNC: 17 MG/DL (ref 8–27)
CALCIUM SERPL-MCNC: 8.9 MG/DL (ref 8.6–10.2)
CHLORIDE SERPLBLD-SCNC: 101 MMOL/L (ref 96–106)
CHOLEST SERPL-MCNC: 171 MG/DL (ref ?–199)
CO2 SERPL-SCNC: 24 MMOL/L (ref 20–29)
CREAT SERPL-MCNC: 1.03 MG/DL (ref 0.76–1.27)
GFR SERPL CREATININE-BSD FRML MDRD: 72 ML/MIN/1.73M2 (ref 59–?)
GLUCOSE SERPL-MCNC: 90 MG/DL (ref 70–99)
HDLC SERPL-MCNC: 64 MG/DL (ref 39–?)
LDLC SERPL CALC-MCNC: 89 MG/DL (ref ?–99)
NONHDLC SERPL-MCNC: NORMAL MG/DL
POTASSIUM SERPL-SCNC: 4.7 MMOL/L (ref 3.5–5.2)
PROT SERPL-MCNC: 6.9 G/DL (ref 6–8.5)
PSA SERPL-MCNC: <0.1 NG/ML (ref 0–4)
SODIUM SERPL-SCNC: 140 MMOL/L (ref 134–144)
TRIGL SERPL-MCNC: 97 MG/DL (ref ?–149)

## 2021-03-09 ENCOUNTER — TELEPHONE (OUTPATIENT)
Dept: CARDIOLOGY | Facility: CLINIC | Age: 71
End: 2021-03-09

## 2021-03-09 DIAGNOSIS — I10 BENIGN ESSENTIAL HYPERTENSION: ICD-10-CM

## 2021-03-09 RX ORDER — BENAZEPRIL HYDROCHLORIDE 10 MG/1
20 TABLET ORAL DAILY
Qty: 90 TABLET | Refills: 0 | Status: SHIPPED | DISCHARGE
Start: 2021-03-09 | End: 2021-04-06

## 2021-03-09 NOTE — TELEPHONE ENCOUNTER
Pt called in he did not tolerate Lozol and had dizziness so stopped med.Pt says BP's still running 150-160 systolic. Discussed Benazepril that DR Pedro recommended in his OV note and Pt prefers to start with lower dose 20 mg a day and will monitor BP's. Pt asked to make appointment for lab and OV to be seen after medication increase in 2 weeks. Informed Pt would message provider for any other recommendations. JENS Barros RN

## 2021-03-09 NOTE — PROGRESS NOTES
Pt called in stating he is having dizziness and is stopping Lozol. Pt said DR Pedro had recommended could make changes to Benazapril. Left message for Pt to return call to discuss. JENS Barros RN

## 2021-03-16 ENCOUNTER — DOCUMENTATION ONLY (OUTPATIENT)
Dept: CARDIOLOGY | Facility: CLINIC | Age: 71
End: 2021-03-16

## 2021-03-24 NOTE — TELEPHONE ENCOUNTER
Did contact Pt he said his BP's are improved. Pt does say when BP gets to low he gets a little dizzy. Did ask Pt what to low is and he said 137/ 79. Pt said he would schedule F/U but has been unable to get through to scheduling. Informed Pt would have scheduling call him.  JENS Barros RN

## 2021-04-06 DIAGNOSIS — I10 BENIGN ESSENTIAL HYPERTENSION: ICD-10-CM

## 2021-04-06 RX ORDER — BENAZEPRIL HYDROCHLORIDE 10 MG/1
20 TABLET ORAL DAILY
Qty: 180 TABLET | Refills: 3 | Status: SHIPPED | OUTPATIENT
Start: 2021-04-06 | End: 2021-04-12

## 2021-04-12 ENCOUNTER — TELEPHONE (OUTPATIENT)
Dept: CARDIOLOGY | Facility: CLINIC | Age: 71
End: 2021-04-12

## 2021-04-12 DIAGNOSIS — I10 BENIGN ESSENTIAL HYPERTENSION: ICD-10-CM

## 2021-04-12 RX ORDER — BENAZEPRIL HYDROCHLORIDE 10 MG/1
20 TABLET ORAL DAILY
Qty: 180 TABLET | Refills: 3 | Status: SHIPPED | OUTPATIENT
Start: 2021-04-12 | End: 2021-07-02

## 2021-04-12 NOTE — TELEPHONE ENCOUNTER
Return Pt call left message. Verified he has labs and appointment 5/4/21 with NP after med adjustments.  Informed Pt he did not need to call back if fine , but if further med adjustments needed please call nurse and let her know. JENS Barros Rn

## 2021-04-22 NOTE — TELEPHONE ENCOUNTER
Return Pt call he said BP's still running high 150 to 160 /90. Informed Pt that DR Pedro in note said he could go up to Benazepril 40 mg, or if he chooses can wait to see NP 5/4/21. Pt said he preferred to wait and see NP at OV. Informed Pt that is fine. JENS Barros RN

## 2021-05-04 ENCOUNTER — MYC MEDICAL ADVICE (OUTPATIENT)
Dept: CARDIOLOGY | Facility: CLINIC | Age: 71
End: 2021-05-04

## 2021-05-04 ENCOUNTER — OFFICE VISIT (OUTPATIENT)
Dept: CARDIOLOGY | Facility: CLINIC | Age: 71
End: 2021-05-04
Attending: INTERNAL MEDICINE
Payer: COMMERCIAL

## 2021-05-04 ENCOUNTER — DOCUMENTATION ONLY (OUTPATIENT)
Dept: CARDIOLOGY | Facility: CLINIC | Age: 71
End: 2021-05-04

## 2021-05-04 VITALS
DIASTOLIC BLOOD PRESSURE: 86 MMHG | WEIGHT: 195.9 LBS | BODY MASS INDEX: 27.43 KG/M2 | HEART RATE: 64 BPM | HEIGHT: 71 IN | SYSTOLIC BLOOD PRESSURE: 146 MMHG

## 2021-05-04 DIAGNOSIS — I10 BENIGN ESSENTIAL HYPERTENSION: ICD-10-CM

## 2021-05-04 DIAGNOSIS — I35.9 AORTIC VALVE DISORDER: ICD-10-CM

## 2021-05-04 DIAGNOSIS — I71.21 ANEURYSM OF ASCENDING AORTA (H): ICD-10-CM

## 2021-05-04 DIAGNOSIS — E78.5 HYPERLIPIDEMIA: Primary | ICD-10-CM

## 2021-05-04 DIAGNOSIS — I10 BENIGN ESSENTIAL HYPERTENSION: Primary | ICD-10-CM

## 2021-05-04 LAB
ANION GAP SERPL CALCULATED.3IONS-SCNC: 3 MMOL/L (ref 3–14)
BUN SERPL-MCNC: 17 MG/DL (ref 7–30)
CALCIUM SERPL-MCNC: 8.4 MG/DL (ref 8.5–10.1)
CHLORIDE SERPL-SCNC: 105 MMOL/L (ref 94–109)
CO2 SERPL-SCNC: 29 MMOL/L (ref 20–32)
CREAT SERPL-MCNC: 1.02 MG/DL (ref 0.66–1.25)
GFR SERPL CREATININE-BSD FRML MDRD: 73 ML/MIN/{1.73_M2}
GLUCOSE SERPL-MCNC: 86 MG/DL (ref 70–99)
POTASSIUM SERPL-SCNC: 4.3 MMOL/L (ref 3.4–5.3)
SODIUM SERPL-SCNC: 137 MMOL/L (ref 133–144)

## 2021-05-04 PROCEDURE — 80048 BASIC METABOLIC PNL TOTAL CA: CPT | Performed by: INTERNAL MEDICINE

## 2021-05-04 PROCEDURE — 99214 OFFICE O/P EST MOD 30 MIN: CPT | Performed by: NURSE PRACTITIONER

## 2021-05-04 PROCEDURE — 36415 COLL VENOUS BLD VENIPUNCTURE: CPT | Performed by: INTERNAL MEDICINE

## 2021-05-04 RX ORDER — SPIRONOLACTONE 25 MG/1
12.5 TABLET ORAL DAILY
Qty: 15 TABLET | Refills: 1 | Status: SHIPPED | OUTPATIENT
Start: 2021-05-04 | End: 2021-07-02

## 2021-05-04 ASSESSMENT — MIFFLIN-ST. JEOR: SCORE: 1660.73

## 2021-05-04 NOTE — PROGRESS NOTES
Called and spoke with pt and discussed that ALESSIO Ge reviewed labs done today and his potassium is down to 4.3 so she does recommend he start aldactone 12.5mg (1/2 of 25mg tablet) daily. Pt verbalized understanding and agrees with this plan. Also reviewed need for lab to be done 1 wk after starting aldactone, 3 weeks after that and then 3-4 weeks after that with a visit with ALESSIO Montano as well. Pt states he needs to look at his schedule and will call scheduling back to arrange all the appts.     [employer] message sent to pt with high potassium foods to decrease in his diet.     JUAN Stone 4:42 PM 5/4/2021

## 2021-05-04 NOTE — PROGRESS NOTES
Please call him    Bmp is back and potassium is down to 4.3    Add aldactone 12.5mg daily --I sent in rx and told him not to pick it up until we called him to do so    Schedule bmp in one week    Bmp 3 weeks after that and 3-4 weeks after that with visit with me      Thanks  I told him to reduce potatoes but can you review please

## 2021-05-04 NOTE — PROGRESS NOTES
History of Present Illness:    Francisco Williamson is a 70 year old male followed here by Dr. Pedro who returns for follow up on his blood pressure. In 1995, he underwent a #27mm [employer] [place]ordan aortic valve composite graft replacement with reimplantation of the coronary arteries. He had minimal CAD at that time. In 2017 we noted upper normal gradients across the  Aortic valve and fluoroscopy was done and was found to be normal. He has aneurysmal dilatation of the mid to distal ascending thoracic aorta. A CT scan is due in 3-2022.    CT coronary angiogram in 2013 noted calcium score of 327 with mild to moderate circumflex disease and trivial disease elsewhere    He has suffered a couple of falls, one in [place], resulting in subdural bleeds. He is on chronic warfarin.    Lipids on 3-3-2021: total cholesterol 171 HDL 64 LDL 89TG 97 drawn on Lipitor 80mg daily    BMP 3-3-2021: sodium 140 potassium 4.7 BUN 17 Creatinine 1.07  Bmp today is pending    He has a routine visit here in March and his BP was high. He had been drinking 2-3 beers per day and planned to cut this back; he states he is taking one or two beers per day. He was placed on Lozol and became dizzy and stopped this mostly due to leg cramps. He called our nurse line with ongoing borderline numbers and stated he felt dizzy whenever his BP was 137 or below. He is on Hytrin at 5mg daily as well as Metoprolol 100mg daily. Today he tells me he feels dizzy if BP is much under 130. He states he has had his BP cuff checked here and it is accurate.    Benazepril was increased from 10mg to 20mg daily    He occasionally takes Claritin-D as regular Claritin does not work; this is seasonal and he takes this three days per week on the average    We had a long discussion regarding adding aldactone but we need his BMP back to be certain we can proceed  He has no cardiac complaints today and valve had good click.        Impression/Plan:     1. S/p AVR composite valve replacment  1995  On warfarin   Lifelong SBE prophylaxis    2. HTN  Borderline control given aorta size  Benazepril was increased from 10mg to 20mg daily  No snoring at night  He states he has had testing for sleep apnea and it was negative    If potassium today is ok, we will plan to add aldactone 12.5mg daily  Will need BMP in one week, three weeks and see me in 6-7 weeks with BMP  When labs are back I will call him with a final plan  Potatoes are his major potassium food which he will reduce if we add aldactone.    ADDENDUM:  Add aldcatone as above-my nurse will call to review instructions and arrange follow up      3. Minimal CAD in past    4. ETOH-he has reduced to one or two beers per day    5. Dilated ascending aorta 4.5cm  Due for CT scan 2022    6. Dyslipidemia  controlled    It has been a pleasure seeing Francisco Williamson in follow up. I will see him in 2 months and be in touch by phone as labs come through.    30 minutes spent on the date of the encounter doing chart review, review of test results, patient visit and documentation     Axel Norman, MSN, APRN-BC, CNP  Cardiology    Orders Placed This Encounter   Procedures     Basic metabolic panel     Basic metabolic panel     Basic metabolic panel     Basic metabolic panel     Follow-Up with Cardiac Advanced Practice Provider     Orders Placed This Encounter   Medications     spironolactone (ALDACTONE) 25 MG tablet     Sig: Take 0.5 tablets (12.5 mg) by mouth daily     Dispense:  15 tablet     Refill:  1     There are no discontinued medications.      Encounter Diagnosis   Name Primary?     Benign essential hypertension        CURRENT MEDICATIONS:  Current Outpatient Medications   Medication Sig Dispense Refill     amLODIPine (NORVASC) 10 MG tablet Take 10 mg by mouth daily       atorvastatin (LIPITOR) 80 MG tablet Take 80 mg by mouth daily       benazepril (LOTENSIN) 10 MG tablet Take 2 tablets (20 mg) by mouth daily 180 tablet 3     cholecalciferol (VITAMIN  D) 1000 UNIT tablet Take 2 tablets by mouth daily. 100 tablet prn     co-enzyme Q-10 100 MG CAPS Take 1 capsule by mouth daily. 90 capsule prn     IRON PO Take by mouth daily       melatonin 3 MG tablet Take 3 mg by mouth nightly as needed for sleep       metoprolol succinate ER (TOPROL-XL) 100 MG 24 hr tablet Take 100 mg by mouth daily        OMEPRAZOLE PO Take 40 mg by mouth 2 times daily.         senna-docusate (SENNA S) 8.6-50 MG tablet Take 1 tablet by mouth 2 times daily as needed for constipation       spironolactone (ALDACTONE) 25 MG tablet Take 0.5 tablets (12.5 mg) by mouth daily 15 tablet 1     terazosin (HYTRIN) 5 MG capsule Take 5 mg by mouth At Bedtime       Warfarin Sodium (COUMADIN PO) Take 7 mg by mouth daily        loratadine (CLARITIN) 10 MG tablet Take 10 mg by mouth daily         ALLERGIES     Allergies   Allergen Reactions     Morphine Sulfate        PAST MEDICAL HISTORY:  Past Medical History:   Diagnosis Date     Aortic valve disease     ascending aortic aneurysm , AVR 1995     CAD (coronary artery disease)     minimal on CT cor angiogram     Colitis     radiation colitis, 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     Cystic medial necrosis (H)     fibrillin test negative for marfan's     Embolic stroke (H)     L eye -when INR low, another CVA when given Vit K before prostate surgery     First degree heart block     and RBBB     Gastro-oesophageal reflux disease      Gastrointestinal bleeding, upper     EGD - GASTRIC ULCER, EROSIVE ESOPHAGITIS, NON BLEEDING ESOPHAGEAL ULCERS, NON BLEEDING GASTRIC ULCER, LILLIE REYNA TEAR     Hyperlipidemia      Hypertension      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)     Intervertebral disk disease     cervicle and lumbar     Intervertebral disk disease     L4/5 disk herniation     Laceration     L wrist with complete severing of radial artery     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 Subdural hematoma was diagnosed on 4/20/2018 after acute neurologic decompensation, and patient was managed with right sided parietal craniectomy and frontal logan hole in [place].Transferred to [facility] 4/27 after he was stabilized. in [place]       PAST SURGICAL HISTORY:  Past Surgical History:   Procedure Laterality Date     AORTIC VALVE REPLACEMENT  1995    SJM 27mm [id number] serial# [id number]: aortic valve replacement with composite graft with reimplanation of the coronary arteries into the graft     COLONOSCOPY       craineotomy  04/2018     closed head injury while vacationing in [place] 4/27/2018 and required emergent right subdural hematoma evacuation     H CATARACT SURG[employer] PACKAGE Bilateral 2020    bilat     INSERT RADIATION SEEDS PROSTATE  3/15/2012    Procedure:INSERT RADIATION SEEDS PROSTATE; INSERT RADIATION SEEDS PROSTATE  - Paladium Seeds  110 seeds inserted; Surgeon:JONAHTAN BAY; Location:[facility]     MOHS MICROGRAPHIC PROCEDURE      SCC face     ORTHOPEDIC SURGERY      LEFT ROTATOR CUFF REPAIR     REPAIR ESOPHAGEAL STRICTURE      ?clip in esophagus       FAMILY HISTORY:  Family History   Problem Relation Age of Onset     Heart Murmur Father 50        mitral valve disease-Rheumatic valve disease     Alzheimer Disease Mother 95     Bipolar Disorder Sister      Prostate Cancer Brother      Skin Cancer No family hx of        SOCIAL HISTORY:  Social History     Socioeconomic History     Marital status:      Spouse name: None     Number of children: None     Years of education: None      Highest education level: None   Occupational History     Occupation: retired     Comment: prior medical device exec   Social Needs     Financial resource strain: None     Food insecurity     Worry: None     Inability: None     Transportation needs     Medical: None     Non-medical: None   Tobacco Use     Smoking status: Never Smoker     Smokeless tobacco: Never Used   Substance and Sexual Activity     Alcohol use: Yes     Comment: 2-3 beer/d     Drug use: No     Sexual activity: None   Lifestyle     Physical activity     Days per week: None     Minutes per session: None     Stress: None   Relationships     Social connections     Talks on phone: None     Gets together: None     Attends Hindu service: None     Active member of club or organization: None     Attends meetings of clubs or organizations: None     Relationship status: None     Intimate partner violence     Fear of current or ex partner: None     Emotionally abused: None     Physically abused: None     Forced sexual activity: None   Other Topics Concern     Parent/sibling w/ CABG, MI or angioplasty before 65F 55M? Not Asked      Service Not Asked     Blood Transfusions Not Asked     Caffeine Concern Not Asked     Occupational Exposure Not Asked     Hobby Hazards Not Asked     Sleep Concern Not Asked     Stress Concern Not Asked     Weight Concern Not Asked     Special Diet No     Back Care Not Asked     Exercise No     Bike Helmet Not Asked     Seat Belt Not Asked     Self-Exams Not Asked   Social History Narrative     None       Review of Systems:  Skin:  Negative       Eyes:  Positive for   cataract surgery both eyes  ENT:  Negative      Respiratory:  Negative       Cardiovascular:    Positive for more tired with increase of benazepril  Gastroenterology: Negative      Genitourinary:  Negative      Musculoskeletal:  Negative      Neurologic:  Positive for stroke    Psychiatric:  Negative      Heme/Lymph/Imm:  Negative      Endocrine:   "Negative        Physical Exam:  Vitals: BP (!) 146/86 (BP Location: Right arm, Cuff Size: Adult Large)   Pulse 64   Ht 1.803 m (5' 11\")   Wt 88.9 kg (195 lb 14.4 oz)   BMI 27.32 kg/m      Constitutional:  cooperative, alert and oriented, well developed, well nourished, in no acute distress        Skin:  warm and dry to the touch, no apparent skin lesions or masses noted          Head:  normocephalic, no masses or lesions   logan hole R skull    Eyes:  pupils equal and round, conjunctivae and lids unremarkable, sclera white, no xanthalasma, EOMS intact, no nystagmus        Lymph:No Cervical lymphadenopathy present;No thyromegaly     ENT:  no pallor or cyanosis, dentition good        Neck:  carotid pulses are full and equal bilaterally, JVP normal, no carotid bruit   transmission of AVR sounds    Respiratory:  normal breath sounds, clear to auscultation, normal A-P diameter, normal symmetry, normal respiratory excursion, no use of accessory muscles    mucus cleared after cough    Cardiac: regular rhythm occasional premature beats   crisp prosthetic valve sounds systolic ejection murmur;grade 1        pulses full and equal, no bruits auscultated                                        GI:  abdomen soft, non-tender, BS normoactive, no mass, no HSM, no bruits        Extremities and Muscular Skeletal:  no deformities, clubbing, cyanosis, erythema observed;no edema              Neurological:  no gross motor deficits   weak from disk and logan hole, unstable walk    Psych:  Alert and Oriented x 3      Recent Lab Results:  LIPID RESULTS:  Lab Results   Component Value Date    CHOL 171 03/03/2021    HDL 64 03/03/2021    LDL 89 03/03/2021    TRIG 97 03/03/2021    CHOLHDLRATIO 2.6 08/09/2012       LIVER ENZYME RESULTS:  Lab Results   Component Value Date    AST 23 03/03/2021    ALT 19 03/03/2021       CBC RESULTS:  Lab Results   Component Value Date    WBC 4.8 11/16/2017    RBC 4.43 11/16/2017    HGB 13.6 11/16/2017    HCT " 42.6 11/16/2017    MCV 96.3 11/16/2017    MCH 30.6 11/16/2017    MCHC 31.8 11/16/2017    RDW 15.8 11/16/2017     11/16/2017       BMP RESULTS:  Lab Results   Component Value Date     05/04/2021    POTASSIUM 4.3 05/04/2021    CHLORIDE 105 05/04/2021    CO2 29 05/04/2021    ANIONGAP 3 05/04/2021    GLC 86 05/04/2021    BUN 17 05/04/2021    CR 1.02 05/04/2021    GFRESTIMATED 73 05/04/2021    GFRESTBLACK 84 05/04/2021    SACHA 8.4 (L) 05/04/2021        A1C RESULTS:  No results found for: A1C    INR RESULTS:  Lab Results   Component Value Date    INR 2.31 (H) 08/08/2015    INR 2.59 (H) 06/23/2012           CC  Jeff Pedro MD  [id number]  [place] [id number]

## 2021-05-04 NOTE — PATIENT INSTRUCTIONS
If your potassium is ok today we will add aldactone 12.5mg (1/2 pill) daily    We will need labs in one week, three weeks after that and again 3-4 weeks later and see me back ten

## 2021-05-04 NOTE — LETTER
5/4/2021    [facility] [address]  [facility] [id number]    RE: Francisco Williamson       Dear Colleague,    I had the pleasure of seeing Francisco Williamson in the [facility].    Dizzy on lozol    History of Present Illness:    Francisco Williamson is a 70 year old male followed here by Dr. Pedro who returns for follow up on his blood pressure. In 1995, he underwent a #27mm [employer] Yordan aortic valve composite graft replacement with reimplantation of the coronary arteries. He had minimal CAD at that time. In 2017 we noted upper normal gradients across the  Aortic valve and fluoroscopy was done and was found to be normal. He has aneurysmal dilatation of the mid to distal ascending thoracic aorta. A CT scan is due in 3-2022.    CT coronary angiogram in 2013 noted calcium score of 327 with mild to moderate circumflex disease and trivial disease elsewhere    He has suffered a couple of falls, one in [place], resulting in subdural bleeds. He is on chronic warfarin.    Lipids on 3-3-2021: total cholesterol 171 HDL 64 LDL 89TG 97 drawn on Lipitor 80mg daily    BMP 3-3-2021: sodium 140 potassium 4.7 BUN 17 Creatinine 1.07  Bmp today is pending    He has a routine visit here in March and his BP was high. He had been drinking 2-3 beers per day and planned to cut this back; he states he is taking one or two beers per day. He was placed on Lozol and became dizzy and stopped this mostly due to leg cramps. He called our nurse line with ongoing borderline numbers and stated he felt dizzy whenever his BP was 137 or below. He is on Hytrin at 5mg daily as well as Metoprolol 100mg daily. Today he tells me he feels dizzy if BP is much under 130. He states he has had his BP cuff checked here and it is accurate.    Benazepril was increased from 10mg to 20mg daily    He occasionally takes Claritin-D as regular Claritin does not work; this is seasonal  and he takes this three days per week on the average    We had a long discussion regarding adding aldactone but we need his BMP back to be certain we can proceed  He has no cardiac complaints today and valve had good click.        Impression/Plan:     1. S/p AVR composite valve replacment 1995  On warfarin   Lifelong SBE prophylaxis    2. HTN  Borderline control given aorta size  Benazepril was increased from 10mg to 20mg daily  No snoring at night  He states he has had testing for sleep apnea and it was negative    If potassium today is ok, we will plan to add aldactone 12.5mg daily  Will need BMP in one week, three weeks and see me in 6-7 weeks with BMP  When labs are back I will call him with a final plan  Potatoes are his major potassium food which he will reduce if we add aldactone.    ADDENDUM:  Add aldcatone as above-my nurse will call to review instructions and arrange follow up      3. Minimal CAD in past    4. ETOH-he has reduced to one or two beers per day    5. Dilated ascending aorta 4.5cm  Due for CT scan 2022    6. Dyslipidemia  controlled    It has been a pleasure seeing Francisco Williamson in follow up. I will see him in 2 months and be in touch by phone as labs come through.    30 minutes spent on the date of the encounter doing chart review, review of test results, patient visit and documentation     Axel Norman, MSN, APRN-BC, CNP  Cardiology    Orders Placed This Encounter   Procedures     Basic metabolic panel     Basic metabolic panel     Basic metabolic panel     Basic metabolic panel     Follow-Up with Cardiac Advanced Practice Provider     Orders Placed This Encounter   Medications     spironolactone (ALDACTONE) 25 MG tablet     Sig: Take 0.5 tablets (12.5 mg) by mouth daily     Dispense:  15 tablet     Refill:  1     There are no discontinued medications.      Encounter Diagnosis   Name Primary?     Benign essential hypertension        CURRENT MEDICATIONS:  Current Outpatient  Medications   Medication Sig Dispense Refill     amLODIPine (NORVASC) 10 MG tablet Take 10 mg by mouth daily       atorvastatin (LIPITOR) 80 MG tablet Take 80 mg by mouth daily       benazepril (LOTENSIN) 10 MG tablet Take 2 tablets (20 mg) by mouth daily 180 tablet 3     cholecalciferol (VITAMIN D) 1000 UNIT tablet Take 2 tablets by mouth daily. 100 tablet prn     co-enzyme Q-10 100 MG CAPS Take 1 capsule by mouth daily. 90 capsule prn     IRON PO Take by mouth daily       melatonin 3 MG tablet Take 3 mg by mouth nightly as needed for sleep       metoprolol succinate ER (TOPROL-XL) 100 MG 24 hr tablet Take 100 mg by mouth daily        OMEPRAZOLE PO Take 40 mg by mouth 2 times daily.         senna-docusate (SENNA S) 8.6-50 MG tablet Take 1 tablet by mouth 2 times daily as needed for constipation       spironolactone (ALDACTONE) 25 MG tablet Take 0.5 tablets (12.5 mg) by mouth daily 15 tablet 1     terazosin (HYTRIN) 5 MG capsule Take 5 mg by mouth At Bedtime       Warfarin Sodium (COUMADIN PO) Take 7 mg by mouth daily        loratadine (CLARITIN) 10 MG tablet Take 10 mg by mouth daily         ALLERGIES     Allergies   Allergen Reactions     Morphine Sulfate        PAST MEDICAL HISTORY:  Past Medical History:   Diagnosis Date     Aortic valve disease     ascending aortic aneurysm , AVR 1995     CAD (coronary artery disease)     minimal on CT cor angiogram     Colitis     radiation colitis, 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     Cystic medial necrosis (H)     fibrillin test negative for marfan's     Embolic stroke (H)     L eye -when INR low, another CVA when given Vit K before prostate surgery     First degree heart block     and RBBB     Gastro-oesophageal reflux disease      Gastrointestinal bleeding, upper     EGD - GASTRIC ULCER, EROSIVE ESOPHAGITIS, NON BLEEDING ESOPHAGEAL ULCERS, NON BLEEDING GASTRIC ULCER, LILLIE REYNA TEAR     Hyperlipidemia      Hypertension      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)     Intervertebral disk disease     cervicle and lumbar     Intervertebral disk disease     L4/5 disk herniation     Laceration     L wrist with complete severing of radial artery     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 Subdural hematoma was diagnosed on 4/20/2018 after acute neurologic decompensation, and patient was managed with right sided parietal craniectomy and frontal logan hole in [place].Transferred to [facility] 4/27 after he was stabilized. in [place]       PAST SURGICAL HISTORY:  Past Surgical History:   Procedure Laterality Date     AORTIC VALVE REPLACEMENT  1995    SJM 27mm [id number] serial# [id number]: aortic valve replacement with composite graft with reimplanation of the coronary arteries into the graft     COLONOSCOPY       craineotomy  04/2018     closed head injury while vacationing in [place] 4/27/2018 and required emergent right subdural hematoma evacuation     H CATARACT SURGICAL PACKAGE Bilateral 2020    bilat     INSERT RADIATION SEEDS PROSTATE  3/15/2012    Procedure:INSERT RADIATION SEEDS PROSTATE; INSERT RADIATION SEEDS PROSTATE  - Paladium Seeds  110 seeds inserted; Surgeon:JONATHAN BAY; Location:[facility]     MOHS MICROGRAPHIC PROCEDURE      SCC face     ORTHOPEDIC SURGERY      LEFT ROTATOR CUFF REPAIR     REPAIR ESOPHAGEAL STRICTURE      ?clip in esophagus       FAMILY HISTORY:  Family History   Problem Relation Age of Onset     Heart Murmur Father 50        mitral valve disease-Rheumatic valve disease      Alzheimer Disease Mother 95     Bipolar Disorder Sister      Prostate Cancer Brother      Skin Cancer No family hx of        SOCIAL HISTORY:  Social History     Socioeconomic History     Marital status:      Spouse name: None     Number of children: None     Years of education: None     Highest education level: None   Occupational History     Occupation: retired     Comment: prior medical device exec   Social Needs     Financial resource strain: None     Food insecurity     Worry: None     Inability: None     Transportation needs     Medical: None     Non-medical: None   Tobacco Use     Smoking status: Never Smoker     Smokeless tobacco: Never Used   Substance and Sexual Activity     Alcohol use: Yes     Comment: 2-3 beer/d     Drug use: No     Sexual activity: None   Lifestyle     Physical activity     Days per week: None     Minutes per session: None     Stress: None   Relationships     Social connections     Talks on phone: None     Gets together: None     Attends [employer] service: None     Active member of club or organization: None     Attends meetings of clubs or organizations: None     Relationship status: None     Intimate partner violence     Fear of current or ex partner: None     Emotionally abused: None     Physically abused: None     Forced sexual activity: None   Other Topics Concern     Parent/sibling w/ CABG, MI or angioplasty before 65F 55M? Not Asked      Service Not Asked     Blood Transfusions Not Asked     Caffeine Concern Not Asked     Occupational Exposure Not Asked     Hobby Hazards Not Asked     Sleep Concern Not Asked     Stress Concern Not Asked     Weight Concern Not Asked     Special Diet No     Back Care Not Asked     Exercise No     Bike Helmet Not Asked     Seat Belt Not Asked     Self-Exams Not Asked   Social History Narrative     None       Review of Systems:  Skin:  Negative       Eyes:  Positive for   cataract surgery both eyes  ENT:  Negative      Respiratory:  " Negative       Cardiovascular:    Positive for more tired with increase of benazepril  Gastroenterology: Negative      Genitourinary:  Negative      Musculoskeletal:  Negative      Neurologic:  Positive for stroke    Psychiatric:  Negative      Heme/Lymph/Imm:  Negative      Endocrine:  Negative        Physical Exam:  Vitals: BP (!) 146/86 (BP Location: Right arm, Cuff Size: Adult Large)   Pulse 64   Ht 1.803 m (5' 11\")   Wt 88.9 kg (195 lb 14.4 oz)   BMI 27.32 kg/m      Constitutional:  cooperative, alert and oriented, well developed, well nourished, in no acute distress        Skin:  warm and dry to the touch, no apparent skin lesions or masses noted          Head:  normocephalic, no masses or lesions   logan hole R skull    Eyes:  pupils equal and round, conjunctivae and lids unremarkable, sclera white, no xanthalasma, EOMS intact, no nystagmus        Lymph:No Cervical lymphadenopathy present;No thyromegaly     ENT:  no pallor or cyanosis, dentition good        Neck:  carotid pulses are full and equal bilaterally, JVP normal, no carotid bruit   transmission of AVR sounds    Respiratory:  normal breath sounds, clear to auscultation, normal A-P diameter, normal symmetry, normal respiratory excursion, no use of accessory muscles    mucus cleared after cough    Cardiac: regular rhythm occasional premature beats   crisp prosthetic valve sounds systolic ejection murmur;grade 1        pulses full and equal, no bruits auscultated                                        GI:  abdomen soft, non-tender, BS normoactive, no mass, no HSM, no bruits        Extremities and Muscular Skeletal:  no deformities, clubbing, cyanosis, erythema observed;no edema              Neurological:  no gross motor deficits   weak from disk and logan hole, unstable walk    Psych:  Alert and Oriented x 3      Recent Lab Results:  LIPID RESULTS:  Lab Results   Component Value Date    CHOL 171 03/03/2021    HDL 64 03/03/2021    LDL 89 03/03/2021    " TRIG 97 03/03/2021    CHOLHDLRATIO 2.6 08/09/2012       LIVER ENZYME RESULTS:  Lab Results   Component Value Date    AST 23 03/03/2021    ALT 19 03/03/2021       CBC RESULTS:  Lab Results   Component Value Date    WBC 4.8 11/16/2017    RBC 4.43 11/16/2017    HGB 13.6 11/16/2017    HCT 42.6 11/16/2017    MCV 96.3 11/16/2017    MCH 30.6 11/16/2017    MCHC 31.8 11/16/2017    RDW 15.8 11/16/2017     11/16/2017       BMP RESULTS:  Lab Results   Component Value Date     05/04/2021    POTASSIUM 4.3 05/04/2021    CHLORIDE 105 05/04/2021    CO2 29 05/04/2021    ANIONGAP 3 05/04/2021    GLC 86 05/04/2021    BUN 17 05/04/2021    CR 1.02 05/04/2021    GFRESTIMATED 73 05/04/2021    GFRESTBLACK 84 05/04/2021    SACHA 8.4 (L) 05/04/2021        A1C RESULTS:  No results found for: A1C    INR RESULTS:  Lab Results   Component Value Date    INR 2.31 (H) 08/08/2015    INR 2.59 (H) 06/23/2012         Thank you for allowing me to participate in the care of your patient.      Sincerely,     Mini Norman, APRN CNP     [facility]    cc:   Jeff Pedro MD  [address]  [place] [address]

## 2021-05-11 DIAGNOSIS — I10 BENIGN ESSENTIAL HYPERTENSION: ICD-10-CM

## 2021-05-11 LAB
ANION GAP SERPL CALCULATED.3IONS-SCNC: 3 MMOL/L (ref 3–14)
BUN SERPL-MCNC: 15 MG/DL (ref 7–30)
CALCIUM SERPL-MCNC: 8.5 MG/DL (ref 8.5–10.1)
CHLORIDE SERPL-SCNC: 100 MMOL/L (ref 94–109)
CO2 SERPL-SCNC: 30 MMOL/L (ref 20–32)
CREAT SERPL-MCNC: 1.11 MG/DL (ref 0.66–1.25)
GFR SERPL CREATININE-BSD FRML MDRD: 66 ML/MIN/{1.73_M2}
GLUCOSE SERPL-MCNC: 90 MG/DL (ref 70–99)
POTASSIUM SERPL-SCNC: 4 MMOL/L (ref 3.4–5.3)
SODIUM SERPL-SCNC: 133 MMOL/L (ref 133–144)

## 2021-05-11 PROCEDURE — 80048 BASIC METABOLIC PNL TOTAL CA: CPT | Performed by: NURSE PRACTITIONER

## 2021-05-11 PROCEDURE — 36415 COLL VENOUS BLD VENIPUNCTURE: CPT | Performed by: NURSE PRACTITIONER

## 2021-05-13 ENCOUNTER — CARE COORDINATION (OUTPATIENT)
Dept: CARDIOLOGY | Facility: CLINIC | Age: 71
End: 2021-05-13

## 2021-05-13 NOTE — PROGRESS NOTES
Called pt, no answer, left voicemail requesting return call or [employer] message confirming he started aldactone 12.5mg daily and BP update.    JUAN Stone 4:12 PM 5/13/2021

## 2021-05-14 NOTE — PROGRESS NOTES
I would give the med a bit more time    Please have him send updates late next week and see if he has had his cuff checked for accuracy and is he off of the Claritin D for his allergies?    If cuff has not been checked we should arrange to have that done maybe at his lab visit 6-1

## 2021-05-14 NOTE — PROGRESS NOTES
Called and spoke with pt and discussed that ALESSIO Ge recommends giving the Aldactone at the current dose a bit more time to work. Discussed that will plan to call him late next week for another BP update. He agrees with this plan.     Also inquired if he has had his home BP cuff checked for accuracy and he confirms he has had it checked when he was in the clinic with Dr. Pedro on 3/1/21 and it was accurate.     Also inquired if he is still taking Claritin D for his allergies, as this can raise BP too. Pt states he is taking PRN, but not very often right now. Advised pt to ana the days he is taking Claritin D to see if it corresponds with days his BP is higher- he agrees with this plan.     JUAN Stone 12:41 PM 5/14/2021

## 2021-05-14 NOTE — PROGRESS NOTES
Received return call from pt who reports that he started Aldactone 12.5mg one week ago and his home BP is still running in the 140s/80s consistently, yesterday was 143/86.     Discussed with pt that will update ALESSIO Ge with this information and callback if any changes/recommendations. He agrees with this plan.    JUAN Stone 10:16 AM 5/14/2021

## 2021-05-16 ENCOUNTER — HEALTH MAINTENANCE LETTER (OUTPATIENT)
Age: 71
End: 2021-05-16

## 2021-05-21 NOTE — PROGRESS NOTES
Received call from pt who reports his BP at home have been in the 130s/80s this past week, most recent BP was 131/87.   Discussed with pt that will update ALESSIO Ge and call back if she has any recommendations, otherswise will wait to see what his BMP shows on 6/1/21.     JUAN Stone 1:57 PM 5/21/2021

## 2021-05-21 NOTE — PROGRESS NOTES
Reminder sent to call pt for another BP update when labs are done on 6/1/21.     JUAN Stone 3:01 PM 5/21/2021

## 2021-06-01 ENCOUNTER — CARE COORDINATION (OUTPATIENT)
Dept: CARDIOLOGY | Facility: CLINIC | Age: 71
End: 2021-06-01

## 2021-06-01 DIAGNOSIS — I10 BENIGN ESSENTIAL HYPERTENSION: ICD-10-CM

## 2021-06-01 LAB
ANION GAP SERPL CALCULATED.3IONS-SCNC: 2 MMOL/L (ref 3–14)
BUN SERPL-MCNC: 17 MG/DL (ref 7–30)
CALCIUM SERPL-MCNC: 8.5 MG/DL (ref 8.5–10.1)
CHLORIDE SERPL-SCNC: 102 MMOL/L (ref 94–109)
CO2 SERPL-SCNC: 31 MMOL/L (ref 20–32)
CREAT SERPL-MCNC: 1.1 MG/DL (ref 0.66–1.25)
GFR SERPL CREATININE-BSD FRML MDRD: 66 ML/MIN/{1.73_M2}
GLUCOSE SERPL-MCNC: 101 MG/DL (ref 70–99)
POTASSIUM SERPL-SCNC: 4.1 MMOL/L (ref 3.4–5.3)
SODIUM SERPL-SCNC: 135 MMOL/L (ref 133–144)

## 2021-06-01 PROCEDURE — 80048 BASIC METABOLIC PNL TOTAL CA: CPT | Performed by: INTERNAL MEDICINE

## 2021-06-01 PROCEDURE — 36415 COLL VENOUS BLD VENIPUNCTURE: CPT | Performed by: INTERNAL MEDICINE

## 2021-06-01 NOTE — PROGRESS NOTES
Called and spoke with pt and discussed that ALESSIO Ge reviewed lab results from today and everything looks fine.     Pt reports his BP has been running mid-130s/70s. BP today was 137/70.     Discussed with pt that will review with ALESSIO Ge and callback if any recommendations- he agrees with this plan.    Pt confirms he is scheduled for another BMP on 6/29/21 and visit with ALESSIO Ge on 7/2/21.     JUAN Stone 4:23 PM 6/1/2021

## 2021-06-03 NOTE — PROGRESS NOTES
Called pt, no answer, left voicemail that ALESSIO Ge does not recommend any medication changes at this time because she knows that he tends to get dizzy if his BP is much under 130. Left call back number for any questions/concerns.     JUAN Stone 3:17 PM 6/3/2021

## 2021-06-29 DIAGNOSIS — I10 BENIGN ESSENTIAL HYPERTENSION: ICD-10-CM

## 2021-06-29 LAB
ANION GAP SERPL CALCULATED.3IONS-SCNC: 3 MMOL/L (ref 3–14)
BUN SERPL-MCNC: 17 MG/DL (ref 7–30)
CALCIUM SERPL-MCNC: 8.7 MG/DL (ref 8.5–10.1)
CHLORIDE SERPL-SCNC: 102 MMOL/L (ref 94–109)
CO2 SERPL-SCNC: 30 MMOL/L (ref 20–32)
CREAT SERPL-MCNC: 1.16 MG/DL (ref 0.66–1.25)
GFR SERPL CREATININE-BSD FRML MDRD: 62 ML/MIN/{1.73_M2}
GLUCOSE SERPL-MCNC: 90 MG/DL (ref 70–99)
POTASSIUM SERPL-SCNC: 4.2 MMOL/L (ref 3.4–5.3)
SODIUM SERPL-SCNC: 135 MMOL/L (ref 133–144)

## 2021-06-29 PROCEDURE — 80048 BASIC METABOLIC PNL TOTAL CA: CPT | Performed by: NURSE PRACTITIONER

## 2021-06-29 PROCEDURE — 36415 COLL VENOUS BLD VENIPUNCTURE: CPT | Performed by: NURSE PRACTITIONER

## 2021-07-01 NOTE — PROGRESS NOTES
History of Present Illness:    Francisco Williamson is a 70 year old male followed here by Dr. Pedro who returns for follow up on his blood pressure. In 1995, he underwent a #27mm [employer] [place] aortic valve composite graft replacement with reimplantation of the coronary arteries. He had minimal CAD at that time. In 2017 we noted upper normal gradients across the  Aortic valve and fluoroscopy was done and was found to be normal. He has aneurysmal dilatation of the mid to distal ascending thoracic aorta. A CT scan is due in 3-2022.     CT coronary angiogram in 2013 noted calcium score of 327 with mild to moderate circumflex disease and trivial disease elsewhere     He has suffered a couple of falls, one in [place], resulting in subdural bleeds. He is on chronic warfarin.     Lipids on 3-3-2021: total cholesterol 171 HDL 64 LDL 89TG 97 drawn on Lipitor 80mg daily          He has a routine visit here in March and his BP was high. He had been drinking 2-3 beers per day and planned to cut this back; he states he is taking one or two beers per day. He was placed on Lozol and became dizzy and stopped this mostly due to leg cramps. He called our nurse line with ongoing borderline numbers and stated he felt dizzy whenever his BP was 137 or below. He is on Hytrin at 5mg daily as well as Metoprolol 100mg daily. Today he tells me he feels dizzy if BP is much under 130. He states he has had his BP cuff checked here and it is accurate.     Benazepril was increased from 10mg to 20mg daily then but I note now that he is only taking 10mg daily and has not increased that dose    I added Aldactone 12.5mg daily and his BP now at home is 130 or under except first thing in AM, he approaches 150 typically.     His home cuff has been checked here and is accurate; he always runs higher here    BMP's on aldactone have been stable  On 6-: sodium 135 potassium 4.2 BUN 17 Creatinine 1.16 GFR 62    He occasionally takes Claritin-D as regular  Claritin does not work; this is seasonal and he takes this three days per week on the average     He has no cardiac complaints today and valve had good click.      I have reviewed all of his labs, meds and called his pharmacy to clarify his benazepril dosing.     Impression/Plan:      1. S/p AVR composite valve replacment 1995  On warfarin   Lifelong SBE prophylaxis     2. HTN  improved control given aorta size but high in am  Benazepril will be increased from 10mg daily to 10mg bid  He will have labs in 4-6 weeks and my nurse will call him to check in on his BP in 2-3 weeks  No snoring at night  He states he has had testing for sleep apnea and it was negative     If things are stable at that point, we will see him in January for his annual visit          3. Minimal CAD in past     4. ETOH-he has reduced to one or two beers per day     5. Dilated ascending aorta 4.5cm  Due for CT scan 2022     6. Dyslipidemia  Controlled    30 minutes spent on the date of the encounter doing chart review, review of test results, patient visit and documentation     Axel Norman, MSN, APRN-BC, CNP  Cardiology    Orders Placed This Encounter   Procedures     Basic metabolic panel     Orders Placed This Encounter   Medications     spironolactone (ALDACTONE) 25 MG tablet     Sig: Take 0.5 tablets (12.5 mg) by mouth daily     Dispense:  45 tablet     Refill:  3     benazepril (LOTENSIN) 10 MG tablet     Sig: Take 1 tablet (10 mg) by mouth 2 times daily     Dispense:  180 tablet     Refill:  3     Medications Discontinued During This Encounter   Medication Reason     spironolactone (ALDACTONE) 25 MG tablet Reorder     benazepril (LOTENSIN) 10 MG tablet Reorder         Encounter Diagnosis   Name Primary?     Benign essential hypertension        CURRENT MEDICATIONS:  Current Outpatient Medications   Medication Sig Dispense Refill     amLODIPine (NORVASC) 10 MG tablet Take 10 mg by mouth daily       atorvastatin (LIPITOR) 80 MG tablet  Take 80 mg by mouth daily       benazepril (LOTENSIN) 10 MG tablet Take 1 tablet (10 mg) by mouth 2 times daily 180 tablet 3     cholecalciferol (VITAMIN D) 1000 UNIT tablet Take 2 tablets by mouth daily. 100 tablet prn     co-enzyme Q-10 100 MG CAPS Take 1 capsule by mouth daily. 90 capsule prn     IRON PO Take by mouth daily       loratadine (CLARITIN) 10 MG tablet Take 10 mg by mouth daily       metoprolol succinate ER (TOPROL-XL) 100 MG 24 hr tablet Take 100 mg by mouth daily        OMEPRAZOLE PO Take 40 mg by mouth 2 times daily.         senna-docusate (SENNA S) 8.6-50 MG tablet Take 1 tablet by mouth 2 times daily as needed for constipation       spironolactone (ALDACTONE) 25 MG tablet Take 0.5 tablets (12.5 mg) by mouth daily 45 tablet 3     terazosin (HYTRIN) 5 MG capsule Take 5 mg by mouth At Bedtime       Warfarin Sodium (COUMADIN PO) Take 7 mg by mouth daily        melatonin 3 MG tablet Take 3 mg by mouth nightly as needed for sleep         ALLERGIES     Allergies   Allergen Reactions     Morphine Sulfate        PAST MEDICAL HISTORY:  Past Medical History:   Diagnosis Date     Aortic valve disease     ascending aortic aneurysm , AVR 1995     CAD (coronary artery disease)     minimal on CT cor angiogram     Colitis     radiation colitis, 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     Cystic medial necrosis (H)     fibrillin test negative for marfan's     Embolic stroke (H)     L eye -when INR low, another CVA when given Vit K before prostate surgery     First degree heart block     and RBBB     Gastro-oesophageal reflux disease      Gastrointestinal bleeding, upper     EGD - GASTRIC ULCER, EROSIVE ESOPHAGITIS, NON BLEEDING ESOPHAGEAL ULCERS, NON BLEEDING GASTRIC ULCER, LILLIE REYNA TEAR     Hyperlipidemia      Hypertension      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)     Intervertebral disk disease     cervicle and lumbar      Intervertebral disk disease     L4/5 disk herniation     Laceration     L wrist with complete severing of radial artery     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 Subdural hematoma was diagnosed on 4/20/2018 after acute neurologic decompensation, and patient was managed with right sided parietal craniectomy and frontal logan hole in [place].Transferred to [facility] 4/27 after he was stabilized. in [place]       PAST SURGICAL HISTORY:  Past Surgical History:   Procedure Laterality Date     AORTIC VALVE REPLACEMENT  1995    SJ 27mm [id number] serial# [id number]: aortic valve replacement with composite graft with reimplanation of the coronary arteries into the graft     COLONOSCOPY       craineotomy  04/2018     closed head injury while vacationing in [place] 4/27/2018 and required emergent right subdural hematoma evacuation     H CATARACT SURGICAL PACKAGE Bilateral 2020    bilat     INSERT RADIATION SEEDS PROSTATE  3/15/2012    Procedure:INSERT RADIATION SEEDS PROSTATE; INSERT RADIATION SEEDS PROSTATE  - Paladium Seeds  110 seeds inserted; Surgeon:JONATHAN BAY; Location: [facility]     MOHS MICROGRAPHIC PROCEDURE      SCC face     ORTHOPEDIC SURGERY      LEFT ROTATOR CUFF REPAIR     REPAIR ESOPHAGEAL STRICTURE      ?clip in esophagus       FAMILY HISTORY:  Family History   Problem Relation Age of Onset     Heart Murmur Father 50        mitral valve disease-Rheumatic valve disease     Alzheimer Disease Mother 95     Bipolar Disorder Sister      Prostate Cancer Brother      Skin Cancer No family hx of        SOCIAL  HISTORY:  Social History     Socioeconomic History     Marital status:      Spouse name: None     Number of children: None     Years of education: None     Highest education level: None   Occupational History     Occupation: retired     Comment: prior medical device exec   Social Needs     Financial resource strain: None     Food insecurity     Worry: None     Inability: None     Transportation needs     Medical: None     Non-medical: None   Tobacco Use     Smoking status: Never Smoker     Smokeless tobacco: Never Used   Substance and Sexual Activity     Alcohol use: Yes     Comment: 2-3 beer/d     Drug use: No     Sexual activity: None   Lifestyle     Physical activity     Days per week: None     Minutes per session: None     Stress: None   Relationships     Social connections     Talks on phone: None     Gets together: None     Attends [employer] service: None     Active member of club or organization: None     Attends meetings of clubs or organizations: None     Relationship status: None     Intimate partner violence     Fear of current or ex partner: None     Emotionally abused: None     Physically abused: None     Forced sexual activity: None   Other Topics Concern     Parent/sibling w/ CABG, MI or angioplasty before 65F 55M? Not Asked      Service Not Asked     Blood Transfusions Not Asked     Caffeine Concern Not Asked     Occupational Exposure Not Asked     Hobby Hazards Not Asked     Sleep Concern Not Asked     Stress Concern Not Asked     Weight Concern Not Asked     Special Diet No     Back Care Not Asked     Exercise No     Bike Helmet Not Asked     Seat Belt Not Asked     Self-Exams Not Asked   Social History Narrative     None       Review of Systems:  Skin:  Negative       Eyes:  Positive for   cataract surgery both eyes  ENT:  Negative      Respiratory:  Negative       Cardiovascular:    Positive for more tired with increase of benazepril  Gastroenterology: Negative      Genitourinary:   "Negative      Musculoskeletal:  Negative      Neurologic:  Positive for stroke    Psychiatric:  Negative      Heme/Lymph/Imm:  Negative      Endocrine:  Negative        Physical Exam:  Vitals: BP (!) 154/90   Pulse 69   Ht 1.803 m (5' 10.98\")   Wt 88.5 kg (195 lb)   BMI 27.21 kg/m      Constitutional:  cooperative, alert and oriented, well developed, well nourished, in no acute distress        Skin:  warm and dry to the touch, no apparent skin lesions or masses noted          Head:  normocephalic, no masses or lesions   logan hole R skull    Eyes:  pupils equal and round, conjunctivae and lids unremarkable, sclera white, no xanthalasma, EOMS intact, no nystagmus        Lymph:No Cervical lymphadenopathy present;No thyromegaly     ENT:  no pallor or cyanosis, dentition good        Neck:  carotid pulses are full and equal bilaterally, JVP normal, no carotid bruit   transmission of AVR sounds    Respiratory:  normal breath sounds, clear to auscultation, normal A-P diameter, normal symmetry, normal respiratory excursion, no use of accessory muscles    mucus cleared after cough    Cardiac: regular rhythm occasional premature beats   crisp prosthetic valve sounds systolic ejection murmur;grade 1        pulses full and equal, no bruits auscultated                                        GI:  abdomen soft, non-tender, BS normoactive, no mass, no HSM, no bruits        Extremities and Muscular Skeletal:  no deformities, clubbing, cyanosis, erythema observed;no edema              Neurological:  no gross motor deficits   weak from disk and logan hole, unstable walk    Psych:  Alert and Oriented x 3      Recent Lab Results:  LIPID RESULTS:  Lab Results   Component Value Date    CHOL 171 03/03/2021    HDL 64 03/03/2021    LDL 89 03/03/2021    TRIG 97 03/03/2021    CHOLHDLRATIO 2.6 08/09/2012       LIVER ENZYME RESULTS:  Lab Results   Component Value Date    AST 23 03/03/2021    ALT 19 03/03/2021       CBC RESULTS:  Lab Results "   Component Value Date    WBC 4.8 11/16/2017    RBC 4.43 11/16/2017    HGB 13.6 11/16/2017    HCT 42.6 11/16/2017    MCV 96.3 11/16/2017    MCH 30.6 11/16/2017    MCHC 31.8 11/16/2017    RDW 15.8 11/16/2017     11/16/2017       BMP RESULTS:  Lab Results   Component Value Date     06/29/2021    POTASSIUM 4.2 06/29/2021    CHLORIDE 102 06/29/2021    CO2 30 06/29/2021    ANIONGAP 3 06/29/2021    GLC 90 06/29/2021    BUN 17 06/29/2021    CR 1.16 06/29/2021    GFRESTIMATED 62 06/29/2021    GFRESTBLACK 72 06/29/2021    SACHA 8.7 06/29/2021        A1C RESULTS:  No results found for: A1C    INR RESULTS:  Lab Results   Component Value Date    INR 2.31 (H) 08/08/2015    INR 2.59 (H) 06/23/2012           CC  Mini Norman APRN CNP  [id number]  [place] [id number]

## 2021-07-02 ENCOUNTER — OFFICE VISIT (OUTPATIENT)
Dept: CARDIOLOGY | Facility: CLINIC | Age: 71
End: 2021-07-02
Payer: COMMERCIAL

## 2021-07-02 ENCOUNTER — DOCUMENTATION ONLY (OUTPATIENT)
Dept: CARDIOLOGY | Facility: CLINIC | Age: 71
End: 2021-07-02

## 2021-07-02 VITALS
DIASTOLIC BLOOD PRESSURE: 90 MMHG | BODY MASS INDEX: 27.3 KG/M2 | HEART RATE: 69 BPM | SYSTOLIC BLOOD PRESSURE: 154 MMHG | WEIGHT: 195 LBS | HEIGHT: 71 IN

## 2021-07-02 DIAGNOSIS — I10 BENIGN ESSENTIAL HYPERTENSION: ICD-10-CM

## 2021-07-02 PROCEDURE — 99214 OFFICE O/P EST MOD 30 MIN: CPT | Performed by: NURSE PRACTITIONER

## 2021-07-02 RX ORDER — BENAZEPRIL HYDROCHLORIDE 10 MG/1
10 TABLET ORAL 2 TIMES DAILY
Qty: 180 TABLET | Refills: 3 | Status: SHIPPED | OUTPATIENT
Start: 2021-07-02 | End: 2022-07-11

## 2021-07-02 RX ORDER — SPIRONOLACTONE 25 MG/1
12.5 TABLET ORAL DAILY
Qty: 45 TABLET | Refills: 3 | Status: SHIPPED | OUTPATIENT
Start: 2021-07-02 | End: 2022-05-18

## 2021-07-02 ASSESSMENT — MIFFLIN-ST. JEOR: SCORE: 1656.38

## 2021-07-02 NOTE — PATIENT INSTRUCTIONS
Change Benazepril to 10mg twice daily    Labs non-fasting in one month    See us in march for your annual visit

## 2021-07-02 NOTE — LETTER
7/2/2021    [facility] [address]  [facility] [id number]    RE: Francisco Williamson       Dear Colleague,    I had the pleasure of seeing Francisco Williamson in the [facility].    History of Present Illness:    Francisco Williamson is a 70 year old male followed here by Dr. Pedro who returns for follow up on his blood pressure. In 1995, he underwent a #27mm [employer] Yordan aortic valve composite graft replacement with reimplantation of the coronary arteries. He had minimal CAD at that time. In 2017 we noted upper normal gradients across the  Aortic valve and fluoroscopy was done and was found to be normal. He has aneurysmal dilatation of the mid to distal ascending thoracic aorta. A CT scan is due in 3-2022.     CT coronary angiogram in 2013 noted calcium score of 327 with mild to moderate circumflex disease and trivial disease elsewhere     He has suffered a couple of falls, one in [place], resulting in subdural bleeds. He is on chronic warfarin.     Lipids on 3-3-2021: total cholesterol 171 HDL 64 LDL 89TG 97 drawn on Lipitor 80mg daily          He has a routine visit here in March and his BP was high. He had been drinking 2-3 beers per day and planned to cut this back; he states he is taking one or two beers per day. He was placed on Lozol and became dizzy and stopped this mostly due to leg cramps. He called our nurse line with ongoing borderline numbers and stated he felt dizzy whenever his BP was 137 or below. He is on Hytrin at 5mg daily as well as Metoprolol 100mg daily. Today he tells me he feels dizzy if BP is much under 130. He states he has had his BP cuff checked here and it is accurate.     Benazepril was increased from 10mg to 20mg daily then but I note now that he is only taking 10mg daily and has not increased that dose    I added Aldactone 12.5mg daily and his BP now at home is 130 or under except first thing in  AM, he approaches 150 typically.     His home cuff has been checked here and is accurate; he always runs higher here    BMP's on aldactone have been stable  On 6-: sodium 135 potassium 4.2 BUN 17 Creatinine 1.16 GFR 62    He occasionally takes Claritin-D as regular Claritin does not work; this is seasonal and he takes this three days per week on the average     He has no cardiac complaints today and valve had good click.      I have reviewed all of his labs, meds and called his pharmacy to clarify his benazepril dosing.     Impression/Plan:      1. S/p AVR composite valve replacment 1995  On warfarin   Lifelong SBE prophylaxis     2. HTN  improved control given aorta size but high in am  Benazepril will be increased from 10mg daily to 10mg bid  He will have labs in 4-6 weeks and my nurse will call him to check in on his BP in 2-3 weeks  No snoring at night  He states he has had testing for sleep apnea and it was negative     If things are stable at that point, we will see him in January for his annual visit          3. Minimal CAD in past     4. ETOH-he has reduced to one or two beers per day     5. Dilated ascending aorta 4.5cm  Due for CT scan 2022     6. Dyslipidemia  Controlled    30 minutes spent on the date of the encounter doing chart review, review of test results, patient visit and documentation     Axel Norman, MSN, APRN-BC, CNP  Cardiology    Orders Placed This Encounter   Procedures     Basic metabolic panel     Orders Placed This Encounter   Medications     spironolactone (ALDACTONE) 25 MG tablet     Sig: Take 0.5 tablets (12.5 mg) by mouth daily     Dispense:  45 tablet     Refill:  3     benazepril (LOTENSIN) 10 MG tablet     Sig: Take 1 tablet (10 mg) by mouth 2 times daily     Dispense:  180 tablet     Refill:  3     Medications Discontinued During This Encounter   Medication Reason     spironolactone (ALDACTONE) 25 MG tablet Reorder     benazepril (LOTENSIN) 10 MG tablet Reorder          Encounter Diagnosis   Name Primary?     Benign essential hypertension        CURRENT MEDICATIONS:  Current Outpatient Medications   Medication Sig Dispense Refill     amLODIPine (NORVASC) 10 MG tablet Take 10 mg by mouth daily       atorvastatin (LIPITOR) 80 MG tablet Take 80 mg by mouth daily       benazepril (LOTENSIN) 10 MG tablet Take 1 tablet (10 mg) by mouth 2 times daily 180 tablet 3     cholecalciferol (VITAMIN D) 1000 UNIT tablet Take 2 tablets by mouth daily. 100 tablet prn     co-enzyme Q-10 100 MG CAPS Take 1 capsule by mouth daily. 90 capsule prn     IRON PO Take by mouth daily       loratadine (CLARITIN) 10 MG tablet Take 10 mg by mouth daily       metoprolol succinate ER (TOPROL-XL) 100 MG 24 hr tablet Take 100 mg by mouth daily        OMEPRAZOLE PO Take 40 mg by mouth 2 times daily.         senna-docusate (SENNA S) 8.6-50 MG tablet Take 1 tablet by mouth 2 times daily as needed for constipation       spironolactone (ALDACTONE) 25 MG tablet Take 0.5 tablets (12.5 mg) by mouth daily 45 tablet 3     terazosin (HYTRIN) 5 MG capsule Take 5 mg by mouth At Bedtime       Warfarin Sodium (COUMADIN PO) Take 7 mg by mouth daily        melatonin 3 MG tablet Take 3 mg by mouth nightly as needed for sleep         ALLERGIES     Allergies   Allergen Reactions     Morphine Sulfate        PAST MEDICAL HISTORY:  Past Medical History:   Diagnosis Date     Aortic valve disease     ascending aortic aneurysm , AVR 1995     CAD (coronary artery disease)     minimal on CT cor angiogram     Colitis     radiation colitis, 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     Cystic medial necrosis (H)     fibrillin test negative for marfan's     Embolic stroke (H)     L eye -when INR low, another CVA when given Vit K before prostate surgery     First degree heart block     and RBBB     Gastro-oesophageal reflux disease      Gastrointestinal bleeding, upper     EGD - GASTRIC ULCER, EROSIVE ESOPHAGITIS, NON BLEEDING ESOPHAGEAL ULCERS, NON  BLEEDING GASTRIC ULCER, LILLIE REYNA TEAR     Hyperlipidemia      Hypertension      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)     Intervertebral disk disease     cervicle and lumbar     Intervertebral disk disease     L4/5 disk herniation     Laceration     L wrist with complete severing of radial artery     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 Subdural hematoma was diagnosed on 4/20/2018 after acute neurologic decompensation, and patient was managed with right sided parietal craniectomy and frontal logan hole in [place].Transferred to [facility] 4/27 after he was stabilized. in [place]       PAST SURGICAL HISTORY:  Past Surgical History:   Procedure Laterality Date     AORTIC VALVE REPLACEMENT  1995    SJ 27mm [id number] serial# [id number]: aortic valve replacement with composite graft with reimplanation of the coronary arteries into the graft     COLONOSCOPY       craineotomy  04/2018     closed head injury while vacationing in [place] 4/27/2018 and required emergent right subdural hematoma evacuation     H CATARACT SURGICAL PACKAGE Bilateral 2020    bilat     INSERT RADIATION SEEDS PROSTATE  3/15/2012    Procedure:INSERT RADIATION SEEDS PROSTATE; INSERT RADIATION SEEDS PROSTATE  - Paladium Seeds  110 seeds inserted; Surgeon:JONATHAN BAY; Location:[facility]     MOHS MICROGRAPHIC PROCEDURE      SCC face     ORTHOPEDIC SURGERY      LEFT ROTATOR CUFF REPAIR     REPAIR ESOPHAGEAL STRICTURE      ?clip in esophagus        FAMILY HISTORY:  Family History   Problem Relation Age of Onset     Heart Murmur Father 50        mitral valve disease-Rheumatic valve disease     Alzheimer Disease Mother 95     Bipolar Disorder Sister      Prostate Cancer Brother      Skin Cancer No family hx of        SOCIAL HISTORY:  Social History     Socioeconomic History     Marital status:      Spouse name: None     Number of children: None     Years of education: None     Highest education level: None   Occupational History     Occupation: retired     Comment: prior medical device exec   Social Needs     Financial resource strain: None     Food insecurity     Worry: None     Inability: None     Transportation needs     Medical: None     Non-medical: None   Tobacco Use     Smoking status: Never Smoker     Smokeless tobacco: Never Used   Substance and Sexual Activity     Alcohol use: Yes     Comment: 2-3 beer/d     Drug use: No     Sexual activity: None   Lifestyle     Physical activity     Days per week: None     Minutes per session: None     Stress: None   Relationships     Social connections     Talks on phone: None     Gets together: None     Attends Alevism service: None     Active member of club or organization: None     Attends meetings of clubs or organizations: None     Relationship status: None     Intimate partner violence     Fear of current or ex partner: None     Emotionally abused: None     Physically abused: None     Forced sexual activity: None   Other Topics Concern     Parent/sibling w/ CABG, MI or angioplasty before 65F 55M? Not Asked      Service Not Asked     Blood Transfusions Not Asked     Caffeine Concern Not Asked     Occupational Exposure Not Asked     Hobby Hazards Not Asked     Sleep Concern Not Asked     Stress Concern Not Asked     Weight Concern Not Asked     Special Diet No     Back Care Not Asked     Exercise No     Bike Helmet Not Asked     Seat Belt Not Asked     Self-Exams Not Asked   Social History  "Narrative     None       Review of Systems:  Skin:  Negative       Eyes:  Positive for   cataract surgery both eyes  ENT:  Negative      Respiratory:  Negative       Cardiovascular:    Positive for more tired with increase of benazepril  Gastroenterology: Negative      Genitourinary:  Negative      Musculoskeletal:  Negative      Neurologic:  Positive for stroke    Psychiatric:  Negative      Heme/Lymph/Imm:  Negative      Endocrine:  Negative        Physical Exam:  Vitals: BP (!) 154/90   Pulse 69   Ht 1.803 m (5' 10.98\")   Wt 88.5 kg (195 lb)   BMI 27.21 kg/m      Constitutional:  cooperative, alert and oriented, well developed, well nourished, in no acute distress        Skin:  warm and dry to the touch, no apparent skin lesions or masses noted          Head:  normocephalic, no masses or lesions   logan hole R skull    Eyes:  pupils equal and round, conjunctivae and lids unremarkable, sclera white, no xanthalasma, EOMS intact, no nystagmus        Lymph:No Cervical lymphadenopathy present;No thyromegaly     ENT:  no pallor or cyanosis, dentition good        Neck:  carotid pulses are full and equal bilaterally, JVP normal, no carotid bruit   transmission of AVR sounds    Respiratory:  normal breath sounds, clear to auscultation, normal A-P diameter, normal symmetry, normal respiratory excursion, no use of accessory muscles    mucus cleared after cough    Cardiac: regular rhythm occasional premature beats   crisp prosthetic valve sounds systolic ejection murmur;grade 1        pulses full and equal, no bruits auscultated                                        GI:  abdomen soft, non-tender, BS normoactive, no mass, no HSM, no bruits        Extremities and Muscular Skeletal:  no deformities, clubbing, cyanosis, erythema observed;no edema              Neurological:  no gross motor deficits   weak from disk and logan hole, unstable walk    Psych:  Alert and Oriented x 3      Recent Lab Results:  LIPID RESULTS:  Lab " Results   Component Value Date    CHOL 171 03/03/2021    HDL 64 03/03/2021    LDL 89 03/03/2021    TRIG 97 03/03/2021    CHOLHDLRATIO 2.6 08/09/2012       LIVER ENZYME RESULTS:  Lab Results   Component Value Date    AST 23 03/03/2021    ALT 19 03/03/2021       CBC RESULTS:  Lab Results   Component Value Date    WBC 4.8 11/16/2017    RBC 4.43 11/16/2017    HGB 13.6 11/16/2017    HCT 42.6 11/16/2017    MCV 96.3 11/16/2017    MCH 30.6 11/16/2017    MCHC 31.8 11/16/2017    RDW 15.8 11/16/2017     11/16/2017       BMP RESULTS:  Lab Results   Component Value Date     06/29/2021    POTASSIUM 4.2 06/29/2021    CHLORIDE 102 06/29/2021    CO2 30 06/29/2021    ANIONGAP 3 06/29/2021    GLC 90 06/29/2021    BUN 17 06/29/2021    CR 1.16 06/29/2021    GFRESTIMATED 62 06/29/2021    GFRESTBLACK 72 06/29/2021    SACHA 8.7 06/29/2021        A1C RESULTS:  No results found for: A1C    INR RESULTS:  Lab Results   Component Value Date    INR 2.31 (H) 08/08/2015    INR 2.59 (H) 06/23/2012           CC  SOFYA Quintero CNP  [address]  [place]  [place] [address]                    Thank you for allowing me to participate in the care of your patient.      Sincerely,     SOFYA Quintero CNP     [facility]  cc:   SOFYA Quintero CNP  [address]  [place]  [place] [address]

## 2021-07-02 NOTE — PROGRESS NOTES
He has good Bp at home but a bit high in the am    He was supposed to be on benazepril 20mg daily but he was only takiing 10mg once daily    So I changed him to 10mg twice daily so added 10mg in am to 10mg he was taking in am    He will have a bmp in one month    Can you call him in 2-3 weeks and see what BP is doing

## 2021-07-23 NOTE — PROGRESS NOTES
Called pt, no answer, left VM requesting return call or [employer] message with BP update since dose increase of benazapril.     Pt is scheduled for BMP on 8/2/21.     JUAN Stone 5:09 PM 7/23/2021

## 2021-07-26 NOTE — PROGRESS NOTES
Received return call from pt who reports his BP has been much better controlled consistently since increasing benazapril dose to 10mg BID. He reports BP today is 123/78. He confirms he will get BMP lab on 8/2/21 that is scheduled.     Routed to ALESSIO Ge as NATHANAEL Stone RN 1:45 PM 7/26/2021

## 2021-08-02 ENCOUNTER — LAB (OUTPATIENT)
Dept: LAB | Facility: CLINIC | Age: 71
End: 2021-08-02
Payer: COMMERCIAL

## 2021-08-02 DIAGNOSIS — I10 BENIGN ESSENTIAL HYPERTENSION: ICD-10-CM

## 2021-08-02 LAB
ANION GAP SERPL CALCULATED.3IONS-SCNC: 1 MMOL/L (ref 3–14)
BUN SERPL-MCNC: 18 MG/DL (ref 7–30)
CALCIUM SERPL-MCNC: 8.5 MG/DL (ref 8.5–10.1)
CHLORIDE BLD-SCNC: 104 MMOL/L (ref 94–109)
CO2 SERPL-SCNC: 31 MMOL/L (ref 20–32)
CREAT SERPL-MCNC: 1.18 MG/DL (ref 0.66–1.25)
GFR SERPL CREATININE-BSD FRML MDRD: 61 ML/MIN/1.73M2
GLUCOSE BLD-MCNC: 98 MG/DL (ref 70–99)
POTASSIUM BLD-SCNC: 3.9 MMOL/L (ref 3.4–5.3)
SODIUM SERPL-SCNC: 136 MMOL/L (ref 133–144)

## 2021-08-02 PROCEDURE — 80048 BASIC METABOLIC PNL TOTAL CA: CPT

## 2021-08-02 PROCEDURE — 36415 COLL VENOUS BLD VENIPUNCTURE: CPT

## 2021-08-03 NOTE — PROGRESS NOTES
RN called patient and updated him with his lab work and ALPHONSO Axel's recommendations. Patient did confirm for RN that he is taking the benazepril 10mg BID as was instructed and his systolic BP is <130 consistently. Patient will update us with any further questions or should his systolic BP become consistently >130.       Make sure he is on the higher dose of benazepril and if BP is staying 130 or less, we are good to go; no changes (ALPHONSO Axel)    Component      Latest Ref Rng & Units 8/2/2021   Sodium      133 - 144 mmol/L 136   Potassium      3.4 - 5.3 mmol/L 3.9   Chloride      94 - 109 mmol/L 104   Carbon Dioxide      20 - 32 mmol/L 31   Anion Gap      3 - 14 mmol/L 1 (L)   Urea Nitrogen      7 - 30 mg/dL 18   Creatinine      0.66 - 1.25 mg/dL 1.18   Calcium      8.5 - 10.1 mg/dL 8.5   Glucose      70 - 99 mg/dL 98   GFR Estimate      >60 mL/min/1.73m2 61

## 2021-09-05 ENCOUNTER — HEALTH MAINTENANCE LETTER (OUTPATIENT)
Age: 71
End: 2021-09-05

## 2021-12-04 ENCOUNTER — APPOINTMENT (OUTPATIENT)
Dept: CT IMAGING | Facility: CLINIC | Age: 71
End: 2021-12-04
Attending: EMERGENCY MEDICINE
Payer: COMMERCIAL

## 2021-12-04 ENCOUNTER — HOSPITAL ENCOUNTER (EMERGENCY)
Facility: CLINIC | Age: 71
Discharge: HOME | End: 2021-12-04
Attending: EMERGENCY MEDICINE | Admitting: EMERGENCY MEDICINE
Payer: COMMERCIAL

## 2021-12-04 VITALS
HEART RATE: 80 BPM | HEIGHT: 72 IN | TEMPERATURE: 97.1 F | WEIGHT: 190 LBS | DIASTOLIC BLOOD PRESSURE: 108 MMHG | RESPIRATION RATE: 14 BRPM | SYSTOLIC BLOOD PRESSURE: 141 MMHG | OXYGEN SATURATION: 97 % | BODY MASS INDEX: 25.73 KG/M2

## 2021-12-04 DIAGNOSIS — S09.90XA INJURY OF HEAD, INITIAL ENCOUNTER: ICD-10-CM

## 2021-12-04 DIAGNOSIS — R79.1 SUBTHERAPEUTIC INTERNATIONAL NORMALIZED RATIO (INR): ICD-10-CM

## 2021-12-04 LAB
INR PPP: 1.49 (ref 0.85–1.15)
PLATELET # BLD AUTO: 203 10E3/UL (ref 150–450)

## 2021-12-04 PROCEDURE — 85610 PROTHROMBIN TIME: CPT | Performed by: EMERGENCY MEDICINE

## 2021-12-04 PROCEDURE — 36415 COLL VENOUS BLD VENIPUNCTURE: CPT | Performed by: EMERGENCY MEDICINE

## 2021-12-04 PROCEDURE — 96372 THER/PROPH/DIAG INJ SC/IM: CPT | Performed by: EMERGENCY MEDICINE

## 2021-12-04 PROCEDURE — 70450 CT HEAD/BRAIN W/O DYE: CPT

## 2021-12-04 PROCEDURE — 250N000011 HC RX IP 250 OP 636: Performed by: EMERGENCY MEDICINE

## 2021-12-04 PROCEDURE — 99284 EMERGENCY DEPT VISIT MOD MDM: CPT | Mod: 25

## 2021-12-04 PROCEDURE — 85049 AUTOMATED PLATELET COUNT: CPT | Performed by: EMERGENCY MEDICINE

## 2021-12-04 RX ADMIN — ENOXAPARIN SODIUM 90 MG: 100 INJECTION SUBCUTANEOUS at 18:39

## 2021-12-04 ASSESSMENT — MIFFLIN-ST. JEOR: SCORE: 1644.83

## 2021-12-04 ASSESSMENT — ENCOUNTER SYMPTOMS
HEADACHES: 1
WOUND: 1
WEAKNESS: 0
NUMBNESS: 0

## 2021-12-04 NOTE — ED PROVIDER NOTES
"  History   Chief Complaint:  Headache and Head Injury     The history is provided by the patient.      Francisco Williamson is a 71 year old male, anticoagulated on Coumadin, with history of prostate cancer, first degree heart block, aortic valve disease, CAD, hypertension, hyperlipidemia, and subdural hematoma who presents with headache and head injury. On Monday, approximately 6 days ago, the patient banged his head on an overhead pipe in his basement. He hit the pipe pretty hard and sustained a laceration to the frontal left scalp. This morning, he developed a headache which has improved now. Gradually throughout the week, he has developed a fogginess and vision change to his left eye (he later reported that this fogginess had occurred even prior to head injury and has been slow/gradual in onset). He denies any numbness and weakness.     He does have a mechanical valve and his INR goal is between 2 and 3. His INR was checked on Monday, the same day he hit his head, and was a 2.7.     To note: the patient fell while he was in [place] in 2018. He was asymptomatic following the fall for about 7 days before he developed the \"worst headache he has ever had in his life.\" He went to the hospital there where they found bleeding in his brain and performed a craniotomy. He also has chronic vision loss in the upper left visual field from a stroke 12 years ago.     Review of Systems   Eyes: Positive for visual disturbance.   Respiratory: Negative for shortness of breath.    Cardiovascular: Negative for chest pain.   Gastrointestinal: Negative for abdominal pain.   Skin: Positive for wound.   Neurological: Positive for headaches. Negative for weakness and numbness.   All other systems reviewed and are negative.    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 rotator cuff repair)  Repair esophageal stricture     Family History:    Father: heart murmur, mitral valve disease-Rheumatic valve disease  Mother: Alzheimer disease  Sister: bipolar disorder  Brother: prostate cancer    Social History:  PCP: Regan Maurice  Presents with his wife  No history of smoking  Daily alcohol use    Physical Exam     Patient Vitals for the past 24 hrs:   BP Temp Temp src Pulse Resp SpO2 Height Weight   12/04/21 1545 (!) 141/108 -- -- 80 -- 97 % -- --   12/04/21 1409 (!) 151/104 97.1  F (36.2  C) Oral -- 14 96 % -- --   12/04/21 1342 (!) 174/88 97.4  F (36.3  C) Temporal 84 18 100 % 1.829 m (6') 86.2 kg (190 lb)       Physical Exam  General: Alert and cooperative with exam. Patient in mild distress. Normal mentation.  Head:  Scalp with healing superficial laceration to his left frontal scalp. Chronic garth deformity secondary to previous craniotomy.   Eyes:  No scleral icterus, PERRL, EOMI   ENT:  The external nose and ears are normal. The oropharynx is normal and without erythema; mucus membranes are moist. Uvula midline, no evidence of deep space infection.   Neck:  Normal range of motion without rigidity.  CV:  Regular rate and rhythm    Heart sounds consistent with mechanical heart  valve  Resp:  Breath sounds are clear bilaterally    Non-labored, no retractions or accessory muscle use  GI:  Abdomen is soft, no distension, no tenderness. No peritoneal signs  MS:  No lower extremity edema   Skin:  Warm and dry, No rash or lesions noted.  Neuro: Oriented x 3. No gross motor deficits.    Strength and sensation grossly intact in all 4 extremities.      Cranial nerves 2-12 intact.    GCS: 15    Chronic visual deficit to the left upper visual field    Emergency Department Course     Imaging:  Head CT w/o contrast   Final Result   IMPRESSION:   1.  No acute intracranial process.   2.  Progression of moderate chronic small vessel ischemic disease and mild to moderate generalized brain parenchymal volume loss since 08/08/2015.   3.  New multifocal regions of encephalomalacia underlying the right frontoparietal craniotomy defect and within the right orbitofrontal region.   4.  Stable chronic region of infarction within the right posterior cerebral artery territory.        Report per radiology    Laboratory:  Labs Ordered and Resulted from Time of ED Arrival to Time of ED Departure   INR - Abnormal       Result Value    INR 1.49 (*)    PLATELET COUNT - Normal    Platelet Count 203        Emergency Department Course:    Reviewed:  I reviewed nursing notes, vitals, past medical history and Care Everywhere    Assessments:  1404 I obtained history and examined the patient as noted above.   1644 I rechecked the patient and explained findings.   1832 I rechecked the patient once more and updated him on the plan prior to his discharge.     Interventions:  1839 Lovenox 90 mg subcutaneous    Disposition:  The patient was discharged to home.     Impression & Plan     CMS Diagnoses: None    Medical Decision Making:  Patient is a 71-year-old male with history of mechanical heart valve (anticoagulated on Coumadin) who presents with headache (currently resolved) after head injury 6 days ago.  Patient's medical history  and records reviewed.  On evaluation patient is at his neurologic baseline.  Head CT obtained and shows no evidence of acute pathology; see complete results above.  Patient's INR was found to be subtherapeutic.  Given his mechanical heart valve, patient will be bridged on Lovenox (provided first dose in the ED).  Recommended increasing warfarin dosing to 10 mg daily for the next 2 days and then resuming normal dosing.  Additionally recommended follow-up in 2 days for recheck of INR.  Additionally patient had noted some chronic worsening blurred vision in his left eye; recommended close follow-up with ophthalmology.  Return precautions were discussed.  Patient was discharged home.  At the time of discharge patient was well-appearing and without complaint.    Diagnosis:    ICD-10-CM    1. Subtherapeutic international normalized ratio (INR)  R79.1    2. Injury of head, initial encounter  S09.90XA      Discharge Medications:  Discharge Medication List as of 12/4/2021  6:36 PM      START taking these medications    Details   enoxaparin ANTICOAGULANT (LOVENOX) 80 MG/0.8ML syringe Inject 0.9 mLs (90 mg) Subcutaneous every 12 hours for 3 days, Disp-9.6 mL, R-0, E-Prescribe           Scribe Disclosure:  Emelina PALUMBO, am serving as a scribe at 2:04 PM on 12/4/2021 to document services personally performed by Warren Gleason DO based on my observations and the provider's statements to me.          Warren Gleason DO  12/05/21 0010

## 2021-12-04 NOTE — ED TRIAGE NOTES
Bumped left side of head to a metal pipe while standing up on Monday, did not get evaluated. Today waking up with headache and blurred vision feeling similar to previous fall happened in [place] 2018 and required craniotomy. Denies of LOC, on Coumadin.

## 2021-12-04 NOTE — ED NOTES
"updated pt wait; pt seems frusterated about wait \"I could have just scheduled an appoinment with my nerologist across the street\"   "

## 2021-12-05 ASSESSMENT — ENCOUNTER SYMPTOMS
SHORTNESS OF BREATH: 0
ABDOMINAL PAIN: 0

## 2021-12-05 NOTE — ED NOTES
Patient was very unhappy about lengthy ER stay for evaluation today, staff explained about busy ER flow with many critical patient today causing the delay.   Offered crackers and juices to make up for missed meals. Patient liked the idea. Platelet level drawn and sent, Lovenox pending platelet levels.

## 2021-12-05 NOTE — DISCHARGE INSTRUCTIONS
Take 10 mg Warfarin today and tomorrow then resume normal dosing.    Take lovenox twice daily until follow-up with coagulation check on Monday.

## 2022-03-24 ENCOUNTER — TELEPHONE (OUTPATIENT)
Dept: CARDIOLOGY | Facility: CLINIC | Age: 72
End: 2022-03-24
Payer: COMMERCIAL

## 2022-03-24 DIAGNOSIS — I10 BENIGN ESSENTIAL HYPERTENSION: Primary | ICD-10-CM

## 2022-03-24 NOTE — TELEPHONE ENCOUNTER
[facility] Call Center    Phone Message    May a detailed message be left on voicemail: yes     Reason for Call: Other: Pt missed a call to schedule a ct angio scan and would like a call back to schedule     Action Taken: Message routed to:  [facility] Cardio    Travel Screening: Not Applicable

## 2022-03-30 ENCOUNTER — TELEPHONE (OUTPATIENT)
Dept: CARDIOLOGY | Facility: CLINIC | Age: 72
End: 2022-03-30

## 2022-03-30 ENCOUNTER — HOSPITAL ENCOUNTER (OUTPATIENT)
Dept: CT IMAGING | Facility: CLINIC | Age: 72
Discharge: HOME | End: 2022-03-30
Attending: INTERNAL MEDICINE | Admitting: INTERNAL MEDICINE
Payer: COMMERCIAL

## 2022-03-30 DIAGNOSIS — I35.9 AORTIC VALVE DISORDER: ICD-10-CM

## 2022-03-30 DIAGNOSIS — I71.21 ANEURYSM OF ASCENDING AORTA (H): ICD-10-CM

## 2022-03-30 LAB
CREAT BLD-MCNC: 1.2 MG/DL (ref 0.7–1.3)
GFR SERPL CREATININE-BSD FRML MDRD: >60 ML/MIN/1.73M2

## 2022-03-30 PROCEDURE — 71275 CT ANGIOGRAPHY CHEST: CPT

## 2022-03-30 PROCEDURE — 82565 ASSAY OF CREATININE: CPT

## 2022-03-30 PROCEDURE — 250N000009 HC RX 250: Performed by: INTERNAL MEDICINE

## 2022-03-30 PROCEDURE — 250N000011 HC RX IP 250 OP 636: Performed by: INTERNAL MEDICINE

## 2022-03-30 RX ORDER — IOPAMIDOL 755 MG/ML
80 INJECTION, SOLUTION INTRAVASCULAR ONCE
Status: COMPLETED | OUTPATIENT
Start: 2022-03-30 | End: 2022-03-30

## 2022-03-30 RX ADMIN — IOPAMIDOL 80 ML: 755 INJECTION, SOLUTION INTRAVENOUS at 08:36

## 2022-03-30 RX ADMIN — SODIUM CHLORIDE 80 ML: 9 INJECTION, SOLUTION INTRAVENOUS at 08:36

## 2022-03-30 NOTE — TELEPHONE ENCOUNTER
[facility] Call Center    Phone Message    May a detailed message be left on voicemail: yes     Reason for Call: Other: Patient called and spoke with writer, he states he is to r/s his appt with , he would like to know why. Please call patient to discuss further and extend echo orders and provider orders     Action Taken: Message routed to:  [facility] ([facility] Cardio     Travel Screening: Not Applicable

## 2022-03-31 NOTE — TELEPHONE ENCOUNTER
Called pt he said he was told he had to move his Office visit with DR Pedro, and his echo was canceled, and he says there had been an appointment made for him in [place] that he canceled thru my chart.  Did check chart and appointment with DR Pedro still exists 4/12, but do see 4/14 open for orphans from 4/12 , but transfers done yet. Have message scheduling asking about appointments and rescheduling echo. JENS Barros RN

## 2022-04-06 ENCOUNTER — HOSPITAL ENCOUNTER (OUTPATIENT)
Dept: CARDIOLOGY | Facility: CLINIC | Age: 72
Discharge: HOME | End: 2022-04-06
Attending: INTERNAL MEDICINE | Admitting: INTERNAL MEDICINE
Payer: COMMERCIAL

## 2022-04-06 DIAGNOSIS — I71.21 ANEURYSM OF ASCENDING AORTA (H): ICD-10-CM

## 2022-04-06 DIAGNOSIS — I35.9 AORTIC VALVE DISORDER: ICD-10-CM

## 2022-04-06 DIAGNOSIS — I10 BENIGN ESSENTIAL HYPERTENSION: ICD-10-CM

## 2022-04-06 LAB — LVEF ECHO: NORMAL

## 2022-04-06 PROCEDURE — 93306 TTE W/DOPPLER COMPLETE: CPT

## 2022-04-06 PROCEDURE — 93306 TTE W/DOPPLER COMPLETE: CPT | Mod: 26 | Performed by: INTERNAL MEDICINE

## 2022-04-11 ENCOUNTER — LAB (OUTPATIENT)
Dept: LAB | Facility: CLINIC | Age: 72
End: 2022-04-11
Payer: COMMERCIAL

## 2022-04-11 DIAGNOSIS — I10 BENIGN ESSENTIAL HYPERTENSION: ICD-10-CM

## 2022-04-11 DIAGNOSIS — E78.5 HYPERLIPIDEMIA: ICD-10-CM

## 2022-04-11 LAB
ANION GAP SERPL CALCULATED.3IONS-SCNC: 4 MMOL/L (ref 3–14)
BUN SERPL-MCNC: 20 MG/DL (ref 7–30)
CALCIUM SERPL-MCNC: 9 MG/DL (ref 8.5–10.1)
CHLORIDE BLD-SCNC: 100 MMOL/L (ref 94–109)
CHOLEST SERPL-MCNC: 192 MG/DL
CO2 SERPL-SCNC: 29 MMOL/L (ref 20–32)
CREAT SERPL-MCNC: 1.19 MG/DL (ref 0.66–1.25)
FASTING STATUS PATIENT QL REPORTED: YES
GFR SERPL CREATININE-BSD FRML MDRD: 64 ML/MIN/1.73M2
GLUCOSE BLD-MCNC: 87 MG/DL (ref 70–99)
HDLC SERPL-MCNC: 67 MG/DL
LDLC SERPL CALC-MCNC: 107 MG/DL
NONHDLC SERPL-MCNC: 125 MG/DL
POTASSIUM BLD-SCNC: 4 MMOL/L (ref 3.4–5.3)
SODIUM SERPL-SCNC: 133 MMOL/L (ref 133–144)
TRIGL SERPL-MCNC: 91 MG/DL

## 2022-04-11 PROCEDURE — 80061 LIPID PANEL: CPT | Performed by: INTERNAL MEDICINE

## 2022-04-11 PROCEDURE — 36415 COLL VENOUS BLD VENIPUNCTURE: CPT | Performed by: INTERNAL MEDICINE

## 2022-04-11 PROCEDURE — 80048 BASIC METABOLIC PNL TOTAL CA: CPT | Performed by: INTERNAL MEDICINE

## 2022-04-14 ENCOUNTER — OFFICE VISIT (OUTPATIENT)
Dept: CARDIOLOGY | Facility: CLINIC | Age: 72
End: 2022-04-14
Attending: INTERNAL MEDICINE
Payer: COMMERCIAL

## 2022-04-14 VITALS
SYSTOLIC BLOOD PRESSURE: 130 MMHG | WEIGHT: 198 LBS | OXYGEN SATURATION: 98 % | HEIGHT: 71 IN | BODY MASS INDEX: 27.72 KG/M2 | HEART RATE: 67 BPM | DIASTOLIC BLOOD PRESSURE: 88 MMHG

## 2022-04-14 DIAGNOSIS — I20.89 OTHER FORMS OF ANGINA PECTORIS (H): Primary | ICD-10-CM

## 2022-04-14 DIAGNOSIS — I71.21 ANEURYSM OF ASCENDING AORTA (H): ICD-10-CM

## 2022-04-14 DIAGNOSIS — I10 BENIGN ESSENTIAL HYPERTENSION: ICD-10-CM

## 2022-04-14 DIAGNOSIS — I35.9 AORTIC VALVE DISORDER: ICD-10-CM

## 2022-04-14 PROCEDURE — 99215 OFFICE O/P EST HI 40 MIN: CPT | Performed by: INTERNAL MEDICINE

## 2022-04-14 NOTE — LETTER
4/14/2022    Biju Hernandez MD  [address]  [facility] [address]    RE: Francisco PEREZ Clay       Dear Colleague,     I had the pleasure of seeing Francisco Williamson in the [facility].  HPI and Plan:   See dictation    Orders Placed This Encounter   Procedures     CT Chest Angio w/o & w Contrast     Follow-Up with Cardiology     Exercise Stress Echocardiogram     No orders of the defined types were placed in this encounter.    There are no discontinued medications.      Encounter Diagnoses   Name Primary?     Benign essential hypertension      Aortic valve disorder      Aneurysm of ascending aorta (H)      Other forms of angina pectoris (H) Yes       CURRENT MEDICATIONS:  Current Outpatient Medications   Medication Sig Dispense Refill     amLODIPine (NORVASC) 10 MG tablet Take 10 mg by mouth daily       atorvastatin (LIPITOR) 80 MG tablet Take 80 mg by mouth daily       benazepril (LOTENSIN) 10 MG tablet Take 1 tablet (10 mg) by mouth 2 times daily 180 tablet 3     cholecalciferol (VITAMIN D) 1000 UNIT tablet Take 2 tablets by mouth daily. 100 tablet prn     co-enzyme Q-10 100 MG CAPS Take 1 capsule by mouth daily. 90 capsule prn     IRON PO Take 65 mg by mouth 2 times daily       loratadine (CLARITIN) 10 MG tablet Take 10 mg by mouth daily       metoprolol succinate ER (TOPROL-XL) 100 MG 24 hr tablet Take 100 mg by mouth daily        OMEPRAZOLE PO Take 40 mg by mouth 2 times daily       senna-docusate (SENOKOT-S/PERICOLACE) 8.6-50 MG tablet Take 1 tablet by mouth 2 times daily as needed for constipation       spironolactone (ALDACTONE) 25 MG tablet Take 0.5 tablets (12.5 mg) by mouth daily 45 tablet 3     terazosin (HYTRIN) 5 MG capsule Take 5 mg by mouth At Bedtime       Warfarin Sodium (COUMADIN PO) Take 7 mg by mouth daily        melatonin 3 MG tablet Take 3 mg by mouth nightly as needed for sleep (Patient not taking: Reported on 4/14/2022)         ALLERGIES     Allergies   Allergen  Reactions     Morphine Sulfate        PAST MEDICAL HISTORY:  Past Medical History:   Diagnosis Date     Aortic valve disease     ascending aortic aneurysm , AVR 1995     CAD (coronary artery disease)     minimal on CT cor angiogram     Colitis     radiation colitis, 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     Cystic medial necrosis (H)     fibrillin test negative for marfan's     Embolic stroke (H)     L eye -when INR low, another CVA when given Vit K before prostate surgery     First degree heart block     and RBBB     Gastro-oesophageal reflux disease      Gastrointestinal bleeding, upper     EGD - GASTRIC ULCER, EROSIVE ESOPHAGITIS, NON BLEEDING ESOPHAGEAL ULCERS, NON BLEEDING GASTRIC ULCER, LILLIE REYNA TEAR     Hiatal hernia      Hyperlipidemia      Hypertension      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)     Intervertebral disk disease     cervicle and lumbar     Intervertebral disk disease     L4/5 disk herniation     Laceration     L wrist with complete severing of radial artery     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 Subdural hematoma was diagnosed on 4/20/2018 after acute neurologic decompensation, and patient was managed with right sided parietal craniectomy and frontal logan hole in [place].Transferred to [facility] 4/27 after he was stabilized. in [place]       PAST SURGICAL HISTORY:  Past Surgical History:   Procedure Laterality Date     AORTIC VALVE REPLACEMENT  1995    [facility] 27mm 27-[id number]-404 serial# [id number]: aortic  "valve replacement with composite graft with reimplanation of the coronary arteries into the graft     COLONOSCOPY       craineotomy  04/2018     closed head injury while vacationing in [place] 4/27/2018 and required emergent right subdural hematoma evacuation     H CATARACT SURGICAL PACKAGE Bilateral 2020    bilat     INSERT RADIATION SEEDS PROSTATE  3/15/2012    Procedure:INSERT RADIATION SEEDS PROSTATE; INSERT RADIATION SEEDS PROSTATE  - Paladium Seeds  110 seeds inserted; Surgeon:JONATHAN BAY; Location: [facility]     MOHS MICROGRAPHIC PROCEDURE      SCC face     ORTHOPEDIC SURGERY      LEFT ROTATOR CUFF REPAIR     REPAIR ESOPHAGEAL STRICTURE      ?clip in esophagus       FAMILY HISTORY:  Family History   Problem Relation Age of Onset     Heart Murmur Father 50        mitral valve disease-Rheumatic valve disease     Alzheimer Disease Mother 95     Bipolar Disorder Sister      Prostate Cancer Brother      Skin Cancer No family hx of        SOCIAL HISTORY:  Social History     Socioeconomic History     Marital status:      Spouse name: None     Number of children: None     Years of education: None     Highest education level: None   Occupational History     Occupation: retired     Comment: prior medical device exec   Tobacco Use     Smoking status: Never Smoker     Smokeless tobacco: Never Used   Substance and Sexual Activity     Alcohol use: Yes     Comment: up to 2 beers/day     Drug use: No   Other Topics Concern     Special Diet No     Exercise No       Review of Systems:  Skin:  Negative     Eyes:  Positive for    ENT:  Negative    Respiratory:  Negative    Cardiovascular:  Negative Positive for  Gastroenterology: Negative    Genitourinary:  Negative prostate problem  Musculoskeletal:  Negative arthritis  Neurologic:  Positive for stroke  Psychiatric:  Negative    Heme/Lymph/Imm:  Negative    Endocrine:  Negative      Physical Exam:  Vitals: /88   Pulse 67   Ht 1.803 m (5' 11\")   Wt 89.8 kg (198 lb)  "  SpO2 98%   BMI 27.62 kg/m      Constitutional:  cooperative, alert and oriented, well developed, well nourished, in no acute distress        Skin:  warm and dry to the touch, no apparent skin lesions or masses noted          Head:  normocephalic, no masses or lesions        Eyes:  pupils equal and round, conjunctivae and lids unremarkable, sclera white, no xanthalasma, EOMS intact, no nystagmus        Lymph:No Cervical lymphadenopathy present;No thyromegaly     ENT:           Neck:  carotid pulses are full and equal bilaterally, JVP normal, no carotid bruit transmitted murmur      Respiratory:  normal breath sounds, clear to auscultation, normal A-P diameter, normal symmetry, normal respiratory excursion, no use of accessory muscles         Cardiac: regular rhythm occasional premature beats   crisp prosthetic valve sounds systolic ejection murmur;grade 1        pulses full and equal, no bruits auscultated                                        GI:  abdomen soft, non-tender, BS normoactive, no mass, no HSM, no bruits        Extremities and Muscular Skeletal:  no deformities, clubbing, cyanosis, erythema observed;no edema              Neurological:  no gross motor deficits        Psych:  Alert and Oriented x 3      Recent Lab Results:  LIPID RESULTS:  Lab Results   Component Value Date    CHOL 192 04/11/2022    CHOL 171 03/03/2021    HDL 67 04/11/2022    HDL 64 03/03/2021     (H) 04/11/2022    LDL 89 03/03/2021    TRIG 91 04/11/2022    TRIG 97 03/03/2021    CHOLHDLRATIO 2.6 08/09/2012       LIVER ENZYME RESULTS:  Lab Results   Component Value Date    AST 23 03/03/2021    ALT 19 03/03/2021       CBC RESULTS:  Lab Results   Component Value Date    WBC 4.8 11/16/2017    RBC 4.43 11/16/2017    HGB 13.6 11/16/2017    HCT 42.6 11/16/2017    MCV 96.3 11/16/2017    MCH 30.6 11/16/2017    MCHC 31.8 11/16/2017    RDW 15.8 11/16/2017     12/04/2021     11/16/2017       BMP RESULTS:  Lab Results    Component Value Date     04/11/2022     06/29/2021    POTASSIUM 4.0 04/11/2022    POTASSIUM 4.2 06/29/2021    CHLORIDE 100 04/11/2022    CHLORIDE 102 06/29/2021    CO2 29 04/11/2022    CO2 30 06/29/2021    ANIONGAP 4 04/11/2022    ANIONGAP 3 06/29/2021    GLC 87 04/11/2022    GLC 90 06/29/2021    BUN 20 04/11/2022    BUN 17 06/29/2021    CR 1.19 04/11/2022    CR 1.16 06/29/2021    GFRESTIMATED 64 04/11/2022    GFRESTIMATED >60 03/30/2022    GFRESTIMATED 62 06/29/2021    GFRESTBLACK 72 06/29/2021    SACHA 9.0 04/11/2022    SACHA 8.7 06/29/2021        A1C RESULTS:  No results found for: A1C    INR RESULTS:  Lab Results   Component Value Date    INR 1.49 (H) 12/04/2021    INR 2.31 (H) 08/08/2015    INR 2.59 (H) 06/23/2012           CC  Jeff Pedro MD  [address]  [place] [address]    Thank you for allowing me to participate in the care of your patient.      Sincerely,     Jeff Pedro MD     [facility]

## 2022-04-14 NOTE — PROGRESS NOTES
Service Date: 04/14/2022    HISTORY OF PRESENT ILLNESS:  Francisco Williamson returns for followup.  He is a 71-year-old gentleman.  He is a most kind gentleman.  He has been a patient of ours for decades.  He had aortic valve disease with ascending aortic aneurysm.  He had surgery with a mechanical valve and conduit in 1995.  It was a [employer] 27 mm device.  The composite graft with reimplantation of the coronary arteries went up to, but not through the arch.  This is an important point because we have been following him and his CAT scan in 2018 showed that the transverse arch was 45 mm.  They did not give us more detail than that.  The current one they measured at its maximum 49 x 47 mm, so over 4 years this possibly represents dilatation.  The conduit portion itself is normal and the descending aorta is essentially normal in size.      We also reviewed that it showed calcified coronary arteries.  The patient did not have any significant coronary disease back when he had his surgery in 1995.  The rest of the echo shows that there is mild to moderate MR.  In 2018, there was moderate MR also, but in 2016 there was only mild MR.  He has a progressive atrial enlargement, but maintained sinus rhythm.      His mechanical valve is showing a mean gradient of 28 with a valve area of 1.2, which is higher than I would expect, but even going back to 2016 before it was 23 mm gradient with a 1.1 valve area and actually there was an echo before that, which was probably an error, that talked about a mean gradient of 34, so although the gradient seems a little bit high for this valve, it really has not changed much.      We actually did fluoroscopy of the valve a number of years ago and it had a normal opening and closing angle.      The patient's LDL cholesterol is slightly high at 107.  He is on maximum dose atorvastatin, so we could potentially make a case for adding Zetia to get his LDL closer to 70 because now we see  coronary calcification, but first I want to talk about his symptoms.  He reports when he was shoveling snow, which is obviously several weeks ago, he had left chest tightness that lasted for many hours on end and then went away.  He states he has not had it since then.  I am going to get a stress echocardiogram just to make sure this does not represent cardiac ischemia.      His blood pressure is seemingly controlled now, so I am going to keep him on the beta blocker for the stress test, even though he may not reach target heart rate because I am concerned with his history of hypertension, if I stop the beta blocker, he will have a blood pressure spike during the stress test and they will stop it prematurely.      At this juncture, I think I am going to get a CT scan next year to make sure that this is not more rapidly dilating ascending aorta.  If so, we would have to have him see the cardiac surgeon.  This will be a more complex case because it is involving the arch with the head vessels.      With regard to the valve, I think it is overall stable at the aortic valve and we will keep our eye and moderate mitral regurgitation.  Clinically, he reports no heart failure symptoms.  He is active.  No fluid retention.  No shortness of breath and only the one episode of chest pain.    Today's visit was 45 minutes.    cc:   Biju Hernandez MD   [facility]  [id number]  [place] [address]     Jeff Pedro MD        D: 2022   T: 2022   MT: SANDEEP    Name:     DEN HARKINS  MRN:      [id number]        Account:      [id number]   :      1950           Service Date: 2022       Document: [id number]

## 2022-04-14 NOTE — PROGRESS NOTES
HPI and Plan:   See dictation    Orders Placed This Encounter   Procedures     CT Chest Angio w/o & w Contrast     Follow-Up with Cardiology     Exercise Stress Echocardiogram     No orders of the defined types were placed in this encounter.    There are no discontinued medications.      Encounter Diagnoses   Name Primary?     Benign essential hypertension      Aortic valve disorder      Aneurysm of ascending aorta (H)      Other forms of angina pectoris (H) Yes       CURRENT MEDICATIONS:  Current Outpatient Medications   Medication Sig Dispense Refill     amLODIPine (NORVASC) 10 MG tablet Take 10 mg by mouth daily       atorvastatin (LIPITOR) 80 MG tablet Take 80 mg by mouth daily       benazepril (LOTENSIN) 10 MG tablet Take 1 tablet (10 mg) by mouth 2 times daily 180 tablet 3     cholecalciferol (VITAMIN D) 1000 UNIT tablet Take 2 tablets by mouth daily. 100 tablet prn     co-enzyme Q-10 100 MG CAPS Take 1 capsule by mouth daily. 90 capsule prn     IRON PO Take 65 mg by mouth 2 times daily       loratadine (CLARITIN) 10 MG tablet Take 10 mg by mouth daily       metoprolol succinate ER (TOPROL-XL) 100 MG 24 hr tablet Take 100 mg by mouth daily        OMEPRAZOLE PO Take 40 mg by mouth 2 times daily       senna-docusate (SENOKOT-S/PERICOLACE) 8.6-50 MG tablet Take 1 tablet by mouth 2 times daily as needed for constipation       spironolactone (ALDACTONE) 25 MG tablet Take 0.5 tablets (12.5 mg) by mouth daily 45 tablet 3     terazosin (HYTRIN) 5 MG capsule Take 5 mg by mouth At Bedtime       Warfarin Sodium (COUMADIN PO) Take 7 mg by mouth daily        melatonin 3 MG tablet Take 3 mg by mouth nightly as needed for sleep (Patient not taking: Reported on 4/14/2022)         ALLERGIES     Allergies   Allergen Reactions     Morphine Sulfate        PAST MEDICAL HISTORY:  Past Medical History:   Diagnosis Date     Aortic valve disease     ascending aortic aneurysm , AVR 1995     CAD (coronary artery disease)     minimal on  CT cor angiogram     Colitis     radiation colitis, 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     Cystic medial necrosis (H)     fibrillin test negative for marfan's     Embolic stroke (H)     L eye -when INR low, another CVA when given Vit K before prostate surgery     First degree heart block     and RBBB     Gastro-oesophageal reflux disease      Gastrointestinal bleeding, upper     EGD - GASTRIC ULCER, EROSIVE ESOPHAGITIS, NON BLEEDING ESOPHAGEAL ULCERS, NON BLEEDING GASTRIC ULCER, LILLIE REYNA TEAR     Hiatal hernia      Hyperlipidemia      Hypertension      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)     Intervertebral disk disease     cervicle and lumbar     Intervertebral disk disease     L4/5 disk herniation     Laceration     L wrist with complete severing of radial artery     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 Subdural hematoma was diagnosed on 4/20/2018 after acute neurologic decompensation, and patient was managed with right sided parietal craniectomy and frontal logan hole in [place].Transferred to [facility] 4/27 after he was stabilized. in [place]       PAST SURGICAL HISTORY:  Past Surgical History:   Procedure Laterality Date     AORTIC VALVE REPLACEMENT  1995    [facility] 27mm 27-CAVG-404 serial# [id number]: aortic valve replacement with composite graft with reimplanation of the coronary arteries into the graft     COLONOSCOPY       craineotomy  04/2018     closed head injury while vacationing in [place] 4/27/2018 and required  "emergent right subdural hematoma evacuation     H CATARACT SURGICAL PACKAGE Bilateral 2020    bilat     INSERT RADIATION SEEDS PROSTATE  3/15/2012    Procedure:INSERT RADIATION SEEDS PROSTATE; INSERT RADIATION SEEDS PROSTATE  - Paladium Seeds  110 seeds inserted; Surgeon:JONATHAN BAY; Location: [facility]     MOHS MICROGRAPHIC PROCEDURE      SCC face     ORTHOPEDIC SURGERY      LEFT ROTATOR CUFF REPAIR     REPAIR ESOPHAGEAL STRICTURE      ?clip in esophagus       FAMILY HISTORY:  Family History   Problem Relation Age of Onset     Heart Murmur Father 50        mitral valve disease-Rheumatic valve disease     Alzheimer Disease Mother 95     Bipolar Disorder Sister      Prostate Cancer Brother      Skin Cancer No family hx of        SOCIAL HISTORY:  Social History     Socioeconomic History     Marital status:      Spouse name: None     Number of children: None     Years of education: None     Highest education level: None   Occupational History     Occupation: retired     Comment: prior medical device exec   Tobacco Use     Smoking status: Never Smoker     Smokeless tobacco: Never Used   Substance and Sexual Activity     Alcohol use: Yes     Comment: up to 2 beers/day     Drug use: No   Other Topics Concern     Special Diet No     Exercise No       Review of Systems:  Skin:  Negative     Eyes:  Positive for    ENT:  Negative    Respiratory:  Negative    Cardiovascular:  Negative Positive for  Gastroenterology: Negative    Genitourinary:  Negative prostate problem  Musculoskeletal:  Negative arthritis  Neurologic:  Positive for stroke  Psychiatric:  Negative    Heme/Lymph/Imm:  Negative    Endocrine:  Negative      Physical Exam:  Vitals: /88   Pulse 67   Ht 1.803 m (5' 11\")   Wt 89.8 kg (198 lb)   SpO2 98%   BMI 27.62 kg/m      Constitutional:  cooperative, alert and oriented, well developed, well nourished, in no acute distress        Skin:  warm and dry to the touch, no apparent skin lesions or masses " noted          Head:  normocephalic, no masses or lesions        Eyes:  pupils equal and round, conjunctivae and lids unremarkable, sclera white, no xanthalasma, EOMS intact, no nystagmus        Lymph:No Cervical lymphadenopathy present;No thyromegaly     ENT:           Neck:  carotid pulses are full and equal bilaterally, JVP normal, no carotid bruit transmitted murmur      Respiratory:  normal breath sounds, clear to auscultation, normal A-P diameter, normal symmetry, normal respiratory excursion, no use of accessory muscles         Cardiac: regular rhythm occasional premature beats   crisp prosthetic valve sounds systolic ejection murmur;grade 1        pulses full and equal, no bruits auscultated                                        GI:  abdomen soft, non-tender, BS normoactive, no mass, no HSM, no bruits        Extremities and Muscular Skeletal:  no deformities, clubbing, cyanosis, erythema observed;no edema              Neurological:  no gross motor deficits        Psych:  Alert and Oriented x 3      Recent Lab Results:  LIPID RESULTS:  Lab Results   Component Value Date    CHOL 192 04/11/2022    CHOL 171 03/03/2021    HDL 67 04/11/2022    HDL 64 03/03/2021     (H) 04/11/2022    LDL 89 03/03/2021    TRIG 91 04/11/2022    TRIG 97 03/03/2021    CHOLHDLRATIO 2.6 08/09/2012       LIVER ENZYME RESULTS:  Lab Results   Component Value Date    AST 23 03/03/2021    ALT 19 03/03/2021       CBC RESULTS:  Lab Results   Component Value Date    WBC 4.8 11/16/2017    RBC 4.43 11/16/2017    HGB 13.6 11/16/2017    HCT 42.6 11/16/2017    MCV 96.3 11/16/2017    MCH 30.6 11/16/2017    MCHC 31.8 11/16/2017    RDW 15.8 11/16/2017     12/04/2021     11/16/2017       BMP RESULTS:  Lab Results   Component Value Date     04/11/2022     06/29/2021    POTASSIUM 4.0 04/11/2022    POTASSIUM 4.2 06/29/2021    CHLORIDE 100 04/11/2022    CHLORIDE 102 06/29/2021    CO2 29 04/11/2022    CO2 30 06/29/2021     ANIONGAP 4 04/11/2022    ANIONGAP 3 06/29/2021    GLC 87 04/11/2022    GLC 90 06/29/2021    BUN 20 04/11/2022    BUN 17 06/29/2021    CR 1.19 04/11/2022    CR 1.16 06/29/2021    GFRESTIMATED 64 04/11/2022    GFRESTIMATED >60 03/30/2022    GFRESTIMATED 62 06/29/2021    GFRESTBLACK 72 06/29/2021    SACHA 9.0 04/11/2022    SACHA 8.7 06/29/2021        A1C RESULTS:  No results found for: A1C    INR RESULTS:  Lab Results   Component Value Date    INR 1.49 (H) 12/04/2021    INR 2.31 (H) 08/08/2015    INR 2.59 (H) 06/23/2012           CC  Jeff Pedro MD  [address]  [place] [address]

## 2022-05-06 ENCOUNTER — HOSPITAL ENCOUNTER (OUTPATIENT)
Dept: CARDIOLOGY | Facility: CLINIC | Age: 72
Discharge: HOME | End: 2022-05-06
Attending: INTERNAL MEDICINE | Admitting: INTERNAL MEDICINE
Payer: COMMERCIAL

## 2022-05-06 DIAGNOSIS — I20.89 OTHER FORMS OF ANGINA PECTORIS (H): ICD-10-CM

## 2022-05-06 DIAGNOSIS — I71.21 ANEURYSM OF ASCENDING AORTA (H): ICD-10-CM

## 2022-05-06 DIAGNOSIS — I10 BENIGN ESSENTIAL HYPERTENSION: ICD-10-CM

## 2022-05-06 DIAGNOSIS — I35.9 AORTIC VALVE DISORDER: ICD-10-CM

## 2022-05-06 PROCEDURE — 93321 DOPPLER ECHO F-UP/LMTD STD: CPT | Mod: TC

## 2022-05-06 PROCEDURE — 93018 CV STRESS TEST I&R ONLY: CPT | Performed by: INTERNAL MEDICINE

## 2022-05-06 PROCEDURE — 93350 STRESS TTE ONLY: CPT | Mod: 26 | Performed by: INTERNAL MEDICINE

## 2022-05-06 PROCEDURE — 93321 DOPPLER ECHO F-UP/LMTD STD: CPT | Mod: 26 | Performed by: INTERNAL MEDICINE

## 2022-05-06 PROCEDURE — 93016 CV STRESS TEST SUPVJ ONLY: CPT | Performed by: INTERNAL MEDICINE

## 2022-05-06 PROCEDURE — 93350 STRESS TTE ONLY: CPT | Mod: TC

## 2022-05-06 PROCEDURE — 93325 DOPPLER ECHO COLOR FLOW MAPG: CPT | Mod: 26 | Performed by: INTERNAL MEDICINE

## 2022-05-09 ENCOUNTER — TELEPHONE (OUTPATIENT)
Dept: CARDIOLOGY | Facility: CLINIC | Age: 72
End: 2022-05-09
Payer: COMMERCIAL

## 2022-05-09 NOTE — TELEPHONE ENCOUNTER
Stress test results post Office visit:  ______________________________________________________________________________  Interpretation Summary     1. Nondiagnostic stress exercise echocardiogram, due to inability to achieve  target heart rate (patient on beta-blocker).  2. Patient exercised for 5:30 minutes on the Jorge protocol achieving a  maximal heart rate of 104 bpm (71% of maximal predicted).  3. Normal blood pressure response to exercise.  4. Below average exercise capacity (7.0 METS).  5. Frequent premature ventricular complexes at rest and recovery, improved  with exercise. No sustained arrhythmias.     Rest of findings per recent complete transthoracic echocardiogram dated  4/6/2022.

## 2022-05-12 NOTE — TELEPHONE ENCOUNTER
Normally I would decrease toprol to 50 due to low HR but he still has HTN despite 4 bp meds and he has aorta dilatation  Order lexiscan stress test (on bb) any time in next few months  Ask him to track his bp and we want resting sbp <135 ideally  He can an NP visit if question of bp control and lexiscan result

## 2022-05-12 NOTE — TELEPHONE ENCOUNTER
Called Pt and reviewed previous stress test did show HTN, and was on Beta blocker for test. Did review DR Pedro wanting to do Lexiscan, and Pt said he did not want to pay co pay for another test. Did discuss BP's are more elevated than DR would like Pt said he cannot function with them any lower and will faint if BP any less than 130-135 systolic, and says he has fainted because of it. Pt said he did not want further testing and felt his BP's are adequate. Informed Pt would let DR Pedro know his response. JENS Barros RN

## 2022-05-18 ENCOUNTER — TELEPHONE (OUTPATIENT)
Dept: CARDIOLOGY | Facility: CLINIC | Age: 72
End: 2022-05-18
Payer: COMMERCIAL

## 2022-05-18 DIAGNOSIS — I10 BENIGN ESSENTIAL HYPERTENSION: ICD-10-CM

## 2022-05-18 RX ORDER — SPIRONOLACTONE 25 MG/1
12.5 TABLET ORAL DAILY
Qty: 45 TABLET | Refills: 3 | Status: SHIPPED | OUTPATIENT
Start: 2022-05-18 | End: 2022-05-23

## 2022-05-20 ENCOUNTER — TELEPHONE (OUTPATIENT)
Dept: CARDIOLOGY | Facility: CLINIC | Age: 72
End: 2022-05-20
Payer: COMMERCIAL

## 2022-05-20 DIAGNOSIS — I10 BENIGN ESSENTIAL HYPERTENSION: Primary | ICD-10-CM

## 2022-05-20 NOTE — TELEPHONE ENCOUNTER
Patient called today and left detailed VM that he picked up his rx for spironolactone 12.5 mg daily and decided to take a whole pill (25mg) daily instead of a half of a pill (12.5mg) daily. Patient advised in VM that his BP has been in the 120's/80's since doing this and he would like his prescription changed to this new dose. RN will send to Dr. Pedro for review and to confirm if he is ok with plan.

## 2022-05-23 RX ORDER — SPIRONOLACTONE 25 MG/1
25 TABLET ORAL DAILY
Qty: 1 TABLET | Refills: 0 | COMMUNITY
Start: 2022-05-23 | End: 2022-08-08

## 2022-05-23 NOTE — TELEPHONE ENCOUNTER
Med list updated to reflect change. Order placed for BMP. RN called patient and left detailed VM with Dr. Pedro recommendations along with our scheduling phone number. RN advised patient in VM to call back with any questions/concerns.

## 2022-06-01 ENCOUNTER — LAB (OUTPATIENT)
Dept: LAB | Facility: CLINIC | Age: 72
End: 2022-06-01
Payer: COMMERCIAL

## 2022-06-01 DIAGNOSIS — I10 BENIGN ESSENTIAL HYPERTENSION: ICD-10-CM

## 2022-06-01 LAB
ANION GAP SERPL CALCULATED.3IONS-SCNC: 6 MMOL/L (ref 3–14)
BUN SERPL-MCNC: 17 MG/DL (ref 7–30)
CALCIUM SERPL-MCNC: 8.3 MG/DL (ref 8.5–10.1)
CHLORIDE BLD-SCNC: 101 MMOL/L (ref 94–109)
CO2 SERPL-SCNC: 28 MMOL/L (ref 20–32)
CREAT SERPL-MCNC: 1.07 MG/DL (ref 0.66–1.25)
GFR SERPL CREATININE-BSD FRML MDRD: 73 ML/MIN/1.73M2
GLUCOSE BLD-MCNC: 88 MG/DL (ref 70–99)
POTASSIUM BLD-SCNC: 4.5 MMOL/L (ref 3.4–5.3)
SODIUM SERPL-SCNC: 135 MMOL/L (ref 133–144)

## 2022-06-01 PROCEDURE — 80048 BASIC METABOLIC PNL TOTAL CA: CPT | Performed by: INTERNAL MEDICINE

## 2022-06-01 PROCEDURE — 36415 COLL VENOUS BLD VENIPUNCTURE: CPT | Performed by: INTERNAL MEDICINE

## 2022-06-12 ENCOUNTER — HEALTH MAINTENANCE LETTER (OUTPATIENT)
Age: 72
End: 2022-06-12

## 2022-07-11 DIAGNOSIS — I10 BENIGN ESSENTIAL HYPERTENSION: ICD-10-CM

## 2022-07-11 RX ORDER — BENAZEPRIL HYDROCHLORIDE 10 MG/1
10 TABLET ORAL 2 TIMES DAILY
Qty: 180 TABLET | Refills: 3 | Status: SHIPPED | OUTPATIENT
Start: 2022-07-11 | End: 2023-07-11

## 2022-08-08 DIAGNOSIS — I10 BENIGN ESSENTIAL HYPERTENSION: ICD-10-CM

## 2022-08-08 RX ORDER — SPIRONOLACTONE 25 MG/1
TABLET ORAL
Qty: 45 TABLET | Refills: 3 | Status: SHIPPED | OUTPATIENT
Start: 2022-08-08 | End: 2023-06-28

## 2022-08-08 RX ORDER — SPIRONOLACTONE 25 MG/1
25 TABLET ORAL DAILY
Qty: 90 TABLET | Refills: 2 | Status: SHIPPED | OUTPATIENT
Start: 2022-08-08 | End: 2022-08-08

## 2022-08-08 NOTE — PROGRESS NOTES
[facility] Refill Guideline reviewed.  Medication meets criteria for refill. JENS Barros RN

## 2022-08-08 NOTE — PROGRESS NOTES
Called Pt to verify dosing of spironolactone. Pt says he has gone back to 1/2 tablet a day or 12.5 mg. JENS Barros RN

## 2022-10-23 ENCOUNTER — HEALTH MAINTENANCE LETTER (OUTPATIENT)
Age: 72
End: 2022-10-23

## 2023-05-30 ENCOUNTER — ANCILLARY PROCEDURE (OUTPATIENT)
Dept: ULTRASOUND IMAGING | Facility: CLINIC | Age: 73
End: 2023-05-30
Attending: UROLOGY
Payer: COMMERCIAL

## 2023-05-30 DIAGNOSIS — N28.1 CYST OF KIDNEY, ACQUIRED: ICD-10-CM

## 2023-05-30 PROCEDURE — 76770 US EXAM ABDO BACK WALL COMP: CPT

## 2023-06-18 ENCOUNTER — HEALTH MAINTENANCE LETTER (OUTPATIENT)
Age: 73
End: 2023-06-18

## 2023-06-23 ENCOUNTER — HOSPITAL ENCOUNTER (OUTPATIENT)
Dept: CARDIOLOGY | Facility: CLINIC | Age: 73
Discharge: HOME | End: 2023-06-23
Attending: INTERNAL MEDICINE | Admitting: INTERNAL MEDICINE
Payer: COMMERCIAL

## 2023-06-23 VITALS — SYSTOLIC BLOOD PRESSURE: 153 MMHG | HEART RATE: 60 BPM | DIASTOLIC BLOOD PRESSURE: 87 MMHG

## 2023-06-23 DIAGNOSIS — I71.21 ANEURYSM OF ASCENDING AORTA (H): ICD-10-CM

## 2023-06-23 LAB
CREAT BLD-MCNC: 1.2 MG/DL (ref 0.7–1.3)
GFR SERPL CREATININE-BSD FRML MDRD: >60 ML/MIN/1.73M2

## 2023-06-23 PROCEDURE — 250N000011 HC RX IP 250 OP 636: Performed by: INTERNAL MEDICINE

## 2023-06-23 PROCEDURE — 82565 ASSAY OF CREATININE: CPT

## 2023-06-23 PROCEDURE — 71275 CT ANGIOGRAPHY CHEST: CPT

## 2023-06-23 RX ORDER — DIPHENHYDRAMINE HCL 25 MG
25 CAPSULE ORAL
Status: DISCONTINUED | OUTPATIENT
Start: 2023-06-23 | End: 2023-06-24 | Stop reason: HOSPADM

## 2023-06-23 RX ORDER — ACYCLOVIR 200 MG/1
0-1 CAPSULE ORAL
Status: DISCONTINUED | OUTPATIENT
Start: 2023-06-23 | End: 2023-06-24 | Stop reason: HOSPADM

## 2023-06-23 RX ORDER — IOPAMIDOL 755 MG/ML
500 INJECTION, SOLUTION INTRAVASCULAR ONCE
Status: COMPLETED | OUTPATIENT
Start: 2023-06-23 | End: 2023-06-23

## 2023-06-23 RX ORDER — DIPHENHYDRAMINE HYDROCHLORIDE 50 MG/ML
25-50 INJECTION INTRAMUSCULAR; INTRAVENOUS
Status: DISCONTINUED | OUTPATIENT
Start: 2023-06-23 | End: 2023-06-24 | Stop reason: HOSPADM

## 2023-06-23 RX ORDER — METHYLPREDNISOLONE SODIUM SUCCINATE 125 MG/2ML
125 INJECTION, POWDER, LYOPHILIZED, FOR SOLUTION INTRAMUSCULAR; INTRAVENOUS
Status: DISCONTINUED | OUTPATIENT
Start: 2023-06-23 | End: 2023-06-24 | Stop reason: HOSPADM

## 2023-06-23 RX ORDER — ONDANSETRON 2 MG/ML
4 INJECTION INTRAMUSCULAR; INTRAVENOUS
Status: DISCONTINUED | OUTPATIENT
Start: 2023-06-23 | End: 2023-06-24 | Stop reason: HOSPADM

## 2023-06-23 RX ADMIN — IOPAMIDOL 100 ML: 755 INJECTION, SOLUTION INTRAVENOUS at 09:30

## 2023-06-28 ENCOUNTER — OFFICE VISIT (OUTPATIENT)
Dept: CARDIOLOGY | Facility: CLINIC | Age: 73
End: 2023-06-28
Payer: COMMERCIAL

## 2023-06-28 VITALS
WEIGHT: 200 LBS | HEART RATE: 60 BPM | HEIGHT: 72 IN | SYSTOLIC BLOOD PRESSURE: 131 MMHG | DIASTOLIC BLOOD PRESSURE: 70 MMHG | OXYGEN SATURATION: 97 % | BODY MASS INDEX: 27.09 KG/M2

## 2023-06-28 DIAGNOSIS — I25.10 CORONARY ARTERY DISEASE INVOLVING NATIVE CORONARY ARTERY OF NATIVE HEART WITHOUT ANGINA PECTORIS: Primary | ICD-10-CM

## 2023-06-28 DIAGNOSIS — I10 BENIGN ESSENTIAL HYPERTENSION: ICD-10-CM

## 2023-06-28 DIAGNOSIS — E78.5 HYPERLIPIDEMIA LDL GOAL <70: ICD-10-CM

## 2023-06-28 PROCEDURE — 99213 OFFICE O/P EST LOW 20 MIN: CPT | Performed by: INTERNAL MEDICINE

## 2023-06-28 RX ORDER — EZETIMIBE 10 MG/1
10 TABLET ORAL DAILY
Qty: 90 TABLET | Refills: 3 | Status: SHIPPED | OUTPATIENT
Start: 2023-06-28 | End: 2024-06-25

## 2023-06-28 RX ORDER — SPIRONOLACTONE 25 MG/1
TABLET ORAL
Qty: 45 TABLET | Refills: 3 | Status: SHIPPED | OUTPATIENT
Start: 2023-06-28 | End: 2023-12-18

## 2023-06-28 NOTE — PROGRESS NOTES
Service Date: 06/28/2023    CARDIOLOGY OFFICE VISIT    OFFICE NOTE:  Francisco Williamson is a delightful, 72-year-old gentleman.  He has been a patient of ours for decades.  He had an aortic valve mechanical prosthesis St. Yordan 27 mm with a composite graft and reimplantation of the coronary arteries of the ascending aorta in 1995.  At that time, he did not have any coronary artery disease.  We have been following his native aorta, which starts after the Dacron graft, and today we reviewed his CAT scan.  He is generally in the 47 mm range if you look back at the series of CAT scan imaging tests going back 5+ years. Overall, this represents stability.  His blood pressure was running a little high.  I checked it several times, and in the end, it was approximately 131/80, which is similar to what he has been getting at home.  I reviewed the stress test he had.  He did not reach target heart rate because I did the test on beta blocker because of his aorta, but no ischemia was identified, no EKG changes, and wall motion was normal.  When they did the CAT scan, they again identified significant calcific coronary artery, so I am going to try to get his LDL closer to 70.  I am going to add Zetia to his Lipitor 80, and we will have him come back in about 2 months for a recheck on that.  Otherwise, blood pressure is controlled.  No anginal symptoms.  No dizziness or syncope.  Blood pressure is controlled.  I reviewed tests from Dr. Hernandez's office, and I thank Dr. Hernandez for forwarding the note on the CAT scan. They did note a lung nodule, which is stable.  He has a large, but stable kidney cyst.  Electrolyte panel, including creatinine, was normal.  The patient asked about injectable cholesterol medicines, and I think given that he has no known significant coronary artery disease and a negative stress test, just adding the Zetia and trying to get LDL closer to 70 makes the most sense.  We reviewed his blood sugar numbers from   David's office.    Today's visit was 25 minutes, all counseling and reviewing the test.    Jeff Pedro MD    cc:  Biju Hernandez MD   [facility]  [address] [place] [address]    Jeff Pedro MD        D: 2023   T: 2023   MT: salena    Name:     DEN HARKINS  MRN:      [id number]        Account:      [id number]   :      1950           Service Date: 2023       Document: [id number]

## 2023-06-28 NOTE — LETTER
6/28/2023    Biju Hernandez MD  [address]  [facility] [address]    RE: Fracnisco Williamson       Dear Colleague,     I had the pleasure of seeing Francisco Williamson in the [facility].  HPI and Plan:       Orders Placed This Encounter   Procedures    Lipid Profile    Follow-Up with Cardiology     Orders Placed This Encounter   Medications    spironolactone (ALDACTONE) 25 MG tablet     Sig: Pt taking 1/2 tablet once a day     Dispense:  45 tablet     Refill:  3    ezetimibe (ZETIA) 10 MG tablet     Sig: Take 1 tablet (10 mg) by mouth daily     Dispense:  90 tablet     Refill:  3     Medications Discontinued During This Encounter   Medication Reason    spironolactone (ALDACTONE) 25 MG tablet Reorder (No AVS / No eCancel)         Encounter Diagnoses   Name Primary?    Benign essential hypertension     Coronary artery disease involving native coronary artery of native heart without angina pectoris Yes    Hyperlipidemia LDL goal <70        CURRENT MEDICATIONS:  Current Outpatient Medications   Medication Sig Dispense Refill    amLODIPine (NORVASC) 10 MG tablet Take 10 mg by mouth daily      atorvastatin (LIPITOR) 80 MG tablet Take 80 mg by mouth daily      benazepril (LOTENSIN) 10 MG tablet Take 1 tablet (10 mg) by mouth 2 times daily 180 tablet 3    cholecalciferol (VITAMIN D) 1000 UNIT tablet Take 2 tablets by mouth daily. 100 tablet prn    co-enzyme Q-10 100 MG CAPS Take 1 capsule by mouth daily. 90 capsule prn    ezetimibe (ZETIA) 10 MG tablet Take 1 tablet (10 mg) by mouth daily 90 tablet 3    IRON PO Take 65 mg by mouth 2 times daily      loratadine (CLARITIN) 10 MG tablet Take 10 mg by mouth daily      metoprolol succinate ER (TOPROL-XL) 100 MG 24 hr tablet Take 100 mg by mouth daily       OMEPRAZOLE PO Take 40 mg by mouth 2 times daily      spironolactone (ALDACTONE) 25 MG tablet Pt taking 1/2 tablet once a day 45 tablet 3    terazosin (HYTRIN) 5 MG capsule Take 5 mg by mouth At Bedtime       Warfarin Sodium (COUMADIN PO) Take 7 mg by mouth daily       melatonin 3 MG tablet Take 3 mg by mouth nightly as needed for sleep (Patient not taking: Reported on 4/14/2022)      senna-docusate (SENOKOT-S/PERICOLACE) 8.6-50 MG tablet Take 1 tablet by mouth 2 times daily as needed for constipation (Patient not taking: Reported on 6/28/2023)         ALLERGIES     Allergies   Allergen Reactions    Morphine Sulfate        PAST MEDICAL HISTORY:  Past Medical History:   Diagnosis Date    Aortic valve disease     ascending aortic aneurysm , AVR 1995    CAD (coronary artery disease)     minimal on CT cor angiogram    Colitis     radiation colitis, 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    Cystic medial necrosis (H)     fibrillin test negative for marfan's    Embolic stroke (H)     L eye -when INR low, another CVA when given Vit K before prostate surgery    First degree heart block     and RBBB    Gastro-oesophageal reflux disease     Gastrointestinal bleeding, upper     EGD - GASTRIC ULCER, EROSIVE ESOPHAGITIS, NON BLEEDING ESOPHAGEAL ULCERS, NON BLEEDING GASTRIC ULCER, LILLIE REYNA TEAR    Hiatal hernia     Hyperlipidemia     Hypertension     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)    Intervertebral disk disease     cervicle and lumbar    Intervertebral disk disease     L4/5 disk herniation    Laceration     L wrist with complete severing of radial artery    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 Subdural hematoma was diagnosed on  4/20/2018 after acute neurologic decompensation, and patient was managed with right sided parietal craniectomy and frontal logan hole in [place].Transferred to [facility] 4/27 after he was stabilized. in [place]       PAST SURGICAL HISTORY:  Past Surgical History:   Procedure Laterality Date    AORTIC VALVE REPLACEMENT  1995    SJM 27mm [id number] serial# [id number]: aortic valve replacement with composite graft with reimplanation of the coronary arteries into the graft    COLONOSCOPY      craineotomy  04/2018     closed head injury while vacationing in [place] 4/27/2018 and required emergent right subdural hematoma evacuation    H CATARACT SURGICAL PACKAGE Bilateral 2020    bilat    INSERT RADIATION SEEDS PROSTATE  3/15/2012    Procedure:INSERT RADIATION SEEDS PROSTATE; INSERT RADIATION SEEDS PROSTATE  - Paladium Seeds  110 seeds inserted; Surgeon:JONATHAN BAY; Location:[facility]    MOHS MICROGRAPHIC PROCEDURE      SCC face    ORTHOPEDIC SURGERY      LEFT ROTATOR CUFF REPAIR    REPAIR ESOPHAGEAL STRICTURE      ?clip in esophagus       FAMILY HISTORY:  Family History   Problem Relation Age of Onset    Heart Murmur Father 50        mitral valve disease-Rheumatic valve disease    Alzheimer Disease Mother 95    Bipolar Disorder Sister     Prostate Cancer Brother     Skin Cancer No family hx of        SOCIAL HISTORY:  Social History     Socioeconomic History    Marital status:      Spouse name: None    Number of children: None    Years of education: None    Highest education level: None   Occupational History    Occupation: retired     Comment: prior medical device exec   Tobacco Use    Smoking status: Never    Smokeless tobacco: Never   Substance and Sexual Activity    Alcohol use: Yes     Comment: 1 beer per day    Drug use: No   Other Topics Concern    Special Diet No    Exercise No       Review of Systems:  Skin:        Eyes:       ENT:       Respiratory:  Negative    Cardiovascular:  Negative for;palpitations;chest  "pain;edema;dizziness;lightheadedness;syncope or near-syncope;fatigue    Gastroenterology:      Genitourinary:       Musculoskeletal:       Neurologic:       Psychiatric:       Heme/Lymph/Imm:       Endocrine:  Negative      Physical Exam:  Vitals: /70   Pulse 60   Ht 1.816 m (5' 11.5\")   Wt 90.7 kg (200 lb)   SpO2 97%   BMI 27.51 kg/m      Constitutional:           Skin:             Head:           Eyes:           Lymph:      ENT:           Neck:           Respiratory:            Cardiac:                                                           GI:           Extremities and Muscular Skeletal:                 Neurological:           Psych:         Recent Lab Results:  LIPID RESULTS:  Lab Results   Component Value Date    CHOL 192 04/11/2022    CHOL 171 03/03/2021    HDL 67 04/11/2022    HDL 64 03/03/2021     (H) 04/11/2022    LDL 89 03/03/2021    TRIG 91 04/11/2022    TRIG 97 03/03/2021    CHOLHDLRATIO 2.6 08/09/2012       LIVER ENZYME RESULTS:  Lab Results   Component Value Date    AST 23 03/03/2021    ALT 19 03/03/2021       CBC RESULTS:  Lab Results   Component Value Date    WBC 4.8 11/16/2017    RBC 4.43 11/16/2017    HGB 13.6 11/16/2017    HCT 42.6 11/16/2017    MCV 96.3 11/16/2017    MCH 30.6 11/16/2017    MCHC 31.8 11/16/2017    RDW 15.8 11/16/2017     12/04/2021     11/16/2017       BMP RESULTS:  Lab Results   Component Value Date     06/01/2022     06/29/2021    POTASSIUM 4.5 06/01/2022    POTASSIUM 4.2 06/29/2021    CHLORIDE 101 06/01/2022    CHLORIDE 102 06/29/2021    CO2 28 06/01/2022    CO2 30 06/29/2021    ANIONGAP 6 06/01/2022    ANIONGAP 3 06/29/2021    GLC 88 06/01/2022    GLC 90 06/29/2021    BUN 17 06/01/2022    BUN 17 06/29/2021    CR 1.2 06/23/2023    CR 1.07 06/01/2022    CR 1.16 06/29/2021    GFRESTIMATED >60 06/23/2023    GFRESTIMATED 62 06/29/2021    GFRESTBLACK 72 06/29/2021    SACHA 8.3 (L) 06/01/2022    SACHA 8.7 06/29/2021        A1C RESULTS:  No " results found for: A1C    INR RESULTS:  Lab Results   Component Value Date    INR 1.49 (H) 12/04/2021    INR 2.31 (H) 08/08/2015    INR 2.59 (H) 06/23/2012           CC  No referring provider defined for this encounter.    Service Date: 06/28/2023    CARDIOLOGY OFFICE VISIT    OFFICE NOTE:  Francisco Williamson is a delightful, 72-year-old gentleman.  He has been a patient of ours for decades.  He had an aortic valve mechanical prosthesis St. Yordan 27 mm with a composite graft and reimplantation of the coronary arteries of the ascending aorta in 1995.  At that time, he did not have any coronary artery disease.  We have been following his native aorta, which starts after the Dacron graft, and today we reviewed his CAT scan.  He is generally in the 47 mm range if you look back at the series of CAT scan imaging tests going back 5+ years. Overall, this represents stability.  His blood pressure was running a little high.  I checked it several times, and in the end, it was approximately 131/80, which is similar to what he has been getting at home.  I reviewed the stress test he had.  He did not reach target heart rate because I did the test on beta blocker because of his aorta, but no ischemia was identified, no EKG changes, and wall motion was normal.  When they did the CAT scan, they again identified significant calcific coronary artery, so I am going to try to get his LDL closer to 70.  I am going to add Zetia to his Lipitor 80, and we will have him come back in about 2 months for a recheck on that.  Otherwise, blood pressure is controlled.  No anginal symptoms.  No dizziness or syncope.  Blood pressure is controlled.  I reviewed tests from Dr. Hernandez's office, and I thank Dr. Hernandez for forwarding the note on the CAT scan. They did note a lung nodule, which is stable.  He has a large, but stable kidney cyst.  Electrolyte panel, including creatinine, was normal.  The patient asked about injectable cholesterol medicines, and I  think given that he has no known significant coronary artery disease and a negative stress test, just adding the Zetia and trying to get LDL closer to 70 makes the most sense.  We reviewed his blood sugar numbers from Dr. Hernandez's office.    Today's visit was 25 minutes, all counseling and reviewing the test.    Jeff Pedro MD    cc:  [place]iju [place] [address]    Jeff Pedro MD        D: 2023   T: 2023   MT: salena    Name:     DEN HARKINS  MRN:      [id number]        Account:      [id number]   :      1950           Service Date: 2023       Document: [id number]     Thank you for allowing me to participate in the care of your patient.      Sincerely,     Jeff Pedro MD     [facility]  cc:   No referring provider defined for this encounter.

## 2023-06-28 NOTE — PROGRESS NOTES
HPI and Plan:       Orders Placed This Encounter   Procedures     Lipid Profile     Follow-Up with Cardiology     Orders Placed This Encounter   Medications     spironolactone (ALDACTONE) 25 MG tablet     Sig: Pt taking 1/2 tablet once a day     Dispense:  45 tablet     Refill:  3     ezetimibe (ZETIA) 10 MG tablet     Sig: Take 1 tablet (10 mg) by mouth daily     Dispense:  90 tablet     Refill:  3     Medications Discontinued During This Encounter   Medication Reason     spironolactone (ALDACTONE) 25 MG tablet Reorder (No AVS / No eCancel)         Encounter Diagnoses   Name Primary?     Benign essential hypertension      Coronary artery disease involving native coronary artery of native heart without angina pectoris Yes     Hyperlipidemia LDL goal <70        CURRENT MEDICATIONS:  Current Outpatient Medications   Medication Sig Dispense Refill     amLODIPine (NORVASC) 10 MG tablet Take 10 mg by mouth daily       atorvastatin (LIPITOR) 80 MG tablet Take 80 mg by mouth daily       benazepril (LOTENSIN) 10 MG tablet Take 1 tablet (10 mg) by mouth 2 times daily 180 tablet 3     cholecalciferol (VITAMIN D) 1000 UNIT tablet Take 2 tablets by mouth daily. 100 tablet prn     co-enzyme Q-10 100 MG CAPS Take 1 capsule by mouth daily. 90 capsule prn     ezetimibe (ZETIA) 10 MG tablet Take 1 tablet (10 mg) by mouth daily 90 tablet 3     IRON PO Take 65 mg by mouth 2 times daily       loratadine (CLARITIN) 10 MG tablet Take 10 mg by mouth daily       metoprolol succinate ER (TOPROL-XL) 100 MG 24 hr tablet Take 100 mg by mouth daily        OMEPRAZOLE PO Take 40 mg by mouth 2 times daily       spironolactone (ALDACTONE) 25 MG tablet Pt taking 1/2 tablet once a day 45 tablet 3     terazosin (HYTRIN) 5 MG capsule Take 5 mg by mouth At Bedtime       Warfarin Sodium (COUMADIN PO) Take 7 mg by mouth daily        melatonin 3 MG tablet Take 3 mg by mouth nightly as needed for sleep (Patient not taking: Reported on 4/14/2022)        senna-docusate (SENOKOT-S/PERICOLACE) 8.6-50 MG tablet Take 1 tablet by mouth 2 times daily as needed for constipation (Patient not taking: Reported on 6/28/2023)         ALLERGIES     Allergies   Allergen Reactions     Morphine Sulfate        PAST MEDICAL HISTORY:  Past Medical History:   Diagnosis Date     Aortic valve disease     ascending aortic aneurysm , AVR 1995     CAD (coronary artery disease)     minimal on CT cor angiogram     Colitis     radiation colitis, 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     Cystic medial necrosis (H)     fibrillin test negative for marfan's     Embolic stroke (H)     L eye -when INR low, another CVA when given Vit K before prostate surgery     First degree heart block     and RBBB     Gastro-oesophageal reflux disease      Gastrointestinal bleeding, upper     EGD - GASTRIC ULCER, EROSIVE ESOPHAGITIS, NON BLEEDING ESOPHAGEAL ULCERS, NON BLEEDING GASTRIC ULCER, LILLIE REYNA TEAR     Hiatal hernia      Hyperlipidemia      Hypertension      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)     Intervertebral disk disease     cervicle and lumbar     Intervertebral disk disease     L4/5 disk herniation     Laceration     L wrist with complete severing of radial artery     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 Subdural hematoma was diagnosed on 4/20/2018 after acute neurologic decompensation, and patient was managed with right sided parietal craniectomy and frontal logan hole in [place].Transferred to  [facility] 4/27 after he was stabilized. in [place]       PAST SURGICAL HISTORY:  Past Surgical History:   Procedure Laterality Date     AORTIC VALVE REPLACEMENT  1995    SJM 27mm 27-CAVG-[id number] serial# [id number]: aortic valve replacement with composite graft with reimplanation of the coronary arteries into the graft     COLONOSCOPY       craineotomy  04/2018     closed head injury while vacationing in [place] 4/27/2018 and required emergent right subdural hematoma evacuation     H CATARACT SURGICAL PACKAGE Bilateral 2020    bilat     INSERT RADIATION SEEDS PROSTATE  3/15/2012    Procedure:INSERT RADIATION SEEDS PROSTATE; INSERT RADIATION SEEDS PROSTATE  - Paladium Seeds  110 seeds inserted; Surgeon:JONATHAN BAY; Location:[facility]     MOHS MICROGRAPHIC PROCEDURE      SCC face     ORTHOPEDIC SURGERY      LEFT ROTATOR CUFF REPAIR     REPAIR ESOPHAGEAL STRICTURE      ?clip in esophagus       FAMILY HISTORY:  Family History   Problem Relation Age of Onset     Heart Murmur Father 50        mitral valve disease-Rheumatic valve disease     Alzheimer Disease Mother 95     Bipolar Disorder Sister      Prostate Cancer Brother      Skin Cancer No family hx of        SOCIAL HISTORY:  Social History     Socioeconomic History     Marital status:      Spouse name: None     Number of children: None     Years of education: None     Highest education level: None   Occupational History     Occupation: retired     Comment: prior medical device exec   Tobacco Use     Smoking status: Never     Smokeless tobacco: Never   Substance and Sexual Activity     Alcohol use: Yes     Comment: 1 beer per day     Drug use: No   Other Topics Concern     Special Diet No     Exercise No       Review of Systems:  Skin:        Eyes:       ENT:       Respiratory:  Negative    Cardiovascular:  Negative for;palpitations;chest pain;edema;dizziness;lightheadedness;syncope or near-syncope;fatigue    Gastroenterology:      Genitourinary:       Musculoskeletal:      "  Neurologic:       Psychiatric:       Heme/Lymph/Imm:       Endocrine:  Negative      Physical Exam:  Vitals: /70   Pulse 60   Ht 1.816 m (5' 11.5\")   Wt 90.7 kg (200 lb)   SpO2 97%   BMI 27.51 kg/m      Constitutional:           Skin:             Head:           Eyes:           Lymph:      ENT:           Neck:           Respiratory:            Cardiac:                                                           GI:           Extremities and Muscular Skeletal:                 Neurological:           Psych:         Recent Lab Results:  LIPID RESULTS:  Lab Results   Component Value Date    CHOL 192 04/11/2022    CHOL 171 03/03/2021    HDL 67 04/11/2022    HDL 64 03/03/2021     (H) 04/11/2022    LDL 89 03/03/2021    TRIG 91 04/11/2022    TRIG 97 03/03/2021    CHOLHDLRATIO 2.6 08/09/2012       LIVER ENZYME RESULTS:  Lab Results   Component Value Date    AST 23 03/03/2021    ALT 19 03/03/2021       CBC RESULTS:  Lab Results   Component Value Date    WBC 4.8 11/16/2017    RBC 4.43 11/16/2017    HGB 13.6 11/16/2017    HCT 42.6 11/16/2017    MCV 96.3 11/16/2017    MCH 30.6 11/16/2017    MCHC 31.8 11/16/2017    RDW 15.8 11/16/2017     12/04/2021     11/16/2017       BMP RESULTS:  Lab Results   Component Value Date     06/01/2022     06/29/2021    POTASSIUM 4.5 06/01/2022    POTASSIUM 4.2 06/29/2021    CHLORIDE 101 06/01/2022    CHLORIDE 102 06/29/2021    CO2 28 06/01/2022    CO2 30 06/29/2021    ANIONGAP 6 06/01/2022    ANIONGAP 3 06/29/2021    GLC 88 06/01/2022    GLC 90 06/29/2021    BUN 17 06/01/2022    BUN 17 06/29/2021    CR 1.2 06/23/2023    CR 1.07 06/01/2022    CR 1.16 06/29/2021    GFRESTIMATED >60 06/23/2023    GFRESTIMATED 62 06/29/2021    GFRESTBLACK 72 06/29/2021    SACHA 8.3 (L) 06/01/2022    SACHA 8.7 06/29/2021        A1C RESULTS:  No results found for: A1C    INR RESULTS:  Lab Results   Component Value Date    INR 1.49 (H) 12/04/2021    INR 2.31 (H) 08/08/2015    INR " 2.59 (H) 06/23/2012           CC  No referring provider defined for this encounter.

## 2023-07-11 DIAGNOSIS — I10 BENIGN ESSENTIAL HYPERTENSION: ICD-10-CM

## 2023-07-11 RX ORDER — BENAZEPRIL HYDROCHLORIDE 10 MG/1
10 TABLET ORAL 2 TIMES DAILY
Qty: 180 TABLET | Refills: 3 | Status: SHIPPED | OUTPATIENT
Start: 2023-07-11 | End: 2024-07-10

## 2023-08-22 NOTE — PROGRESS NOTES
~Cardiology Clinic Visit~    Primary Cardiologist: Dr. Pedro  Reason for visit: 2-month follow up for medication changes    History of Present Illness  Francisco Williamson is a very pleasant 73 year old male with a past medical history notable for AVR with conduit in 1995 ([employer] 27mm device with composite graft with reimplantation of coronary arteries up to - but not through - the arch), coronary calcification, mild to moderate MR    In 2017 imaging, we noted that the aortic valve gradients were at the uppler limit of normal.  Fluroscopy was done and showed that in fact the gradients were normal.  Patient also has aneurysmal dilation of the mid to distal ascending thoracic aorta.      Exercise stress echo on 5/6/2022 did not show evidence of ischemia, EKG changes or wall motion abnormalities.  However, patient did not reach target heart rate because he is on beta-blocker therapy because of his aortic history.    Serial CT scan completed on 6/33/23 showed dilation of the ascending native thoracic aorta just distal to the graft measuring 4.8 cm x4.6 cm.  This is not significantly changed compared to prior.  The graft is widely patent without thickening or fluid.  The aortic arch measures 3.4 cm x 3.6 cm, which is not significantly changed.  Again on CT scan, significant coronary artery calcification was identified.  His LDL goal should be closer to 70.  He is currently on Lipitor 80 mg daily, was just recently started on Zetia 10 mg daily.    In previous visits, he has been somewhat hypertensive, with the best BP reading approximately 131/80 at his last office visit.  This is similar to what he has been documenting at home.    At his last office visit with Dr. Smith in June 2023, he was started on Zetia 10 mg daily.  We repeated a lipid profile on 8/25/2023 that showed excellent control with , HDL 54, LDL 64 (previously 107), .    Interval 08/28/23    Patient feels well today.  We  discussed his excellent lipid numbers, and he is having no side effects on his current regimen.  BP at home averages systolic 130s.  Given the change of weather and poor air quality, he did take an OTC allergy decongestant medication this morning which raised his BP.  He is compliant with his medications.  We discussed alternative medications to try without the phenylephrine component.  __________________________________________________________________         Assessment and Impression:     HTN  -Improved control; stable BP checks on home readings.  -On Benazepril 10mg BID, Amlodipine 10 mg daily  -No snoring at night  -Prior sleep apnea testing was negative  -Rare use of PRN decongestant OTC allergy medications    S/p AVR composite valve replacment 1995, on warfarin, lifelong SBE prophylaxis   Hx Minimal CAD  Dilated ascending aorta, stable on serial imaging.  Dyslipidemia, Controlled on atorvastatin and zetia.         Recommendations and Plan:     Continue current medications without changes.  Prescriptions reviewed and refills sent to patient's preferred pharmacy.  Discussed alternate options for allergy control that are safe in patient with HTN.  Pt verbalized understanding of this.  Continue home BP monitoring.  Follow up with Dr. Pedro in June 2024 for annual visit.  __________________________________________________________________    Thank you for the opportunity to participate in this pleasant patient's care.    We would be happy to see this patient sooner for any concerns in the meantime.    SOFYA Suggs, CNP   Nurse Practitioner  [facility]    Today's clinic visit entailed:  Review of the result(s) of each unique test - echo, stress test, cta, labs  Prescription drug management    The level of medical decision making during this visit was of moderate complexity.    Orders this Visit:  Orders Placed This Encounter   Procedures    Follow-Up with Cardiology     Orders Placed  This Encounter   Medications    omeprazole (PRILOSEC) 40 MG DR capsule     Sig: Take 40 mg by mouth daily     Medications Discontinued During This Encounter   Medication Reason    OMEPRAZOLE PO Med Rec(No AVS / No eCancel)     Encounter Diagnoses   Name Primary?    Benign essential hypertension Yes    Coronary artery disease involving native coronary artery of native heart without angina pectoris     Hyperlipidemia LDL goal <70      CURRENT MEDICATIONS:  Current Outpatient Medications   Medication Sig Dispense Refill    amLODIPine (NORVASC) 10 MG tablet Take 10 mg by mouth daily      atorvastatin (LIPITOR) 80 MG tablet Take 80 mg by mouth daily      benazepril (LOTENSIN) 10 MG tablet Take 1 tablet (10 mg) by mouth 2 times daily 180 tablet 3    cholecalciferol (VITAMIN D) 1000 UNIT tablet Take 2 tablets by mouth daily. 100 tablet prn    co-enzyme Q-10 100 MG CAPS Take 1 capsule by mouth daily. 90 capsule prn    ezetimibe (ZETIA) 10 MG tablet Take 1 tablet (10 mg) by mouth daily 90 tablet 3    loratadine (CLARITIN) 10 MG tablet Take 10 mg by mouth daily      metoprolol succinate ER (TOPROL-XL) 100 MG 24 hr tablet Take 100 mg by mouth daily       omeprazole (PRILOSEC) 40 MG DR capsule Take 40 mg by mouth daily      senna-docusate (SENOKOT-S/PERICOLACE) 8.6-50 MG tablet Take 1 tablet by mouth 2 times daily as needed for constipation      spironolactone (ALDACTONE) 25 MG tablet Pt taking 1/2 tablet once a day 45 tablet 3    terazosin (HYTRIN) 5 MG capsule Take 5 mg by mouth At Bedtime      Warfarin Sodium (COUMADIN PO) Take 7 mg by mouth daily       IRON PO Take 65 mg by mouth 2 times daily      melatonin 3 MG tablet Take 3 mg by mouth nightly as needed for sleep (Patient not taking: Reported on 4/14/2022)       ALLERGIES     Allergies   Allergen Reactions    Morphine Sulfate      PAST MEDICAL, SURGICAL, FAMILY, SOCIAL HISTORY:  History was reviewed and updated as needed, see medical record.    Review of Systems:  A  "10-point Review Of Systems is otherwise normal except for that which is noted in the HPI and interval summary.    Physical Exam:    Vitals: BP (!) 140/76   Pulse 66   Ht 1.803 m (5' 11\")   Wt 88 kg (194 lb)   SpO2 96%   BMI 27.06 kg/m    Constitutional: Appears stated age, well nourished, NAD.   Neck: Supple. Carotid pulses full and equal.   Respiratory: Non-labored. Lungs CTAB.  Cardiovascular: RRR, normal S1 and S2. No M/G/R.  No edema.  Skin: Warm and dry. No rashes, cyanosis, edema.  Musculoskeletal/Extremities: Symmetrical movement to all extremities.  Neurologic: No gross focal deficits. Alert, awake, and oriented to all spheres.  Psychiatric: Affect appropriate. Mentation normal.    Recent Lab Results:  LIPID RESULTS:  Lab Results   Component Value Date    CHOL 138 08/25/2023    CHOL 171 03/03/2021    HDL 54 08/25/2023    HDL 64 03/03/2021    LDL 64 08/25/2023    LDL 89 03/03/2021    TRIG 102 08/25/2023    TRIG 97 03/03/2021    CHOLHDLRATIO 2.6 08/09/2012     LIVER ENZYME RESULTS:  Lab Results   Component Value Date    AST 23 03/03/2021    ALT 19 03/03/2021     CBC RESULTS:  Lab Results   Component Value Date    WBC 4.8 11/16/2017    RBC 4.43 11/16/2017    HGB 13.6 11/16/2017    HCT 42.6 11/16/2017    MCV 96.3 11/16/2017    MCH 30.6 11/16/2017    MCHC 31.8 11/16/2017    RDW 15.8 11/16/2017     12/04/2021     11/16/2017     BMP RESULTS:  Lab Results   Component Value Date     06/01/2022     06/29/2021    POTASSIUM 4.5 06/01/2022    POTASSIUM 4.2 06/29/2021    CHLORIDE 101 06/01/2022    CHLORIDE 102 06/29/2021    CO2 28 06/01/2022    CO2 30 06/29/2021    ANIONGAP 6 06/01/2022    ANIONGAP 3 06/29/2021    GLC 88 06/01/2022    GLC 90 06/29/2021    BUN 17 06/01/2022    BUN 17 06/29/2021    CR 1.2 06/23/2023    CR 1.07 06/01/2022    CR 1.16 06/29/2021    GFRESTIMATED >60 06/23/2023    GFRESTIMATED 62 06/29/2021    GFRESTBLACK 72 06/29/2021    SACHA 8.3 (L) 06/01/2022    SACHA 8.7 " 06/29/2021      A1C RESULTS:  No results found for: A1C  INR RESULTS:  Lab Results   Component Value Date    INR 1.49 (H) 12/04/2021    INR 2.31 (H) 08/08/2015    INR 2.59 (H) 06/23/2012

## 2023-08-25 ENCOUNTER — LAB (OUTPATIENT)
Dept: LAB | Facility: CLINIC | Age: 73
End: 2023-08-25
Payer: COMMERCIAL

## 2023-08-25 DIAGNOSIS — I10 BENIGN ESSENTIAL HYPERTENSION: ICD-10-CM

## 2023-08-25 DIAGNOSIS — E78.5 HYPERLIPIDEMIA LDL GOAL <70: ICD-10-CM

## 2023-08-25 DIAGNOSIS — I25.10 CORONARY ARTERY DISEASE INVOLVING NATIVE CORONARY ARTERY OF NATIVE HEART WITHOUT ANGINA PECTORIS: ICD-10-CM

## 2023-08-25 LAB
CHOLEST SERPL-MCNC: 138 MG/DL
HDLC SERPL-MCNC: 54 MG/DL
LDLC SERPL CALC-MCNC: 64 MG/DL
NONHDLC SERPL-MCNC: 84 MG/DL
TRIGL SERPL-MCNC: 102 MG/DL

## 2023-08-25 PROCEDURE — 80061 LIPID PANEL: CPT | Performed by: INTERNAL MEDICINE

## 2023-08-25 PROCEDURE — 36415 COLL VENOUS BLD VENIPUNCTURE: CPT | Performed by: INTERNAL MEDICINE

## 2023-08-28 ENCOUNTER — OFFICE VISIT (OUTPATIENT)
Dept: CARDIOLOGY | Facility: CLINIC | Age: 73
End: 2023-08-28
Attending: INTERNAL MEDICINE
Payer: COMMERCIAL

## 2023-08-28 VITALS
HEIGHT: 71 IN | BODY MASS INDEX: 27.16 KG/M2 | DIASTOLIC BLOOD PRESSURE: 76 MMHG | HEART RATE: 66 BPM | OXYGEN SATURATION: 96 % | SYSTOLIC BLOOD PRESSURE: 140 MMHG | WEIGHT: 194 LBS

## 2023-08-28 DIAGNOSIS — I25.10 CORONARY ARTERY DISEASE INVOLVING NATIVE CORONARY ARTERY OF NATIVE HEART WITHOUT ANGINA PECTORIS: ICD-10-CM

## 2023-08-28 DIAGNOSIS — I10 BENIGN ESSENTIAL HYPERTENSION: Primary | ICD-10-CM

## 2023-08-28 DIAGNOSIS — E78.5 HYPERLIPIDEMIA LDL GOAL <70: ICD-10-CM

## 2023-08-28 PROCEDURE — 99214 OFFICE O/P EST MOD 30 MIN: CPT | Performed by: NURSE PRACTITIONER

## 2023-08-28 RX ORDER — OMEPRAZOLE 40 MG/1
40 CAPSULE, DELAYED RELEASE ORAL DAILY
COMMUNITY

## 2023-08-28 NOTE — PATIENT INSTRUCTIONS
Thank you for your visit with the [facility] today.    Today's Summary     Follow up in June 2024 with Dr. Pedro for annual visit.    If you have questions or concerns, please do not hesitate to call my nursing support team at [phone].    Scheduling phone number: [phone]  [facility] Clinic Number: [phone]    It was a pleasure seeing you today.     SOFYA Suggs, CNP  Nurse Practitioner  [facility] [place]  August 28, 2023  ________________________________________________________

## 2023-08-28 NOTE — LETTER
8/28/2023    Biju Hernandez MD  [address]  [facility] [id number]    RE: Francisco Williamson       Dear Colleague,     I had the pleasure of seeing Francisco Williamson in the [facility].              ~Cardiology Clinic Visit~    Primary Cardiologist: Dr. Pedro  Reason for visit: 2-month follow up for medication changes    History of Present Illness  Francisco Williamson is a very pleasant 73 year old male with a past medical history notable for AVR with conduit in 1995 ([employer] 27mm device with composite graft with reimplantation of coronary arteries up to - but not through - the arch), coronary calcification, mild to moderate MR    In 2017 imaging, we noted that the aortic valve gradients were at the uppler limit of normal.  Fluroscopy was done and showed that in fact the gradients were normal.  Patient also has aneurysmal dilation of the mid to distal ascending thoracic aorta.      Exercise stress echo on 5/6/2022 did not show evidence of ischemia, EKG changes or wall motion abnormalities.  However, patient did not reach target heart rate because he is on beta-blocker therapy because of his aortic history.    Serial CT scan completed on 6/33/23 showed dilation of the ascending native thoracic aorta just distal to the graft measuring 4.8 cm x4.6 cm.  This is not significantly changed compared to prior.  The graft is widely patent without thickening or fluid.  The aortic arch measures 3.4 cm x 3.6 cm, which is not significantly changed.  Again on CT scan, significant coronary artery calcification was identified.  His LDL goal should be closer to 70.  He is currently on Lipitor 80 mg daily, was just recently started on Zetia 10 mg daily.    In previous visits, he has been somewhat hypertensive, with the best BP reading approximately 131/80 at his last office visit.  This is similar to what he has been documenting at home.    At his last office visit with Dr. Smith in June 2023, he was  started on Zetia 10 mg daily.  We repeated a lipid profile on 8/25/2023 that showed excellent control with , HDL 54, LDL 64 (previously 107), .    Interval 08/28/23    Patient feels well today.  We discussed his excellent lipid numbers, and he is having no side effects on his current regimen.  BP at home averages systolic 130s.  Given the change of weather and poor air quality, he did take an OTC allergy decongestant medication this morning which raised his BP.  He is compliant with his medications.  We discussed alternative medications to try without the phenylephrine component.  __________________________________________________________________         Assessment and Impression:     HTN  -Improved control; stable BP checks on home readings.  -On Benazepril 10mg BID, Amlodipine 10 mg daily  -No snoring at night  -Prior sleep apnea testing was negative  -Rare use of PRN decongestant OTC allergy medications    S/p AVR composite valve replacment 1995, on warfarin, lifelong SBE prophylaxis   Hx Minimal CAD  Dilated ascending aorta, stable on serial imaging.  Dyslipidemia, Controlled on atorvastatin and zetia.         Recommendations and Plan:     Continue current medications without changes.  Prescriptions reviewed and refills sent to patient's preferred pharmacy.  Discussed alternate options for allergy control that are safe in patient with HTN.  Pt verbalized understanding of this.  Continue home BP monitoring.  Follow up with Dr. Pedro in June 2024 for annual visit.  __________________________________________________________________    Thank you for the opportunity to participate in this pleasant patient's care.    We would be happy to see this patient sooner for any concerns in the meantime.    Brandi Barnes, SOFYA, CNP   Nurse Practitioner  [facility]    Today's clinic visit entailed:  Review of the result(s) of each unique test - echo, stress test, cta, labs  Prescription  drug management    The level of medical decision making during this visit was of moderate complexity.    Orders this Visit:  Orders Placed This Encounter   Procedures    Follow-Up with Cardiology     Orders Placed This Encounter   Medications    omeprazole (PRILOSEC) 40 MG DR capsule     Sig: Take 40 mg by mouth daily     Medications Discontinued During This Encounter   Medication Reason    OMEPRAZOLE PO Med Rec(No AVS / No eCancel)     Encounter Diagnoses   Name Primary?    Benign essential hypertension Yes    Coronary artery disease involving native coronary artery of native heart without angina pectoris     Hyperlipidemia LDL goal <70      CURRENT MEDICATIONS:  Current Outpatient Medications   Medication Sig Dispense Refill    amLODIPine (NORVASC) 10 MG tablet Take 10 mg by mouth daily      atorvastatin (LIPITOR) 80 MG tablet Take 80 mg by mouth daily      benazepril (LOTENSIN) 10 MG tablet Take 1 tablet (10 mg) by mouth 2 times daily 180 tablet 3    cholecalciferol (VITAMIN D) 1000 UNIT tablet Take 2 tablets by mouth daily. 100 tablet prn    co-enzyme Q-10 100 MG CAPS Take 1 capsule by mouth daily. 90 capsule prn    ezetimibe (ZETIA) 10 MG tablet Take 1 tablet (10 mg) by mouth daily 90 tablet 3    loratadine (CLARITIN) 10 MG tablet Take 10 mg by mouth daily      metoprolol succinate ER (TOPROL-XL) 100 MG 24 hr tablet Take 100 mg by mouth daily       omeprazole (PRILOSEC) 40 MG DR capsule Take 40 mg by mouth daily      senna-docusate (SENOKOT-S/PERICOLACE) 8.6-50 MG tablet Take 1 tablet by mouth 2 times daily as needed for constipation      spironolactone (ALDACTONE) 25 MG tablet Pt taking 1/2 tablet once a day 45 tablet 3    terazosin (HYTRIN) 5 MG capsule Take 5 mg by mouth At Bedtime      Warfarin Sodium (COUMADIN PO) Take 7 mg by mouth daily       IRON PO Take 65 mg by mouth 2 times daily      melatonin 3 MG tablet Take 3 mg by mouth nightly as needed for sleep (Patient not taking: Reported on 4/14/2022)    "    ALLERGIES     Allergies   Allergen Reactions    Morphine Sulfate      PAST MEDICAL, SURGICAL, FAMILY, SOCIAL HISTORY:  History was reviewed and updated as needed, see medical record.    Review of Systems:  A 10-point Review Of Systems is otherwise normal except for that which is noted in the HPI and interval summary.    Physical Exam:    Vitals: BP (!) 140/76   Pulse 66   Ht 1.803 m (5' 11\")   Wt 88 kg (194 lb)   SpO2 96%   BMI 27.06 kg/m    Constitutional: Appears stated age, well nourished, NAD.   Neck: Supple. Carotid pulses full and equal.   Respiratory: Non-labored. Lungs CTAB.  Cardiovascular: RRR, normal S1 and S2. No M/G/R.  No edema.  Skin: Warm and dry. No rashes, cyanosis, edema.  Musculoskeletal/Extremities: Symmetrical movement to all extremities.  Neurologic: No gross focal deficits. Alert, awake, and oriented to all spheres.  Psychiatric: Affect appropriate. Mentation normal.    Recent Lab Results:  LIPID RESULTS:  Lab Results   Component Value Date    CHOL 138 08/25/2023    CHOL 171 03/03/2021    HDL 54 08/25/2023    HDL 64 03/03/2021    LDL 64 08/25/2023    LDL 89 03/03/2021    TRIG 102 08/25/2023    TRIG 97 03/03/2021    CHOLHDLRATIO 2.6 08/09/2012     LIVER ENZYME RESULTS:  Lab Results   Component Value Date    AST 23 03/03/2021    ALT 19 03/03/2021     CBC RESULTS:  Lab Results   Component Value Date    WBC 4.8 11/16/2017    RBC 4.43 11/16/2017    HGB 13.6 11/16/2017    HCT 42.6 11/16/2017    MCV 96.3 11/16/2017    MCH 30.6 11/16/2017    MCHC 31.8 11/16/2017    RDW 15.8 11/16/2017     12/04/2021     11/16/2017     BMP RESULTS:  Lab Results   Component Value Date     06/01/2022     06/29/2021    POTASSIUM 4.5 06/01/2022    POTASSIUM 4.2 06/29/2021    CHLORIDE 101 06/01/2022    CHLORIDE 102 06/29/2021    CO2 28 06/01/2022    CO2 30 06/29/2021    ANIONGAP 6 06/01/2022    ANIONGAP 3 06/29/2021    GLC 88 06/01/2022    GLC 90 06/29/2021    BUN 17 06/01/2022    BUN 17 " 06/29/2021    CR 1.2 06/23/2023    CR 1.07 06/01/2022    CR 1.16 06/29/2021    GFRESTIMATED >60 06/23/2023    GFRESTIMATED 62 06/29/2021    GFRESTBLACK 72 06/29/2021    SACHA 8.3 (L) 06/01/2022    SACHA 8.7 06/29/2021      A1C RESULTS:  No results found for: A1C  INR RESULTS:  Lab Results   Component Value Date    INR 1.49 (H) 12/04/2021    INR 2.31 (H) 08/08/2015    INR 2.59 (H) 06/23/2012       Thank you for allowing me to participate in the care of your patient.      Sincerely,     SOFYA Suggs [facility]  cc:   Jeff Pedro MD  [address]  [place] [id number]

## 2023-09-12 ENCOUNTER — TRANSFERRED RECORDS (OUTPATIENT)
Dept: HEALTH INFORMATION MANAGEMENT | Facility: CLINIC | Age: 73
End: 2023-09-12

## 2023-10-12 ENCOUNTER — ANCILLARY PROCEDURE (OUTPATIENT)
Dept: CT IMAGING | Facility: CLINIC | Age: 73
End: 2023-10-12
Attending: FAMILY MEDICINE
Payer: COMMERCIAL

## 2023-10-12 DIAGNOSIS — S09.90XA HEAD TRAUMA: ICD-10-CM

## 2023-10-12 PROCEDURE — 70450 CT HEAD/BRAIN W/O DYE: CPT

## 2023-12-18 DIAGNOSIS — I10 BENIGN ESSENTIAL HYPERTENSION: ICD-10-CM

## 2023-12-18 RX ORDER — SPIRONOLACTONE 25 MG/1
TABLET ORAL
Qty: 45 TABLET | Refills: 1 | Status: SHIPPED | OUTPATIENT
Start: 2023-12-18 | End: 2024-05-22

## 2024-04-26 ENCOUNTER — TRANSFERRED RECORDS (OUTPATIENT)
Dept: HEALTH INFORMATION MANAGEMENT | Facility: CLINIC | Age: 74
End: 2024-04-26
Payer: COMMERCIAL

## 2024-05-22 ENCOUNTER — OFFICE VISIT (OUTPATIENT)
Dept: CARDIOLOGY | Facility: CLINIC | Age: 74
End: 2024-05-22
Payer: COMMERCIAL

## 2024-05-22 VITALS
SYSTOLIC BLOOD PRESSURE: 162 MMHG | HEART RATE: 62 BPM | BODY MASS INDEX: 26.88 KG/M2 | WEIGHT: 192 LBS | OXYGEN SATURATION: 96 % | DIASTOLIC BLOOD PRESSURE: 84 MMHG | HEIGHT: 71 IN

## 2024-05-22 DIAGNOSIS — I10 BENIGN ESSENTIAL HYPERTENSION: ICD-10-CM

## 2024-05-22 DIAGNOSIS — I25.10 CORONARY ARTERY DISEASE INVOLVING NATIVE CORONARY ARTERY OF NATIVE HEART WITHOUT ANGINA PECTORIS: ICD-10-CM

## 2024-05-22 PROCEDURE — 99215 OFFICE O/P EST HI 40 MIN: CPT | Performed by: INTERNAL MEDICINE

## 2024-05-22 RX ORDER — SPIRONOLACTONE 25 MG/1
TABLET ORAL
Qty: 90 TABLET | Refills: 3 | Status: SHIPPED | OUTPATIENT
Start: 2024-05-22

## 2024-05-22 NOTE — LETTER
"5/22/2024    Biju Hernandez MD  [address]  [facility] [address]    RE: Francisco Williamson       Dear Colleague,     I had the pleasure of seeing Francisco Williamson in the [facility].  HPI and Plan:   I had the pleasure of following up on Mr. Williamson today.  He is a delightful patient and noted for years he had aortic valve replacement mechanical with a conduit.  I do have some concern about the the aortic arch after the artificial dacryon aorta is now about 47 to 48 mm.  His blood pressure is high here in the office he tells me that we have checked his home blood pressure machine against ours in the past and his home numbers are always normal nevertheless I checked here in the office twice over 15 minutes and I am still getting high blood pressures given that the aorta is enlarged I think we have to be concerned that this might be more than just \"whitecoat hypertension.  I will note however that 2 years ago on his stress test his blood pressure numbers were normal at each stage of exercise.  Patient does not have any known coronary disease based on stress test and his angiogram before his heart surgery 2 decades ago at this point I am going to increase Aldactone from 12.5 up to 25 come back in 2 weeks for potassium and creatinine check.  I cannot increase the beta-blocker anymore because his resting heart rate at night is in the 40s and here in the office at 60 although we could consider switching him to nebivolol as a beta-blocker which is a little stronger and has vasodilating properties without lowering the heart rate any further.  He is also on medium dose Lotensin we have the option of increasing it but that he still has an issue about potassium and creatinine    We should probably do a follow-up CAT scan of his aorta towards the end of this year or early next year the aorta is slightly dilated but it has been stable over the last couple years.  Clinically the patient feels well with no " cardiovascular complaints  .  Today's visit was 42 minutes moderate to high complexity we reviewed blood pressure numbers his aorta his previous stress test his blood work including lipids electrolytes  Orders Placed This Encounter   Procedures    Basic metabolic panel    Follow-Up with Cardiology ALPHONSO     Orders Placed This Encounter   Medications    spironolactone (ALDACTONE) 25 MG tablet     Sig: take 1 tablet once a day     Dispense:  90 tablet     Refill:  3     Medications Discontinued During This Encounter   Medication Reason    spironolactone (ALDACTONE) 25 MG tablet Reorder (No AVS)         Encounter Diagnoses   Name Primary?    Coronary artery disease involving native coronary artery of native heart without angina pectoris     Benign essential hypertension        CURRENT MEDICATIONS:  Current Outpatient Medications   Medication Sig Dispense Refill    amLODIPine (NORVASC) 10 MG tablet Take 10 mg by mouth daily      atorvastatin (LIPITOR) 80 MG tablet Take 40 mg by mouth 2 times daily      benazepril (LOTENSIN) 10 MG tablet Take 1 tablet (10 mg) by mouth 2 times daily 180 tablet 3    cholecalciferol (VITAMIN D) 1000 UNIT tablet Take 2 tablets by mouth daily. 100 tablet prn    co-enzyme Q-10 100 MG CAPS Take 1 capsule by mouth daily. 90 capsule prn    ezetimibe (ZETIA) 10 MG tablet Take 1 tablet (10 mg) by mouth daily 90 tablet 3    loratadine (CLARITIN) 10 MG tablet Take 10 mg by mouth daily      metoprolol succinate ER (TOPROL-XL) 100 MG 24 hr tablet Take 100 mg by mouth daily       spironolactone (ALDACTONE) 25 MG tablet take 1 tablet once a day 90 tablet 3    terazosin (HYTRIN) 5 MG capsule Take 5 mg by mouth At Bedtime      Warfarin Sodium (COUMADIN PO) Take 7 mg by mouth daily       IRON PO Take 65 mg by mouth 2 times daily      melatonin 3 MG tablet Take 3 mg by mouth nightly as needed for sleep (Patient not taking: Reported on 4/14/2022)      omeprazole (PRILOSEC) 40 MG DR capsule Take 40 mg by mouth  daily      senna-docusate (SENOKOT-S/PERICOLACE) 8.6-50 MG tablet Take 1 tablet by mouth 2 times daily as needed for constipation         ALLERGIES     Allergies   Allergen Reactions    Morphine Sulfate        PAST MEDICAL HISTORY:  Past Medical History:   Diagnosis Date    Aortic valve disease     ascending aortic aneurysm , AVR 1995    CAD (coronary artery disease)     minimal on CT cor angiogram    Colitis     radiation colitis, 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    Cystic medial necrosis (H)     fibrillin test negative for marfan's    Embolic stroke (H)     L eye -when INR low, another CVA when given Vit K before prostate surgery    First degree heart block     and RBBB    Gastro-oesophageal reflux disease     Gastrointestinal bleeding, upper     EGD - GASTRIC ULCER, EROSIVE ESOPHAGITIS, NON BLEEDING ESOPHAGEAL ULCERS, NON BLEEDING GASTRIC ULCER, LILLIE REYNA TEAR    Hiatal hernia     Hyperlipidemia     Hypertension     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)    Intervertebral disk disease     cervicle and lumbar    Intervertebral disk disease     L4/5 disk herniation    Laceration     L wrist with complete severing of radial artery    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 Subdural hematoma was diagnosed on 4/20/2018 after acute neurologic decompensation, and patient was managed with right sided parietal craniectomy and frontal logan hole in [place].Transferred to [facility] 4/27 after he was stabilized. in [place]       PAST  "SURGICAL HISTORY:  Past Surgical History:   Procedure Laterality Date    AORTIC VALVE REPLACEMENT  1995    [facility] 27mm [id number]-404 serial# [id number]: aortic valve replacement with composite graft with reimplanation of the coronary arteries into the graft    COLONOSCOPY      craineotomy  04/2018     closed head injury while vacationing in [place] 4/27/2018 and required emergent right subdural hematoma evacuation    H CATARACT SURGICAL PACKAGE Bilateral 2020    bilat    INSERT RADIATION SEEDS PROSTATE  3/15/2012    Procedure:INSERT RADIATION SEEDS PROSTATE; INSERT RADIATION SEEDS PROSTATE  - Paladium Seeds  110 seeds inserted; Surgeon:JONATHAN BAY; Location:[facility]    MOHS MICROGRAPHIC PROCEDURE      SCC face    ORTHOPEDIC SURGERY      LEFT ROTATOR CUFF REPAIR    REPAIR ESOPHAGEAL STRICTURE      ?clip in esophagus       FAMILY HISTORY:  Family History   Problem Relation Age of Onset    Heart Murmur Father 50        mitral valve disease-Rheumatic valve disease    Alzheimer Disease Mother 95    Bipolar Disorder Sister     Prostate Cancer Brother     Skin Cancer No family hx of        SOCIAL HISTORY:  Social History     Socioeconomic History    Marital status:    Occupational History    Occupation: retired     Comment: prior medical device exec   Tobacco Use    Smoking status: Never    Smokeless tobacco: Never   Substance and Sexual Activity    Alcohol use: Yes     Comment: couple beers per week    Drug use: No   Other Topics Concern    Special Diet No    Exercise No       Review of Systems:  Skin:        Eyes:       ENT:       Respiratory:       Cardiovascular:       Gastroenterology:      Genitourinary:       Musculoskeletal:       Neurologic:       Psychiatric:       Heme/Lymph/Imm:       Endocrine:         Physical Exam:  Vitals: BP (!) 162/84   Pulse 62   Ht 1.803 m (5' 11\")   Wt 87.1 kg (192 lb)   SpO2 96%   BMI 26.78 kg/m        Constitutional:  cooperative, alert and oriented, well developed, well " nourished, in no acute distress        Skin:  warm and dry to the touch, no apparent skin lesions or masses noted          Head:  normocephalic, no masses or lesions        Eyes:  pupils equal and round, conjunctivae and lids unremarkable, sclera white, no xanthalasma, EOMS intact, no nystagmus        Lymph:No Cervical lymphadenopathy present;No thyromegaly     ENT:           Neck:  carotid pulses are full and equal bilaterally, JVP normal, no carotid bruit transmitted murmur      Respiratory:  normal breath sounds, clear to auscultation, normal A-P diameter, normal symmetry, normal respiratory excursion, no use of accessory muscles         Cardiac: regular rhythm     crisp prosthetic valve sounds systolic ejection murmur;grade 2        pulses full and equal, no bruits auscultated                                        GI:  abdomen soft, non-tender, BS normoactive, no mass, no HSM, no bruits        Extremities and Muscular Skeletal:  no deformities, clubbing, cyanosis, erythema observed;no edema              Neurological:  no gross motor deficits        Psych:  Alert and Oriented x 3      Recent Lab Results:  LIPID RESULTS:  Lab Results   Component Value Date    CHOL 138 08/25/2023    CHOL 171 03/03/2021    HDL 54 08/25/2023    HDL 64 03/03/2021    LDL 64 08/25/2023    LDL 89 03/03/2021    TRIG 164 (A) 04/26/2024    TRIG 97 03/03/2021    CHOLHDLRATIO 2.6 08/09/2012       LIVER ENZYME RESULTS:  Lab Results   Component Value Date    AST 23 03/03/2021    ALT 19 03/03/2021       CBC RESULTS:  Lab Results   Component Value Date    WBC 4.8 11/16/2017    RBC 4.43 11/16/2017    HGB 13.6 11/16/2017    HCT 42.6 11/16/2017    MCV 96.3 11/16/2017    MCH 30.6 11/16/2017    MCHC 31.8 11/16/2017    RDW 15.8 11/16/2017     12/04/2021     11/16/2017       BMP RESULTS:  Lab Results   Component Value Date     06/01/2022     06/29/2021    POTASSIUM 4.5 06/01/2022    POTASSIUM 4.2 06/29/2021    CHLORIDE 99  "04/26/2024    CHLORIDE 102 06/29/2021    CO2 28 06/01/2022    CO2 30 06/29/2021    ANIONGAP 6 06/01/2022    ANIONGAP 3 06/29/2021    GLC 88 06/01/2022    GLC 90 06/29/2021    BUN 17 06/01/2022    BUN 17 06/29/2021    CR 1.2 06/23/2023    CR 1.07 06/01/2022    CR 1.16 06/29/2021    GFRESTIMATED >60 06/23/2023    GFRESTIMATED 62 06/29/2021    GFRESTBLACK 72 06/29/2021    SACHA 8.3 (L) 06/01/2022    SACHA 8.7 06/29/2021        A1C RESULTS:  No results found for: \"A1C\"    INR RESULTS:  Lab Results   Component Value Date    INR 1.49 (H) 12/04/2021    INR 2.31 (H) 08/08/2015    INR 2.59 (H) 06/23/2012           CC  Brandi Barnes, APRN CNP  [address]  [place] [address]      Thank you for allowing me to participate in the care of your patient.      Sincerely,     Jeff Pedro MD     [facility]  "

## 2024-05-22 NOTE — PROGRESS NOTES
"HPI and Plan:   I had the pleasure of following up on Mr. Williamson today.  He is a delightful patient and noted for years he had aortic valve replacement mechanical with a conduit.  I do have some concern about the the aortic arch after the artificial dacryon aorta is now about 47 to 48 mm.  His blood pressure is high here in the office he tells me that we have checked his home blood pressure machine against ours in the past and his home numbers are always normal nevertheless I checked here in the office twice over 15 minutes and I am still getting high blood pressures given that the aorta is enlarged I think we have to be concerned that this might be more than just \"whitecoat hypertension.  I will note however that 2 years ago on his stress test his blood pressure numbers were normal at each stage of exercise.  Patient does not have any known coronary disease based on stress test and his angiogram before his heart surgery 2 decades ago at this point I am going to increase Aldactone from 12.5 up to 25 come back in 2 weeks for potassium and creatinine check.  I cannot increase the beta-blocker anymore because his resting heart rate at night is in the 40s and here in the office at 60 although we could consider switching him to nebivolol as a beta-blocker which is a little stronger and has vasodilating properties without lowering the heart rate any further.  He is also on medium dose Lotensin we have the option of increasing it but that he still has an issue about potassium and creatinine    We should probably do a follow-up CAT scan of his aorta towards the end of this year or early next year the aorta is slightly dilated but it has been stable over the last couple years.  Clinically the patient feels well with no cardiovascular complaints  .  Today's visit was 42 minutes moderate to high complexity we reviewed blood pressure numbers his aorta his previous stress test his blood work including lipids electrolytes  Orders " Placed This Encounter   Procedures    Basic metabolic panel    Follow-Up with Cardiology ALPHONSO     Orders Placed This Encounter   Medications    spironolactone (ALDACTONE) 25 MG tablet     Sig: take 1 tablet once a day     Dispense:  90 tablet     Refill:  3     Medications Discontinued During This Encounter   Medication Reason    spironolactone (ALDACTONE) 25 MG tablet Reorder (No AVS)         Encounter Diagnoses   Name Primary?    Coronary artery disease involving native coronary artery of native heart without angina pectoris     Benign essential hypertension        CURRENT MEDICATIONS:  Current Outpatient Medications   Medication Sig Dispense Refill    amLODIPine (NORVASC) 10 MG tablet Take 10 mg by mouth daily      atorvastatin (LIPITOR) 80 MG tablet Take 40 mg by mouth 2 times daily      benazepril (LOTENSIN) 10 MG tablet Take 1 tablet (10 mg) by mouth 2 times daily 180 tablet 3    cholecalciferol (VITAMIN D) 1000 UNIT tablet Take 2 tablets by mouth daily. 100 tablet prn    co-enzyme Q-10 100 MG CAPS Take 1 capsule by mouth daily. 90 capsule prn    ezetimibe (ZETIA) 10 MG tablet Take 1 tablet (10 mg) by mouth daily 90 tablet 3    loratadine (CLARITIN) 10 MG tablet Take 10 mg by mouth daily      metoprolol succinate ER (TOPROL-XL) 100 MG 24 hr tablet Take 100 mg by mouth daily       spironolactone (ALDACTONE) 25 MG tablet take 1 tablet once a day 90 tablet 3    terazosin (HYTRIN) 5 MG capsule Take 5 mg by mouth At Bedtime      Warfarin Sodium (COUMADIN PO) Take 7 mg by mouth daily       IRON PO Take 65 mg by mouth 2 times daily      melatonin 3 MG tablet Take 3 mg by mouth nightly as needed for sleep (Patient not taking: Reported on 4/14/2022)      omeprazole (PRILOSEC) 40 MG DR capsule Take 40 mg by mouth daily      senna-docusate (SENOKOT-S/PERICOLACE) 8.6-50 MG tablet Take 1 tablet by mouth 2 times daily as needed for constipation         ALLERGIES     Allergies   Allergen Reactions    Morphine Sulfate         PAST MEDICAL HISTORY:  Past Medical History:   Diagnosis Date    Aortic valve disease     ascending aortic aneurysm , AVR 1995    CAD (coronary artery disease)     minimal on CT cor angiogram    Colitis     radiation colitis, 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    Cystic medial necrosis (H)     fibrillin test negative for marfan's    Embolic stroke (H)     L eye -when INR low, another CVA when given Vit K before prostate surgery    First degree heart block     and RBBB    Gastro-oesophageal reflux disease     Gastrointestinal bleeding, upper     EGD - GASTRIC ULCER, EROSIVE ESOPHAGITIS, NON BLEEDING ESOPHAGEAL ULCERS, NON BLEEDING GASTRIC ULCER, LILLIE REYNA TEAR    Hiatal hernia     Hyperlipidemia     Hypertension     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)    Intervertebral disk disease     cervicle and lumbar    Intervertebral disk disease     L4/5 disk herniation    Laceration     L wrist with complete severing of radial artery    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 Subdural hematoma was diagnosed on 4/20/2018 after acute neurologic decompensation, and patient was managed with right sided parietal craniectomy and frontal logan hole in [place].Transferred to [facility] 4/27 after he was stabilized. in [place]       PAST SURGICAL HISTORY:  Past Surgical History:   Procedure Laterality Date    AORTIC VALVE REPLACEMENT  1995    [facility] 27mm 27-CAVG-404 serial# [id number]: aortic valve replacement with composite graft with  "reimplanation of the coronary arteries into the graft    COLONOSCOPY      craineotomy  04/2018     closed head injury while vacationing in [place] 4/27/2018 and required emergent right subdural hematoma evacuation    H CATARACT SURGICAL PACKAGE Bilateral 2020    bilat    INSERT RADIATION SEEDS PROSTATE  3/15/2012    Procedure:INSERT RADIATION SEEDS PROSTATE; INSERT RADIATION SEEDS PROSTATE  - Paladium Seeds  110 seeds inserted; Surgeon:JONATHAN BAY; Location:[facility]    MOHS MICROGRAPHIC PROCEDURE      SCC face    ORTHOPEDIC SURGERY      LEFT ROTATOR CUFF REPAIR    REPAIR ESOPHAGEAL STRICTURE      ?clip in esophagus       FAMILY HISTORY:  Family History   Problem Relation Age of Onset    Heart Murmur Father 50        mitral valve disease-Rheumatic valve disease    Alzheimer Disease Mother 95    Bipolar Disorder Sister     Prostate Cancer Brother     Skin Cancer No family hx of        SOCIAL HISTORY:  Social History     Socioeconomic History    Marital status:    Occupational History    Occupation: retired     Comment: prior medical device exec   Tobacco Use    Smoking status: Never    Smokeless tobacco: Never   Substance and Sexual Activity    Alcohol use: Yes     Comment: couple beers per week    Drug use: No   Other Topics Concern    Special Diet No    Exercise No       Review of Systems:  Skin:        Eyes:       ENT:       Respiratory:       Cardiovascular:       Gastroenterology:      Genitourinary:       Musculoskeletal:       Neurologic:       Psychiatric:       Heme/Lymph/Imm:       Endocrine:         Physical Exam:  Vitals: BP (!) 162/84   Pulse 62   Ht 1.803 m (5' 11\")   Wt 87.1 kg (192 lb)   SpO2 96%   BMI 26.78 kg/m        Constitutional:  cooperative, alert and oriented, well developed, well nourished, in no acute distress        Skin:  warm and dry to the touch, no apparent skin lesions or masses noted          Head:  normocephalic, no masses or lesions        Eyes:  pupils equal and round, " conjunctivae and lids unremarkable, sclera white, no xanthalasma, EOMS intact, no nystagmus        Lymph:No Cervical lymphadenopathy present;No thyromegaly     ENT:           Neck:  carotid pulses are full and equal bilaterally, JVP normal, no carotid bruit transmitted murmur      Respiratory:  normal breath sounds, clear to auscultation, normal A-P diameter, normal symmetry, normal respiratory excursion, no use of accessory muscles         Cardiac: regular rhythm     crisp prosthetic valve sounds systolic ejection murmur;grade 2        pulses full and equal, no bruits auscultated                                        GI:  abdomen soft, non-tender, BS normoactive, no mass, no HSM, no bruits        Extremities and Muscular Skeletal:  no deformities, clubbing, cyanosis, erythema observed;no edema              Neurological:  no gross motor deficits        Psych:  Alert and Oriented x 3      Recent Lab Results:  LIPID RESULTS:  Lab Results   Component Value Date    CHOL 138 08/25/2023    CHOL 171 03/03/2021    HDL 54 08/25/2023    HDL 64 03/03/2021    LDL 64 08/25/2023    LDL 89 03/03/2021    TRIG 164 (A) 04/26/2024    TRIG 97 03/03/2021    CHOLHDLRATIO 2.6 08/09/2012       LIVER ENZYME RESULTS:  Lab Results   Component Value Date    AST 23 03/03/2021    ALT 19 03/03/2021       CBC RESULTS:  Lab Results   Component Value Date    WBC 4.8 11/16/2017    RBC 4.43 11/16/2017    HGB 13.6 11/16/2017    HCT 42.6 11/16/2017    MCV 96.3 11/16/2017    MCH 30.6 11/16/2017    MCHC 31.8 11/16/2017    RDW 15.8 11/16/2017     12/04/2021     11/16/2017       BMP RESULTS:  Lab Results   Component Value Date     06/01/2022     06/29/2021    POTASSIUM 4.5 06/01/2022    POTASSIUM 4.2 06/29/2021    CHLORIDE 99 04/26/2024    CHLORIDE 102 06/29/2021    CO2 28 06/01/2022    CO2 30 06/29/2021    ANIONGAP 6 06/01/2022    ANIONGAP 3 06/29/2021    GLC 88 06/01/2022    GLC 90 06/29/2021    BUN 17 06/01/2022    BUN 17  "06/29/2021    CR 1.2 06/23/2023    CR 1.07 06/01/2022    CR 1.16 06/29/2021    GFRESTIMATED >60 06/23/2023    GFRESTIMATED 62 06/29/2021    GFRESTBLACK 72 06/29/2021    SACHA 8.3 (L) 06/01/2022    SACHA 8.7 06/29/2021        A1C RESULTS:  No results found for: \"A1C\"    INR RESULTS:  Lab Results   Component Value Date    INR 1.49 (H) 12/04/2021    INR 2.31 (H) 08/08/2015    INR 2.59 (H) 06/23/2012           CC  Brandi Barnes, SOFYA CNP  [address]  [facility]  [place] [address]  "

## 2024-06-05 ENCOUNTER — LAB (OUTPATIENT)
Dept: LAB | Facility: CLINIC | Age: 74
End: 2024-06-05
Payer: COMMERCIAL

## 2024-06-05 DIAGNOSIS — I25.10 CORONARY ARTERY DISEASE INVOLVING NATIVE CORONARY ARTERY OF NATIVE HEART WITHOUT ANGINA PECTORIS: ICD-10-CM

## 2024-06-05 DIAGNOSIS — I10 BENIGN ESSENTIAL HYPERTENSION: ICD-10-CM

## 2024-06-05 PROCEDURE — 80048 BASIC METABOLIC PNL TOTAL CA: CPT

## 2024-06-05 PROCEDURE — 36415 COLL VENOUS BLD VENIPUNCTURE: CPT

## 2024-06-06 LAB
ANION GAP SERPL CALCULATED.3IONS-SCNC: 13 MMOL/L (ref 7–15)
BUN SERPL-MCNC: 15.4 MG/DL (ref 8–23)
CALCIUM SERPL-MCNC: 8.8 MG/DL (ref 8.8–10.2)
CHLORIDE SERPL-SCNC: 98 MMOL/L (ref 98–107)
CREAT SERPL-MCNC: 1.08 MG/DL (ref 0.67–1.17)
DEPRECATED HCO3 PLAS-SCNC: 24 MMOL/L (ref 22–29)
EGFRCR SERPLBLD CKD-EPI 2021: 72 ML/MIN/1.73M2
GLUCOSE SERPL-MCNC: 115 MG/DL (ref 70–99)
POTASSIUM SERPL-SCNC: 4.2 MMOL/L (ref 3.4–5.3)
SODIUM SERPL-SCNC: 135 MMOL/L (ref 135–145)

## 2024-06-25 DIAGNOSIS — I25.10 CORONARY ARTERY DISEASE INVOLVING NATIVE CORONARY ARTERY OF NATIVE HEART WITHOUT ANGINA PECTORIS: ICD-10-CM

## 2024-06-25 DIAGNOSIS — E78.5 HYPERLIPIDEMIA LDL GOAL <70: ICD-10-CM

## 2024-06-25 DIAGNOSIS — I10 BENIGN ESSENTIAL HYPERTENSION: ICD-10-CM

## 2024-06-25 RX ORDER — EZETIMIBE 10 MG/1
10 TABLET ORAL DAILY
Qty: 90 TABLET | Refills: 3 | Status: SHIPPED | OUTPATIENT
Start: 2024-06-25

## 2024-07-10 DIAGNOSIS — I10 BENIGN ESSENTIAL HYPERTENSION: ICD-10-CM

## 2024-07-10 RX ORDER — BENAZEPRIL HYDROCHLORIDE 10 MG/1
10 TABLET ORAL 2 TIMES DAILY
Qty: 180 TABLET | Refills: 4 | Status: SHIPPED | OUTPATIENT
Start: 2024-07-10

## 2024-08-11 ENCOUNTER — HEALTH MAINTENANCE LETTER (OUTPATIENT)
Age: 74
End: 2024-08-11

## 2025-03-12 ENCOUNTER — ANCILLARY PROCEDURE (OUTPATIENT)
Dept: ULTRASOUND IMAGING | Facility: CLINIC | Age: 75
End: 2025-03-12
Attending: UROLOGY
Payer: COMMERCIAL

## 2025-03-12 DIAGNOSIS — N28.1 CYST OF KIDNEY, ACQUIRED: ICD-10-CM

## 2025-03-12 PROCEDURE — 76770 US EXAM ABDO BACK WALL COMP: CPT

## 2025-03-25 ENCOUNTER — OFFICE VISIT (OUTPATIENT)
Dept: CARDIOLOGY | Facility: CLINIC | Age: 75
End: 2025-03-25
Payer: COMMERCIAL

## 2025-03-25 VITALS
HEART RATE: 69 BPM | BODY MASS INDEX: 25.6 KG/M2 | WEIGHT: 182.9 LBS | HEIGHT: 71 IN | SYSTOLIC BLOOD PRESSURE: 126 MMHG | DIASTOLIC BLOOD PRESSURE: 86 MMHG | OXYGEN SATURATION: 100 %

## 2025-03-25 DIAGNOSIS — E78.5 HYPERLIPIDEMIA, UNSPECIFIED HYPERLIPIDEMIA TYPE: ICD-10-CM

## 2025-03-25 DIAGNOSIS — I10 PRIMARY HYPERTENSION: ICD-10-CM

## 2025-03-25 DIAGNOSIS — Z95.2 S/P AVR: Primary | ICD-10-CM

## 2025-03-25 DIAGNOSIS — I71.21 ANEURYSM OF ASCENDING AORTA WITHOUT RUPTURE: ICD-10-CM

## 2025-03-25 DIAGNOSIS — I49.3 PVC'S (PREMATURE VENTRICULAR CONTRACTIONS): ICD-10-CM

## 2025-03-25 PROCEDURE — 3079F DIAST BP 80-89 MM HG: CPT | Performed by: INTERNAL MEDICINE

## 2025-03-25 PROCEDURE — 99215 OFFICE O/P EST HI 40 MIN: CPT | Performed by: INTERNAL MEDICINE

## 2025-03-25 PROCEDURE — 93000 ELECTROCARDIOGRAM COMPLETE: CPT | Performed by: INTERNAL MEDICINE

## 2025-03-25 PROCEDURE — G2211 COMPLEX E/M VISIT ADD ON: HCPCS | Performed by: INTERNAL MEDICINE

## 2025-03-25 PROCEDURE — 3074F SYST BP LT 130 MM HG: CPT | Performed by: INTERNAL MEDICINE

## 2025-03-25 RX ORDER — OMEPRAZOLE 40 MG/1
80 CAPSULE, DELAYED RELEASE ORAL 2 TIMES DAILY
COMMUNITY

## 2025-03-25 NOTE — LETTER
3/25/2025    Biju Hernandez MD  [address]  [facility] [address]    RE: Francisco Williamson       Dear Colleague,     I had the pleasure of seeing Fracnisco Williamson in the [facility].  CARDIOLOGY CLINIC ESTABLISHED PATIENT NOTE    Patient name: Francisco Williamson  Age: 74 year old  Sex: male  YOB: 1950  Primary Care Physician: Biju Hernandez      CHIEF COMPLAINT: History of mechanical AVR/valve conduit and ascending aorta repair (1995).  Patient is new to me; previously followed with Dr. Smith.    HPI: Francisco Williamson is a 74 year old male with past medical history pertinent for cystic medial necrosis of the aorta, severe aortic regurgitation status post Bentall with 27 mm Saint Yordan mechanical valve conduit (1995), moderate distal ascending aorta dilatation (4.8 cm; last assessed with CTA in June 2023), chronic moderately elevated aortic valve mean gradient (typically in the high 20s although on occasion he is at higher gradients measured; most recently 29 mmHg in 2023) felt related to possible patient prosthesis mismatch (BANG demonstrating normal aortic prosthetic leaflets and 2012; normal heart valve fluoroscopy in 2017), hypertension, dyslipidemia, occasional PVCs, large hiatal hernia, prostate cancer status post radiation seeds and XRT, traumatic subdural hematoma in 2018 treated with partial right craniectomy/logan holes, peptic ulcer disease, and multiple other noncardiac comorbidities who presents for a cardiology follow-up visit.  The patient is new to me; previously followed with Dr. Smith.  Approximately 100 pages of previous cardiology records reviewed in the context of today's visit.    He carries a diagnosis of cystic medial necrosis of the aorta; I do not have access to any pathology specimen to confirm this.  In 1995 he was noted to have severe aortic regurgitation and aneurysmal dilatation of the proximal aorta; underwent Bentall with 27 mm Saint Yordan  mechanical valve conduit.  Over the years he has been noted to have moderately elevated aortic prosthetic mean gradient; stable in the mid to high 20s range (there was one isolated elevated mean gradient of 47 mmHg in 2018); this been attributed to patient prosthesis mismatch although I would note that he has a fairly large sized aortic mechanical prosthetic valve (27 mm in size) which would make PPM unlikely.  Nonetheless BANG in 2012 demonstrated normal aortic prosthetic leaflet motion; heart valve fluoroscopy in 2013 and then again in 2017 also demonstrated normal prosthetic leaflet motion.  He has moderate dilatation of the distal ascending aorta (measures 4.8 cm distal to the proximal aortic graft); most recently assessed with CT in June 2023.  He also has a history of hypertension dyslipidemia.  His current medications include Coumadin (target INR between 2 and 3), metoprolol succinate 100 mg daily, amlodipine 10 mg daily, benazepril 10 mg daily, spironolactone 25 mg daily, terazosin 5 mg daily, atorvastatin 80 mg daily, and ezetimibe 10 mg daily.  Blood work from June 2024 reviewed; normal potassium 4.2 and creatinine of 1.08.  LDL was 93 in April 2024.    In terms of previous cardiac testing, in addition to the testing related to above, I would note that coronary angiography 9095 demonstrate normal coronary arteries.  Stress echocardiogram in 2022 was negative for myocardial ischemia.  Echocardiogram in 2023 demonstrated aortic prosthetic mean gradient of 29 mmHg and normal LVEF, and CT in June 2023 demonstrated the ascending aorta distal to his graft to measure 4.8 cm (CT in 2018 it demonstrated the distal ascending aorta to measure 4.5 cm).  When he last saw Dr. Smith in April 2024 there were working on improving blood pressure control and spironolactone was initiated.    He is doing well at this time and is asymptomatic from a cardiovascular perspective.  Blood pressure is well-controlled.  He denies  chest pain, dyspnea, palpitation, presyncope and syncope.  He has unilateral vestibular syndrome with associated vertigo; used to play golf but no longer does so.  He does walk regularly; twice a week on a treadmill and outside on other days.  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     Cystic medial necrosis (H)     fibrillin test negative for marfan's     Embolic stroke (H)     L eye -when INR low, another CVA when given Vit K before prostate surgery     First degree heart block     and RBBB     Gastro-oesophageal reflux disease      Gastrointestinal bleeding, upper     EGD - GASTRIC ULCER, EROSIVE ESOPHAGITIS, NON BLEEDING ESOPHAGEAL ULCERS, NON BLEEDING GASTRIC ULCER, LILLIE REYNA TEAR     Hiatal hernia      Hyperlipidemia      Hypertension      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)     Intervertebral disk disease     cervicle and lumbar     Intervertebral disk disease     L4/5 disk herniation     Laceration     L wrist with complete severing of radial artery     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 Subdural hematoma was diagnosed on  4/20/2018 after acute neurologic decompensation, and patient was managed with right sided parietal craniectomy and frontal logan hole in [place].Transferred to [facility] 4/27 after he was stabilized. in [place]       Past Surgical History:   Procedure Laterality Date     AORTIC VALVE REPLACEMENT  1995    SJM 27mm [id number] serial# [id number]: aortic valve replacement with composite graft with reimplanation of the coronary arteries into the graft     COLONOSCOPY       craineotomy  04/2018     closed head injury while vacationing in [place] 4/27/2018 and required emergent right subdural hematoma evacuation     H CATARACT SURGICAL PACKAGE Bilateral 2020    bilat     INSERT RADIATION SEEDS PROSTATE  3/15/2012    Procedure:INSERT RADIATION SEEDS PROSTATE; INSERT RADIATION SEEDS PROSTATE  - Paladium Seeds  110 seeds inserted; Surgeon:JONATHAN BAY; Location: [facility]     MOHS MICROGRAPHIC PROCEDURE      SCC face     ORTHOPEDIC SURGERY      LEFT ROTATOR CUFF REPAIR     REPAIR ESOPHAGEAL STRICTURE      ?clip in esophagus         FAMILY HISTORY:  I have reviewed this patient's family history.    Family History   Problem Relation Age of Onset     Heart Murmur Father 50        mitral valve disease-Rheumatic valve disease     Alzheimer Disease Mother 95     Bipolar Disorder Sister      Prostate Cancer Brother      Skin Cancer No family hx of          SOCIAL HISTORY:  I have reviewed this patient's social history.    Social History     Socioeconomic History     Marital status:    Occupational History     Occupation: retired     Comment: prior medical device exec   Tobacco Use     Smoking status: Never     Smokeless tobacco: Never   Substance and Sexual Activity     Alcohol use: Yes     Comment: couple beers per week     Drug use: No   Other Topics Concern     Special Diet No     Exercise No         CURRENT MEDICATIONS:  I have reviewed this patient's current medications.    Current Outpatient Medications   Medication Sig Dispense  "Refill     amLODIPine (NORVASC) 10 MG tablet Take 10 mg by mouth daily       atorvastatin (LIPITOR) 80 MG tablet Take 40 mg by mouth 2 times daily       benazepril (LOTENSIN) 10 MG tablet Take 1 tablet (10 mg) by mouth 2 times daily 180 tablet 4     cholecalciferol (VITAMIN D) 1000 UNIT tablet Take 2 tablets by mouth daily. 100 tablet prn     co-enzyme Q-10 100 MG CAPS Take 1 capsule by mouth daily. 90 capsule prn     ezetimibe (ZETIA) 10 MG tablet Take 1 tablet (10 mg) by mouth daily 90 tablet 3     IRON PO Take 65 mg by mouth 2 times daily       loratadine (CLARITIN) 10 MG tablet Take 10 mg by mouth daily       melatonin 3 MG tablet Take 3 mg by mouth nightly as needed for sleep (Patient not taking: Reported on 4/14/2022)       metoprolol succinate ER (TOPROL-XL) 100 MG 24 hr tablet Take 100 mg by mouth daily        omeprazole (PRILOSEC) 40 MG DR capsule Take 40 mg by mouth daily       senna-docusate (SENOKOT-S/PERICOLACE) 8.6-50 MG tablet Take 1 tablet by mouth 2 times daily as needed for constipation       spironolactone (ALDACTONE) 25 MG tablet take 1 tablet once a day 90 tablet 3     terazosin (HYTRIN) 5 MG capsule Take 5 mg by mouth At Bedtime       Warfarin Sodium (COUMADIN PO) Take 7 mg by mouth daily            ALLERGIES/SENSITIVITIES:  I have reviewed this patient's allergies.     Allergies   Allergen Reactions     Morphine Sulfate          PHYSICAL EXAM: /86   Pulse 69   Ht 1.803 m (5' 11\")   Wt 83 kg (182 lb 14.4 oz)   SpO2 100%   BMI 25.51 kg/m        GENERAL APPEARANCE: No acute distress, awake, alert.    HEENT: No scleral icterus or conjuctival hemorrhage, mucous membranes are moist.    NECK: No carotid bruits are present. Normal jugular venous pressure.    CVS: Regular rate and rhythm with normal S1 and normal mechanical S2, no S3 or S4 gallop is present.  Ectopic heartbeats noted.    LUNG: Clear to auscultation without crackles or wheezes. Respiratory effort is " "normal.    GASTROINTESTINAL: Abdomen is soft and non-tender with normal bowel sounds.    EXTREMITIES: No clubbing or cyanosis. No edema.    PSYCHIATRIC: Normal affect.    NEUROLOGIC: Alert and appropriate. No obvious focal deficits.      LABS AND DIAGNOSTICS:    CBC:  Lab Results   Component Value Date    WBC 4.8 11/16/2017    RBC 4.43 11/16/2017    HGB 13.6 11/16/2017    HCT 42.6 11/16/2017    MCV 96.3 11/16/2017    MCH 30.6 11/16/2017    MCHC 31.8 11/16/2017    RDW 15.8 11/16/2017     12/04/2021     11/16/2017       BMP:  Lab Results   Component Value Date     06/05/2024     06/29/2021    POTASSIUM 4.2 06/05/2024    POTASSIUM 4.5 06/01/2022    POTASSIUM 4.2 06/29/2021    CHLORIDE 98 06/05/2024    CHLORIDE 99 04/26/2024    CHLORIDE 102 06/29/2021    CO2 24 06/05/2024    CO2 28 06/01/2022    CO2 30 06/29/2021    ANIONGAP 13 06/05/2024    ANIONGAP 6 06/01/2022    ANIONGAP 3 06/29/2021     (H) 06/05/2024    GLC 88 06/01/2022    GLC 90 06/29/2021    BUN 15.4 06/05/2024    BUN 17 06/01/2022    BUN 17 06/29/2021    CR 1.08 06/05/2024    CR 1.16 06/29/2021    GFRESTIMATED 72 06/05/2024    GFRESTIMATED >60 06/23/2023    GFRESTIMATED 62 06/29/2021    GFRESTBLACK 72 06/29/2021    SACHA 8.8 06/05/2024    SACHA 8.7 06/29/2021       LIPIDS:  Lab Results   Component Value Date    CHOL 138 08/25/2023    CHOL 171 03/03/2021    HDL 54 08/25/2023    HDL 64 03/03/2021    LDL 64 08/25/2023    LDL 89 03/03/2021    TRIG 164 (A) 04/26/2024    TRIG 97 03/03/2021    CHOLHDLRATIO 2.6 08/09/2012       LFT:  Lab Results   Component Value Date    PROTTOTAL 6.9 03/03/2021    ALBUMIN 4.2 03/03/2021    BILITOTAL 0.5 03/03/2021    ALKPHOS 66 03/03/2021    AST 23 03/03/2021    ALT 19 03/03/2021       TFT:  Lab Results   Component Value Date    TSH 2.240 01/13/2020       HgbA1c:   No results found for: \"A1C\"    INR RESULTS:  Lab Results   Component Value Date    INR 1.49 (H) 12/04/2021    INR 2.31 (H) 08/08/2015    INR " 2.59 (H) 06/23/2012       ECG (personally reviewed):  3/25/2025: Normal sinus rhythm, PACs, left anterior fascicular block, right bundle branch block.  First-degree AV block.  10/17/2016: Normal sinus rhythm, left anterior fascicular block, right bundle branch block.    CT aorta 6/23/2023:  1. Changes of aortic valve replacement and ascending aortic graft  repair. The graft is patent.  2. Dilatation of the native ascending thoracic aorta distal to the  aortic graft measuring up to 4.8 cm, unchanged.  3. Resolution of previously seen groundglass opacities in the left  upper lobe.  4. Stable 1.0 cm nodule in the left lower lobe, likely representing  scarring.  5. Large hiatal hernia.    CT aorta 5/9/2018:  Fusiform aneurysmal dilatation of the ascending thoracic aorta distally to a maximum diameter of 4.5 cm.     Echocardiogram 4/6/2022:  The visual ejection fraction is 60-65%.  Diastolic Doppler findings (E/E' ratio and/or other parameters) suggest left  ventricular filling pressures are increased. Patient is hypertensive.  The right ventricle is normal in size and function.  The left atrium is severely dilated.  There is mild to moderate (1-2+) mitral regurgitation.  There is a 27 mm St Yordan aortic valve mechnical and a root composite graft by  history. The mean AoV pressure gradient is 29 mmHg and mild AI.  Valve acceleration times are borderline at 110 msec, but the MG seems to be  high even in echoes dating back to 2008. Findings likely suggestive of  moderate patient prosthesis mismatch.    Heart valve fluoroscopy 1/20/2017:  CONCLUSION: This bileaflet valve appears to be functioning normally  with full opening and likely full closing angles. Note that there is  still a question of tissue ingrowth. Repeat imaging of the closing  angle is important. The measurement above I used as 85 degrees is from  the horizontal, traditionally the reports are used from the complete  perpendiculars, so normal for this valve  should be 15 degrees or less.  I have simply reported it as more than 85. Again the closing angle, I  cannot be completely sure of but the two leaflets are symmetric as  they close.  What is traditionally used for the closing is 120 degrees  from one leaflet across the other leaflet with the central line being  the 0 point.    BANG 6/23/2012:  Interpretation Summary   The left ventricle is normal in size. Left ventricular systolic function is   normal. The visual ejection fraction is estimated at 55%. There is mild   biatrial enlargement. Intact atrial septum There is mild (1+) mitral   regurgitation. There is a bi-leaflet (St. Yordan) aortic mechanical prosthesis.   Valvular Strands attached to the ventricular aspect of the prosthetic aortic   valve No valvular vegetations or thrombus Graft visualized in the ascending   aorta There is no pericardial effusion. The rhythm was normal sinus. No   valvular vegetations or thrombi identified     Echocardiogram 3/18/2012:  Interpretation Summary   Normal left ventricular size and systolic function (LVEF 55-60%). There are   no regional wall motion abnormalities. Normal right ventricular size and   systolic function. Negative bubble study.  There is a mechanical aortic   valve. The valve appears well seated, but the leaflets are not well   visualized. The transaortic gradients (peak gradient of 43 mm Hg with mean   gradient of 23 mm Hg), are essentially unchanged compared to the prior study   on 08/25/2008. There is trace aortic regurgitation.  Trace mitral and   tricuspid regurgitation are noted. In comparison to the previous study   obtained on 08/25/2008, the findings appear similar.  No intracardiac   thrombus is identifed on this echocardiogram- however, a BANG should be   performed for definitive evaluation if clinically indicated.     Stress Test 5/6/2022:  1. Nondiagnostic stress exercise echocardiogram, due to inability to achieve  target heart rate (patient on  beta-blocker).  2. Patient exercised for 5:30 minutes on the Jorge protocol achieving a  maximal heart rate of 104 bpm (71% of maximal predicted).  3. Normal blood pressure response to exercise.  4. Below average exercise capacity (7.0 METS).  5. Frequent premature ventricular complexes at rest and recovery, improved  with exercise. No sustained arrhythmias.  Rest of findings per recent complete transthoracic echocardiogram dated  4/6/2022.      Cardiac catheterization 10/4/1995:  Normal coronary arteries.  Severe aortic regurgitation.      IMPRESSION AND PLAN: Francisco Williamson is a 74 year old male with past medical history pertinent for cystic medial necrosis of the aorta, severe aortic regurgitation status post Bentall with 27 mm Saint Yordan mechanical valve conduit (1995), moderate distal ascending aorta dilatation (4.8 cm; last assessed with CTA in June 2023), chronic moderately elevated aortic valve mean gradient (typically in the high 20s although on occasion he is at higher gradients measured; most recently 29 mmHg in 2023) felt related to possible patient prosthesis mismatch (BANG demonstrating normal aortic prosthetic leaflets and 2012; normal heart valve fluoroscopy in 2017), hypertension, dyslipidemia, occasional PVCs, large hiatal hernia, prostate cancer status post radiation seeds and XRT, traumatic subdural hematoma in 2018 treated with partial right craniectomy/logan holes, peptic ulcer disease, and multiple other noncardiac comorbidities who presents for a cardiology follow-up visit.  The patient is new to me; previously followed with Dr. Smith.  Approximately 100 pages of previous cardiology records reviewed in the context of today's visit.    Status post AVR (27 mm Saint Yordan mechanical valve conduit 1995): He has been noted to have moderately elevated aortic prosthetic mean gradients over the years; stable in the high 20s range; normal valve fluoroscopy in 2017 and normal BANG in 2012.  The  elevated gradients have been attributed to patient prosthesis mismatch; this would be somewhat unusual given the large valve size.  Nonetheless, it is reassuring that prior evaluation for moderately elevated gradients including BANG and heart valve fluoroscopy has been unremarkable.  I would recommend repeat echocardiogram at this time.  I will relay results to them.  Continue Coumadin with target INR between 2 and 3.    Ascending aorta aneurysm, cystic medial necrosis of the aorta: He carries a diagnosis of cystic medial necrosis of the aorta; unable to closely verify this with any previous pathologic specimens.  He underwent proximal ascending aorta replacement in 1995 as outlined above (Vandana).  He has been recognized to have moderate residual dilatation of the distal ascending aorta; this measured 4.5 cm in 2018 and 4.8 cm in June 2023 by CTA.  I would recommend reassessment with CT at this time.  I would also recommend continued strict control of antihypertension (please see comments below).    Hypertension: He is currently treated with multiple antihypertensive medications including metoprolol succinate 100 mg daily, benazepril 10 mg daily, amlodipine 10 mg daily, terazosin 5 mg daily, and spironolactone 25 mg daily.  Notably normal potassium and creatinine in June 2024.  Blood pressure is well-controlled.  Recommend continue current medications.    Dyslipidemia: Modestly controlled lipids with LDL of 93 in 2024.  Currently treated with high intensity statin therapy with atorvastatin 80 mg daily and ezetimibe 10 mg daily.  Normal stress echocardiogram in 2022 (normal coronary arteries in 1995 on preoperative coronary angiogram).  Recommend continuing current statin therapy.    Ectopic heartbeats, cardiac conduction system disease: Previous EKGs have demonstrated PACs; EKG repeated today which demonstrated frequent PACs.  He is clinically asymptomatic with this and notably treated with a beta-blocker.  We will  obtain a twelve-lead EKG today.  He does have cardiac conduction system disease with first-degree AV block, left anterior fascicular block and right bundle branch block; no prior history of syncope/presyncope.  Continue to monitor for now.    To summarize he will undergo an echocardiogram (for reassessment of aortic prosthetic valve) as well as CT of the aorta (to assess distal ascending aorta dilatation); I will contact him with results.  Cardiology clinic follow-up in 1 year.    Thank you for the opportunity to participate in the care of this patient. Please do not hesitate to contact me with any questions or concerns.    CC: Jeff Pedro MD  [address]  [place] [address]    Today's clinic visit entailed:    40 minutes spent by me on the date of the encounter doing chart review, review of test results, interpretation of tests, patient visit, and documentation   Provider  Link to [facility] Help Grid     The level of medical decision making during this visit was of moderate complexity.  The longitudinal plan of care for the diagnosis(es)/condition(s) as documented were addressed during this visit. Due to the added complexity in care, I will continue to support Parish in the subsequent management and with ongoing continuity of care.     Giovanny Mercedes MD, FACC, FASE, FSCMR.      Thank you for allowing me to participate in the care of your patient.      Sincerely,     Giovanny Mercedes MD     [facility]  cc:   Jeff Pedro MD  [address]  [place] [id number]

## 2025-03-25 NOTE — PROGRESS NOTES
CARDIOLOGY CLINIC ESTABLISHED PATIENT NOTE    Patient name: Francisco Williamson  Age: 74 year old  Sex: male  YOB: 1950  Primary Care Physician: Biju Hernandez      CHIEF COMPLAINT: History of mechanical AVR/valve conduit and ascending aorta repair (1995).  Patient is new to me; previously followed with Dr. Smith.    HPI: Francisco Williamson is a 74 year old male with past medical history pertinent for cystic medial necrosis of the aorta, severe aortic regurgitation status post Bentall with 27 mm Saint Yordan mechanical valve conduit (1995), moderate distal ascending aorta dilatation (4.8 cm; last assessed with CTA in June 2023), chronic moderately elevated aortic valve mean gradient (typically in the high 20s although on occasion he is at higher gradients measured; most recently 29 mmHg in 2023) felt related to possible patient prosthesis mismatch (BANG demonstrating normal aortic prosthetic leaflets and 2012; normal heart valve fluoroscopy in 2017), hypertension, dyslipidemia, occasional PVCs, large hiatal hernia, prostate cancer status post radiation seeds and XRT, traumatic subdural hematoma in 2018 treated with partial right craniectomy/logan holes, peptic ulcer disease, and multiple other noncardiac comorbidities who presents for a cardiology follow-up visit.  The patient is new to me; previously followed with Dr. Smith.  Approximately 100 pages of previous cardiology records reviewed in the context of today's visit.    He carries a diagnosis of cystic medial necrosis of the aorta; I do not have access to any pathology specimen to confirm this.  In 1995 he was noted to have severe aortic regurgitation and aneurysmal dilatation of the proximal aorta; underwent Bentall with 27 mm Saint Yordan mechanical valve conduit.  Over the years he has been noted to have moderately elevated aortic prosthetic mean gradient; stable in the mid to high 20s range (there was one isolated elevated mean gradient of 47 mmHg in  2018); this been attributed to patient prosthesis mismatch although I would note that he has a fairly large sized aortic mechanical prosthetic valve (27 mm in size) which would make PPM unlikely.  Nonetheless BANG in 2012 demonstrated normal aortic prosthetic leaflet motion; heart valve fluoroscopy in 2013 and then again in 2017 also demonstrated normal prosthetic leaflet motion.  He has moderate dilatation of the distal ascending aorta (measures 4.8 cm distal to the proximal aortic graft); most recently assessed with CT in June 2023.  He also has a history of hypertension dyslipidemia.  His current medications include Coumadin (target INR between 2 and 3), metoprolol succinate 100 mg daily, amlodipine 10 mg daily, benazepril 10 mg daily, spironolactone 25 mg daily, terazosin 5 mg daily, atorvastatin 80 mg daily, and ezetimibe 10 mg daily.  Blood work from June 2024 reviewed; normal potassium 4.2 and creatinine of 1.08.  LDL was 93 in April 2024.    In terms of previous cardiac testing, in addition to the testing related to above, I would note that coronary angiography 9095 demonstrate normal coronary arteries.  Stress echocardiogram in 2022 was negative for myocardial ischemia.  Echocardiogram in 2023 demonstrated aortic prosthetic mean gradient of 29 mmHg and normal LVEF, and CT in June 2023 demonstrated the ascending aorta distal to his graft to measure 4.8 cm (CT in 2018 it demonstrated the distal ascending aorta to measure 4.5 cm).  When he last saw Dr. Smith in April 2024 there were working on improving blood pressure control and spironolactone was initiated.    He is doing well at this time and is asymptomatic from a cardiovascular perspective.  Blood pressure is well-controlled.  He denies chest pain, dyspnea, palpitation, presyncope and syncope.  He has unilateral vestibular syndrome with associated vertigo; used to play golf but no longer does so.  He does walk regularly; twice a week on a treadmill and  outside on other days.  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      PAST MEDICAL AND SURGICAL HISTORY:  I have reviewed this patient's past medical and surgical history    Past Medical History:   Diagnosis Date    Aortic valve disease     ascending aortic aneurysm , AVR 1995    CAD (coronary artery disease)     minimal on CT cor angiogram    Colitis     radiation colitis, 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    Cystic medial necrosis (H)     fibrillin test negative for marfan's    Embolic stroke (H)     L eye -when INR low, another CVA when given Vit K before prostate surgery    First degree heart block     and RBBB    Gastro-oesophageal reflux disease     Gastrointestinal bleeding, upper     EGD - GASTRIC ULCER, EROSIVE ESOPHAGITIS, NON BLEEDING ESOPHAGEAL ULCERS, NON BLEEDING GASTRIC ULCER, LILLIE REYNA TEAR    Hiatal hernia     Hyperlipidemia     Hypertension     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)    Intervertebral disk disease     cervicle and lumbar    Intervertebral disk disease     L4/5 disk herniation    Laceration     L wrist with complete severing of radial artery    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 Subdural hematoma was diagnosed on 4/20/2018 after acute neurologic decompensation, and patient was managed with right sided parietal craniectomy and frontal logan hole in [place].Transferred to [facility] 4/27 after he was stabilized. in [place]       Past Surgical History:   Procedure  Laterality Date    AORTIC VALVE REPLACEMENT  1995    SJM 27mm [id number] serial# [id number]: aortic valve replacement with composite graft with reimplanation of the coronary arteries into the graft    COLONOSCOPY      craineotomy  04/2018     closed head injury while vacationing in [place] 4/27/2018 and required emergent right subdural hematoma evacuation    H CATARACT SURGICAL PACKAGE Bilateral 2020    bilat    INSERT RADIATION SEEDS PROSTATE  3/15/2012    Procedure:INSERT RADIATION SEEDS PROSTATE; INSERT RADIATION SEEDS PROSTATE  - Paladium Seeds  110 seeds inserted; Surgeon:JONATHAN BAY; Location: [facility]    MOHS MICROGRAPHIC PROCEDURE      SCC face    ORTHOPEDIC SURGERY      LEFT ROTATOR CUFF REPAIR    REPAIR ESOPHAGEAL STRICTURE      ?clip in esophagus         FAMILY HISTORY:  I have reviewed this patient's family history.    Family History   Problem Relation Age of Onset    Heart Murmur Father 50        mitral valve disease-Rheumatic valve disease    Alzheimer Disease Mother 95    Bipolar Disorder Sister     Prostate Cancer Brother     Skin Cancer No family hx of          SOCIAL HISTORY:  I have reviewed this patient's social history.    Social History     Socioeconomic History    Marital status:    Occupational History    Occupation: retired     Comment: prior medical device exec   Tobacco Use    Smoking status: Never    Smokeless tobacco: Never   Substance and Sexual Activity    Alcohol use: Yes     Comment: couple beers per week    Drug use: No   Other Topics Concern    Special Diet No    Exercise No         CURRENT MEDICATIONS:  I have reviewed this patient's current medications.    Current Outpatient Medications   Medication Sig Dispense Refill    amLODIPine (NORVASC) 10 MG tablet Take 10 mg by mouth daily      atorvastatin (LIPITOR) 80 MG tablet Take 40 mg by mouth 2 times daily      benazepril (LOTENSIN) 10 MG tablet Take 1 tablet (10 mg) by mouth 2 times daily 180 tablet 4    cholecalciferol  "(VITAMIN D) 1000 UNIT tablet Take 2 tablets by mouth daily. 100 tablet prn    co-enzyme Q-10 100 MG CAPS Take 1 capsule by mouth daily. 90 capsule prn    ezetimibe (ZETIA) 10 MG tablet Take 1 tablet (10 mg) by mouth daily 90 tablet 3    IRON PO Take 65 mg by mouth 2 times daily      loratadine (CLARITIN) 10 MG tablet Take 10 mg by mouth daily      melatonin 3 MG tablet Take 3 mg by mouth nightly as needed for sleep (Patient not taking: Reported on 4/14/2022)      metoprolol succinate ER (TOPROL-XL) 100 MG 24 hr tablet Take 100 mg by mouth daily       omeprazole (PRILOSEC) 40 MG DR capsule Take 40 mg by mouth daily      senna-docusate (SENOKOT-S/PERICOLACE) 8.6-50 MG tablet Take 1 tablet by mouth 2 times daily as needed for constipation      spironolactone (ALDACTONE) 25 MG tablet take 1 tablet once a day 90 tablet 3    terazosin (HYTRIN) 5 MG capsule Take 5 mg by mouth At Bedtime      Warfarin Sodium (COUMADIN PO) Take 7 mg by mouth daily            ALLERGIES/SENSITIVITIES:  I have reviewed this patient's allergies.     Allergies   Allergen Reactions    Morphine Sulfate          PHYSICAL EXAM: /86   Pulse 69   Ht 1.803 m (5' 11\")   Wt 83 kg (182 lb 14.4 oz)   SpO2 100%   BMI 25.51 kg/m        GENERAL APPEARANCE: No acute distress, awake, alert.    HEENT: No scleral icterus or conjuctival hemorrhage, mucous membranes are moist.    NECK: No carotid bruits are present. Normal jugular venous pressure.    CVS: Regular rate and rhythm with normal S1 and normal mechanical S2, no S3 or S4 gallop is present.  Ectopic heartbeats noted.    LUNG: Clear to auscultation without crackles or wheezes. Respiratory effort is normal.    GASTROINTESTINAL: Abdomen is soft and non-tender with normal bowel sounds.    EXTREMITIES: No clubbing or cyanosis. No edema.    PSYCHIATRIC: Normal affect.    NEUROLOGIC: Alert and appropriate. No obvious focal deficits.      LABS AND DIAGNOSTICS:    CBC:  Lab Results   Component Value Date " "   WBC 4.8 11/16/2017    RBC 4.43 11/16/2017    HGB 13.6 11/16/2017    HCT 42.6 11/16/2017    MCV 96.3 11/16/2017    MCH 30.6 11/16/2017    MCHC 31.8 11/16/2017    RDW 15.8 11/16/2017     12/04/2021     11/16/2017       BMP:  Lab Results   Component Value Date     06/05/2024     06/29/2021    POTASSIUM 4.2 06/05/2024    POTASSIUM 4.5 06/01/2022    POTASSIUM 4.2 06/29/2021    CHLORIDE 98 06/05/2024    CHLORIDE 99 04/26/2024    CHLORIDE 102 06/29/2021    CO2 24 06/05/2024    CO2 28 06/01/2022    CO2 30 06/29/2021    ANIONGAP 13 06/05/2024    ANIONGAP 6 06/01/2022    ANIONGAP 3 06/29/2021     (H) 06/05/2024    GLC 88 06/01/2022    GLC 90 06/29/2021    BUN 15.4 06/05/2024    BUN 17 06/01/2022    BUN 17 06/29/2021    CR 1.08 06/05/2024    CR 1.16 06/29/2021    GFRESTIMATED 72 06/05/2024    GFRESTIMATED >60 06/23/2023    GFRESTIMATED 62 06/29/2021    GFRESTBLACK 72 06/29/2021    SACHA 8.8 06/05/2024    SACHA 8.7 06/29/2021       LIPIDS:  Lab Results   Component Value Date    CHOL 138 08/25/2023    CHOL 171 03/03/2021    HDL 54 08/25/2023    HDL 64 03/03/2021    LDL 64 08/25/2023    LDL 89 03/03/2021    TRIG 164 (A) 04/26/2024    TRIG 97 03/03/2021    CHOLHDLRATIO 2.6 08/09/2012       LFT:  Lab Results   Component Value Date    PROTTOTAL 6.9 03/03/2021    ALBUMIN 4.2 03/03/2021    BILITOTAL 0.5 03/03/2021    ALKPHOS 66 03/03/2021    AST 23 03/03/2021    ALT 19 03/03/2021       TFT:  Lab Results   Component Value Date    TSH 2.240 01/13/2020       HgbA1c:   No results found for: \"A1C\"    INR RESULTS:  Lab Results   Component Value Date    INR 1.49 (H) 12/04/2021    INR 2.31 (H) 08/08/2015    INR 2.59 (H) 06/23/2012       ECG (personally reviewed):  3/25/2025: Normal sinus rhythm, PACs, left anterior fascicular block, right bundle branch block.  First-degree AV block.  10/17/2016: Normal sinus rhythm, left anterior fascicular block, right bundle branch block.    CT aorta 6/23/2023:  1. Changes of " aortic valve replacement and ascending aortic graft  repair. The graft is patent.  2. Dilatation of the native ascending thoracic aorta distal to the  aortic graft measuring up to 4.8 cm, unchanged.  3. Resolution of previously seen groundglass opacities in the left  upper lobe.  4. Stable 1.0 cm nodule in the left lower lobe, likely representing  scarring.  5. Large hiatal hernia.    CT aorta 5/9/2018:  Fusiform aneurysmal dilatation of the ascending thoracic aorta distally to a maximum diameter of 4.5 cm.     Echocardiogram 4/6/2022:  The visual ejection fraction is 60-65%.  Diastolic Doppler findings (E/E' ratio and/or other parameters) suggest left  ventricular filling pressures are increased. Patient is hypertensive.  The right ventricle is normal in size and function.  The left atrium is severely dilated.  There is mild to moderate (1-2+) mitral regurgitation.  There is a 27 mm St Yordan aortic valve mechnical and a root composite graft by  history. The mean AoV pressure gradient is 29 mmHg and mild AI.  Valve acceleration times are borderline at 110 msec, but the MG seems to be  high even in echoes dating back to 2008. Findings likely suggestive of  moderate patient prosthesis mismatch.    Heart valve fluoroscopy 1/20/2017:  CONCLUSION: This bileaflet valve appears to be functioning normally  with full opening and likely full closing angles. Note that there is  still a question of tissue ingrowth. Repeat imaging of the closing  angle is important. The measurement above I used as 85 degrees is from  the horizontal, traditionally the reports are used from the complete  perpendiculars, so normal for this valve should be 15 degrees or less.  I have simply reported it as more than 85. Again the closing angle, I  cannot be completely sure of but the two leaflets are symmetric as  they close.  What is traditionally used for the closing is 120 degrees  from one leaflet across the other leaflet with the central line  being  the 0 point.    BANG 6/23/2012:  Interpretation Summary   The left ventricle is normal in size. Left ventricular systolic function is   normal. The visual ejection fraction is estimated at 55%. There is mild   biatrial enlargement. Intact atrial septum There is mild (1+) mitral   regurgitation. There is a bi-leaflet (St. Yordan) aortic mechanical prosthesis.   Valvular Strands attached to the ventricular aspect of the prosthetic aortic   valve No valvular vegetations or thrombus Graft visualized in the ascending   aorta There is no pericardial effusion. The rhythm was normal sinus. No   valvular vegetations or thrombi identified     Echocardiogram 3/18/2012:  Interpretation Summary   Normal left ventricular size and systolic function (LVEF 55-60%). There are   no regional wall motion abnormalities. Normal right ventricular size and   systolic function. Negative bubble study.  There is a mechanical aortic   valve. The valve appears well seated, but the leaflets are not well   visualized. The transaortic gradients (peak gradient of 43 mm Hg with mean   gradient of 23 mm Hg), are essentially unchanged compared to the prior study   on 08/25/2008. There is trace aortic regurgitation.  Trace mitral and   tricuspid regurgitation are noted. In comparison to the previous study   obtained on 08/25/2008, the findings appear similar.  No intracardiac   thrombus is identifed on this echocardiogram- however, a BANG should be   performed for definitive evaluation if clinically indicated.     Stress Test 5/6/2022:  1. Nondiagnostic stress exercise echocardiogram, due to inability to achieve  target heart rate (patient on beta-blocker).  2. Patient exercised for 5:30 minutes on the Jorge protocol achieving a  maximal heart rate of 104 bpm (71% of maximal predicted).  3. Normal blood pressure response to exercise.  4. Below average exercise capacity (7.0 METS).  5. Frequent premature ventricular complexes at rest and recovery,  improved  with exercise. No sustained arrhythmias.  Rest of findings per recent complete transthoracic echocardiogram dated  4/6/2022.      Cardiac catheterization 10/4/1995:  Normal coronary arteries.  Severe aortic regurgitation.      IMPRESSION AND PLAN: Francisco Williamson is a 74 year old male with past medical history pertinent for cystic medial necrosis of the aorta, severe aortic regurgitation status post Bentall with 27 mm Saint Yordan mechanical valve conduit (1995), moderate distal ascending aorta dilatation (4.8 cm; last assessed with CTA in June 2023), chronic moderately elevated aortic valve mean gradient (typically in the high 20s although on occasion he is at higher gradients measured; most recently 29 mmHg in 2023) felt related to possible patient prosthesis mismatch (BANG demonstrating normal aortic prosthetic leaflets and 2012; normal heart valve fluoroscopy in 2017), hypertension, dyslipidemia, occasional PVCs, large hiatal hernia, prostate cancer status post radiation seeds and XRT, traumatic subdural hematoma in 2018 treated with partial right craniectomy/logan holes, peptic ulcer disease, and multiple other noncardiac comorbidities who presents for a cardiology follow-up visit.  The patient is new to me; previously followed with Dr. Smith.  Approximately 100 pages of previous cardiology records reviewed in the context of today's visit.    Status post AVR (27 mm Saint Yordan mechanical valve conduit 1995): He has been noted to have moderately elevated aortic prosthetic mean gradients over the years; stable in the high 20s range; normal valve fluoroscopy in 2017 and normal BANG in 2012.  The elevated gradients have been attributed to patient prosthesis mismatch; this would be somewhat unusual given the large valve size.  Nonetheless, it is reassuring that prior evaluation for moderately elevated gradients including BANG and heart valve fluoroscopy has been unremarkable.  I would recommend repeat  echocardiogram at this time.  I will relay results to them.  Continue Coumadin with target INR between 2 and 3.    Ascending aorta aneurysm, cystic medial necrosis of the aorta: He carries a diagnosis of cystic medial necrosis of the aorta; unable to closely verify this with any previous pathologic specimens.  He underwent proximal ascending aorta replacement in 1995 as outlined above (Vandana).  He has been recognized to have moderate residual dilatation of the distal ascending aorta; this measured 4.5 cm in 2018 and 4.8 cm in June 2023 by CTA.  I would recommend reassessment with CT at this time.  I would also recommend continued strict control of antihypertension (please see comments below).    Hypertension: He is currently treated with multiple antihypertensive medications including metoprolol succinate 100 mg daily, benazepril 10 mg daily, amlodipine 10 mg daily, terazosin 5 mg daily, and spironolactone 25 mg daily.  Notably normal potassium and creatinine in June 2024.  Blood pressure is well-controlled.  Recommend continue current medications.    Dyslipidemia: Modestly controlled lipids with LDL of 93 in 2024.  Currently treated with high intensity statin therapy with atorvastatin 80 mg daily and ezetimibe 10 mg daily.  Normal stress echocardiogram in 2022 (normal coronary arteries in 1995 on preoperative coronary angiogram).  Recommend continuing current statin therapy.    Ectopic heartbeats, cardiac conduction system disease: Previous EKGs have demonstrated PACs; EKG repeated today which demonstrated frequent PACs.  He is clinically asymptomatic with this and notably treated with a beta-blocker.  We will obtain a twelve-lead EKG today.  He does have cardiac conduction system disease with first-degree AV block, left anterior fascicular block and right bundle branch block; no prior history of syncope/presyncope.  Continue to monitor for now.    To summarize he will undergo an echocardiogram (for reassessment  of aortic prosthetic valve) as well as CT of the aorta (to assess distal ascending aorta dilatation); I will contact him with results.  Cardiology clinic follow-up in 1 year.    Thank you for the opportunity to participate in the care of this patient. Please do not hesitate to contact me with any questions or concerns.    CC: Jeff Pedro MD  [address]  [place] [address]    Today's clinic visit entailed:    40 minutes spent by me on the date of the encounter doing chart review, review of test results, interpretation of tests, patient visit, and documentation   Provider  Link to [facility] Help Grid     The level of medical decision making during this visit was of moderate complexity.  The longitudinal plan of care for the diagnosis(es)/condition(s) as documented were addressed during this visit. Due to the added complexity in care, I will continue to support Parish in the subsequent management and with ongoing continuity of care.     Giovanny Mercedes MD, FACC, FASE, [facility]

## 2025-04-01 ENCOUNTER — HOSPITAL ENCOUNTER (OUTPATIENT)
Dept: CARDIOLOGY | Facility: CLINIC | Age: 75
Discharge: HOME | End: 2025-04-01
Attending: INTERNAL MEDICINE
Payer: COMMERCIAL

## 2025-04-01 DIAGNOSIS — Z95.2 S/P AVR: ICD-10-CM

## 2025-04-01 LAB — LVEF ECHO: NORMAL

## 2025-04-01 PROCEDURE — 93306 TTE W/DOPPLER COMPLETE: CPT | Mod: 26 | Performed by: INTERNAL MEDICINE

## 2025-04-01 PROCEDURE — 93306 TTE W/DOPPLER COMPLETE: CPT

## 2025-04-28 ENCOUNTER — HOSPITAL ENCOUNTER (OUTPATIENT)
Dept: CARDIOLOGY | Facility: CLINIC | Age: 75
Discharge: HOME | End: 2025-04-28
Attending: INTERNAL MEDICINE | Admitting: INTERNAL MEDICINE
Payer: COMMERCIAL

## 2025-04-28 VITALS — DIASTOLIC BLOOD PRESSURE: 82 MMHG | SYSTOLIC BLOOD PRESSURE: 111 MMHG | HEART RATE: 68 BPM

## 2025-04-28 DIAGNOSIS — I71.21 ANEURYSM OF ASCENDING AORTA WITHOUT RUPTURE: ICD-10-CM

## 2025-04-28 LAB
CREAT BLD-MCNC: 1.3 MG/DL (ref 0.7–1.2)
CREAT BLD-MCNC: 1.4 MG/DL (ref 0.7–1.2)
EGFRCR SERPLBLD CKD-EPI 2021: 52 ML/MIN/1.73M2
EGFRCR SERPLBLD CKD-EPI 2021: 57 ML/MIN/1.73M2

## 2025-04-28 PROCEDURE — 250N000011 HC RX IP 250 OP 636: Performed by: INTERNAL MEDICINE

## 2025-04-28 PROCEDURE — 82565 ASSAY OF CREATININE: CPT

## 2025-04-28 PROCEDURE — 71275 CT ANGIOGRAPHY CHEST: CPT

## 2025-04-28 PROCEDURE — 71275 CT ANGIOGRAPHY CHEST: CPT | Mod: 26 | Performed by: INTERNAL MEDICINE

## 2025-04-28 RX ORDER — NITROGLYCERIN 0.4 MG/1
0.4 TABLET SUBLINGUAL
Status: DISCONTINUED | OUTPATIENT
Start: 2025-04-28 | End: 2025-04-29 | Stop reason: HOSPADM

## 2025-04-28 RX ORDER — LIDOCAINE 40 MG/G
CREAM TOPICAL
Status: DISCONTINUED | OUTPATIENT
Start: 2025-04-28 | End: 2025-04-29 | Stop reason: HOSPADM

## 2025-04-28 RX ORDER — ONDANSETRON 2 MG/ML
4 INJECTION INTRAMUSCULAR; INTRAVENOUS
Status: DISCONTINUED | OUTPATIENT
Start: 2025-04-28 | End: 2025-04-29 | Stop reason: HOSPADM

## 2025-04-28 RX ORDER — IOPAMIDOL 755 MG/ML
100 INJECTION, SOLUTION INTRAVASCULAR ONCE
Status: COMPLETED | OUTPATIENT
Start: 2025-04-28 | End: 2025-04-28

## 2025-04-28 RX ADMIN — IOPAMIDOL 100 ML: 755 INJECTION, SOLUTION INTRAVENOUS at 14:56

## 2025-04-28 NOTE — PROGRESS NOTES
"Pt with increased I-stat creat. Pt reports \"I didn't drink any water today--I drink coffee all day, everyday\". Writer explained reasoning behind 6-8 glasses if water;pt verbalized understanding.,   "

## 2025-04-29 ENCOUNTER — TELEPHONE (OUTPATIENT)
Dept: CARDIOLOGY | Facility: CLINIC | Age: 75
End: 2025-04-29
Payer: COMMERCIAL

## 2025-04-29 NOTE — TELEPHONE ENCOUNTER
CTA chest completed following OV:  IMPRESSION:  Surgical changes consistent with prior mechanical aortic valve replacement and ascending aortic graft repair.(status post Bentall with 27 mm Saint Yordan mechanical valve conduit (1995)   Widely patent ascending aorta graft without periaortic thickening or fluid.    Distal native ascending aorta measures 4.6 cm.  Prior CTA from June reconstituted within the distal ascending aorta size of 4.8 cm.  On direct comparison, there is no significant change.    Plan for annual follow-up  Please advise if any changes to plan    Domi Nguyen RN on 4/29/2025 at 10:30 AM

## 2025-04-30 NOTE — TELEPHONE ENCOUNTER
Giovanny Mercedes MD, MD  You; [facility] 314 hours ago (4:39 PM)       Stable aortic dimension on CTA.  Please inform patient.     Message sent via result note  Domi Nguyen RN on 4/30/2025 at 7:37 AM

## 2025-05-04 ENCOUNTER — HEALTH MAINTENANCE LETTER (OUTPATIENT)
Age: 75
End: 2025-05-04

## 2025-05-06 ENCOUNTER — TELEPHONE (OUTPATIENT)
Dept: CARDIOLOGY | Facility: CLINIC | Age: 75
End: 2025-05-06
Payer: COMMERCIAL

## 2025-05-06 NOTE — TELEPHONE ENCOUNTER
"Writer placed call to Francisco about a request from the PCP office, [facility] Dr. Biju Hernandez that came in our fax inbox.    There was no description or information about what Dr. Hernandez wanted from Dr. Mercedes, other than \"Please review EKG\".    Writer called and spoke with the MADenise.  She said that she did not receive any instructions\information, except to \"fax to Dr. Mercedes for him to review.\"    Patient answered phone, but he is currently hospitalized at [facility] in [place].  He went in yesterday with hematuria.  His INR was slightly elevated at 3.4.   He is unsure when he will be released.    He states he is currently wearing a \"strip across my chest that is connected to a box\" and that Dr. Hernandez prescribed this for a week, and then patient will be returning this on Friday 5\9.    Writer is trying to clarify.  Hospital documentation has nothing about this.   Of note, he is on Telemetry unit and they are checking\documenting his rhythm strips.    Writer will follow up next week to see if Francisco was able to complete.    Writer did not hear back from the MA at [facility] to find out if they planned to re-order the heart rhythm monitor, or if they want us to do that.    Cathy Bernabe RN             "

## 2025-05-29 DIAGNOSIS — I10 BENIGN ESSENTIAL HYPERTENSION: ICD-10-CM

## 2025-05-29 RX ORDER — SPIRONOLACTONE 25 MG/1
TABLET ORAL
Qty: 90 TABLET | Refills: 3 | Status: SHIPPED | OUTPATIENT
Start: 2025-05-29

## 2025-05-29 NOTE — TELEPHONE ENCOUNTER
Concerns with IV integrity, starting new IV   [facility] Refill Guideline reviewed.  Medication meets criteria for refill.

## 2025-06-24 DIAGNOSIS — E78.5 HYPERLIPIDEMIA LDL GOAL <70: ICD-10-CM

## 2025-06-24 DIAGNOSIS — I25.10 CORONARY ARTERY DISEASE INVOLVING NATIVE CORONARY ARTERY OF NATIVE HEART WITHOUT ANGINA PECTORIS: ICD-10-CM

## 2025-06-24 DIAGNOSIS — I10 BENIGN ESSENTIAL HYPERTENSION: ICD-10-CM

## 2025-06-24 RX ORDER — EZETIMIBE 10 MG/1
10 TABLET ORAL DAILY
Qty: 90 TABLET | Refills: 3 | Status: SHIPPED | OUTPATIENT
Start: 2025-06-24

## 2025-07-17 DIAGNOSIS — I10 BENIGN ESSENTIAL HYPERTENSION: ICD-10-CM

## 2025-07-17 RX ORDER — BENAZEPRIL HYDROCHLORIDE 10 MG/1
10 TABLET ORAL 2 TIMES DAILY
Qty: 180 TABLET | Refills: 3 | Status: SHIPPED | OUTPATIENT
Start: 2025-07-17

## 2025-08-17 ENCOUNTER — HEALTH MAINTENANCE LETTER (OUTPATIENT)
Age: 75
End: 2025-08-17